# Patient Record
Sex: MALE | Race: ASIAN | NOT HISPANIC OR LATINO | Employment: PART TIME | URBAN - METROPOLITAN AREA
[De-identification: names, ages, dates, MRNs, and addresses within clinical notes are randomized per-mention and may not be internally consistent; named-entity substitution may affect disease eponyms.]

---

## 2022-04-14 ENCOUNTER — LAB (OUTPATIENT)
Dept: LAB | Facility: CLINIC | Age: 42
End: 2022-04-14
Payer: MEDICAID

## 2022-04-14 DIAGNOSIS — Z02.89 REFUGEE HEALTH EXAMINATION: Primary | ICD-10-CM

## 2022-04-14 LAB
ALBUMIN SERPL-MCNC: 3.8 G/DL (ref 3.5–5)
ALP SERPL-CCNC: 78 U/L (ref 45–120)
ALT SERPL W P-5'-P-CCNC: 12 U/L (ref 0–45)
ANION GAP SERPL CALCULATED.3IONS-SCNC: 12 MMOL/L (ref 5–18)
AST SERPL W P-5'-P-CCNC: 15 U/L (ref 0–40)
BASOPHILS # BLD AUTO: 0.1 10E3/UL (ref 0–0.2)
BASOPHILS NFR BLD AUTO: 1 %
BILIRUB SERPL-MCNC: 0.6 MG/DL (ref 0–1)
BUN SERPL-MCNC: 9 MG/DL (ref 8–22)
CALCIUM SERPL-MCNC: 9.4 MG/DL (ref 8.5–10.5)
CHLORIDE BLD-SCNC: 101 MMOL/L (ref 98–107)
CHOLEST SERPL-MCNC: 252 MG/DL
CO2 SERPL-SCNC: 23 MMOL/L (ref 22–31)
CREAT SERPL-MCNC: 1.16 MG/DL (ref 0.7–1.3)
EOSINOPHIL # BLD AUTO: 0.1 10E3/UL (ref 0–0.7)
EOSINOPHIL NFR BLD AUTO: 2 %
ERYTHROCYTE [DISTWIDTH] IN BLOOD BY AUTOMATED COUNT: 12.8 % (ref 10–15)
FASTING STATUS PATIENT QL REPORTED: YES
GFR SERPL CREATININE-BSD FRML MDRD: 80 ML/MIN/1.73M2
GLUCOSE BLD-MCNC: 373 MG/DL (ref 70–125)
HCT VFR BLD AUTO: 51.1 % (ref 40–53)
HDLC SERPL-MCNC: 31 MG/DL
HGB BLD-MCNC: 17.4 G/DL (ref 13.3–17.7)
HIV 1+2 AB+HIV1 P24 AG SERPL QL IA: NEGATIVE
IMM GRANULOCYTES # BLD: 0 10E3/UL
IMM GRANULOCYTES NFR BLD: 0 %
LDLC SERPL CALC-MCNC: ABNORMAL MG/DL
LYMPHOCYTES # BLD AUTO: 2 10E3/UL (ref 0.8–5.3)
LYMPHOCYTES NFR BLD AUTO: 25 %
MCH RBC QN AUTO: 28.8 PG (ref 26.5–33)
MCHC RBC AUTO-ENTMCNC: 34.1 G/DL (ref 31.5–36.5)
MCV RBC AUTO: 85 FL (ref 78–100)
MONOCYTES # BLD AUTO: 0.5 10E3/UL (ref 0–1.3)
MONOCYTES NFR BLD AUTO: 6 %
NEUTROPHILS # BLD AUTO: 5.3 10E3/UL (ref 1.6–8.3)
NEUTROPHILS NFR BLD AUTO: 66 %
NRBC # BLD AUTO: 0 10E3/UL
NRBC BLD AUTO-RTO: 0 /100
PLATELET # BLD AUTO: 255 10E3/UL (ref 150–450)
POTASSIUM BLD-SCNC: 3.9 MMOL/L (ref 3.5–5)
PROT SERPL-MCNC: 8.1 G/DL (ref 6–8)
RBC # BLD AUTO: 6.04 10E6/UL (ref 4.4–5.9)
SODIUM SERPL-SCNC: 136 MMOL/L (ref 136–145)
TRIGL SERPL-MCNC: 542 MG/DL
VZV IGG SER QL IA: 750.1 INDEX
VZV IGG SER QL IA: POSITIVE
WBC # BLD AUTO: 8 10E3/UL (ref 4–11)

## 2022-04-14 PROCEDURE — 99000 SPECIMEN HANDLING OFFICE-LAB: CPT | Performed by: FAMILY MEDICINE

## 2022-04-14 PROCEDURE — 83721 ASSAY OF BLOOD LIPOPROTEIN: CPT | Mod: 59 | Performed by: FAMILY MEDICINE

## 2022-04-14 PROCEDURE — 86787 VARICELLA-ZOSTER ANTIBODY: CPT | Performed by: FAMILY MEDICINE

## 2022-04-14 PROCEDURE — 36415 COLL VENOUS BLD VENIPUNCTURE: CPT | Performed by: FAMILY MEDICINE

## 2022-04-14 PROCEDURE — 86682 HELMINTH ANTIBODY: CPT | Mod: 90 | Performed by: FAMILY MEDICINE

## 2022-04-14 PROCEDURE — 86706 HEP B SURFACE ANTIBODY: CPT | Performed by: FAMILY MEDICINE

## 2022-04-14 PROCEDURE — 86481 TB AG RESPONSE T-CELL SUSP: CPT | Performed by: FAMILY MEDICINE

## 2022-04-14 PROCEDURE — 80061 LIPID PANEL: CPT | Performed by: FAMILY MEDICINE

## 2022-04-14 PROCEDURE — 86704 HEP B CORE ANTIBODY TOTAL: CPT | Performed by: FAMILY MEDICINE

## 2022-04-14 PROCEDURE — 87389 HIV-1 AG W/HIV-1&-2 AB AG IA: CPT | Performed by: FAMILY MEDICINE

## 2022-04-14 PROCEDURE — 80053 COMPREHEN METABOLIC PANEL: CPT | Performed by: FAMILY MEDICINE

## 2022-04-14 PROCEDURE — 86708 HEPATITIS A ANTIBODY: CPT | Performed by: FAMILY MEDICINE

## 2022-04-14 PROCEDURE — 85025 COMPLETE CBC W/AUTO DIFF WBC: CPT | Performed by: FAMILY MEDICINE

## 2022-04-14 PROCEDURE — 86803 HEPATITIS C AB TEST: CPT | Performed by: FAMILY MEDICINE

## 2022-04-14 PROCEDURE — 87340 HEPATITIS B SURFACE AG IA: CPT | Performed by: FAMILY MEDICINE

## 2022-04-15 LAB
GAMMA INTERFERON BACKGROUND BLD IA-ACNC: 0.43 IU/ML
HAV IGG SER QL IA: POSITIVE
HBV CORE AB SERPL QL IA: POSITIVE
HBV SURFACE AB SERPLBLD QL IA.RAPID: POSITIVE
HBV SURFACE AG SERPL QL IA: NONREACTIVE
HCV AB SERPL QL IA: NEGATIVE
LDLC SERPL CALC-MCNC: 122 MG/DL
M TB IFN-G BLD-IMP: NEGATIVE
M TB IFN-G CD4+ BCKGRND COR BLD-ACNC: 9.57 IU/ML
MITOGEN IGNF BCKGRD COR BLD-ACNC: -0.42 IU/ML
MITOGEN IGNF BCKGRD COR BLD-ACNC: 0.25 IU/ML
QUANTIFERON MITOGEN: 10 IU/ML
QUANTIFERON NIL TUBE: 0.43 IU/ML
QUANTIFERON TB1 TUBE: 0.68 IU/ML
QUANTIFERON TB2 TUBE: 0.01

## 2022-04-16 LAB
SCHISTOSOMA IGG SER IA-ACNC: 7 U
STRONGYLOIDES IGG SER IA-ACNC: 0.5 IV

## 2022-04-20 ENCOUNTER — OFFICE VISIT (OUTPATIENT)
Dept: FAMILY MEDICINE | Facility: CLINIC | Age: 42
End: 2022-04-20
Payer: MEDICAID

## 2022-04-20 VITALS
HEART RATE: 96 BPM | WEIGHT: 212.5 LBS | SYSTOLIC BLOOD PRESSURE: 118 MMHG | TEMPERATURE: 98.4 F | DIASTOLIC BLOOD PRESSURE: 68 MMHG | BODY MASS INDEX: 31.47 KG/M2 | HEIGHT: 69 IN | RESPIRATION RATE: 18 BRPM | OXYGEN SATURATION: 96 %

## 2022-04-20 DIAGNOSIS — F43.10 PTSD (POST-TRAUMATIC STRESS DISORDER): ICD-10-CM

## 2022-04-20 DIAGNOSIS — E11.69 TYPE 2 DIABETES MELLITUS WITH OTHER SPECIFIED COMPLICATION, WITHOUT LONG-TERM CURRENT USE OF INSULIN (H): ICD-10-CM

## 2022-04-20 DIAGNOSIS — G47.33 OSA (OBSTRUCTIVE SLEEP APNEA): ICD-10-CM

## 2022-04-20 DIAGNOSIS — Z00.00 PREVENTATIVE HEALTH CARE: Primary | ICD-10-CM

## 2022-04-20 DIAGNOSIS — I51.7 CARDIOMEGALY: ICD-10-CM

## 2022-04-20 DIAGNOSIS — E66.01 CLASS 2 SEVERE OBESITY DUE TO EXCESS CALORIES WITH SERIOUS COMORBIDITY AND BODY MASS INDEX (BMI) OF 35.0 TO 35.9 IN ADULT (H): ICD-10-CM

## 2022-04-20 DIAGNOSIS — L91.8 SKIN TAG: ICD-10-CM

## 2022-04-20 DIAGNOSIS — L91.0 KELOID SCAR: ICD-10-CM

## 2022-04-20 DIAGNOSIS — M16.12 PRIMARY OSTEOARTHRITIS OF LEFT HIP: ICD-10-CM

## 2022-04-20 DIAGNOSIS — R73.09 ELEVATED GLUCOSE: ICD-10-CM

## 2022-04-20 DIAGNOSIS — E66.812 CLASS 2 SEVERE OBESITY DUE TO EXCESS CALORIES WITH SERIOUS COMORBIDITY AND BODY MASS INDEX (BMI) OF 35.0 TO 35.9 IN ADULT (H): ICD-10-CM

## 2022-04-20 DIAGNOSIS — Z02.89 REFUGEE HEALTH EXAMINATION: ICD-10-CM

## 2022-04-20 LAB
ALBUMIN UR-MCNC: NEGATIVE MG/DL
APPEARANCE UR: CLEAR
BILIRUB UR QL STRIP: NEGATIVE
COLOR UR AUTO: YELLOW
CREAT UR-MCNC: 179 MG/DL
GLUCOSE UR STRIP-MCNC: 500 MG/DL
HBA1C MFR BLD: 9.8 % (ref 0–5.6)
HGB UR QL STRIP: NEGATIVE
KETONES UR STRIP-MCNC: NEGATIVE MG/DL
LEUKOCYTE ESTERASE UR QL STRIP: NEGATIVE
MICROALBUMIN UR-MCNC: 1.68 MG/DL (ref 0–1.99)
MICROALBUMIN/CREAT UR: 9.4 MG/G CR
NITRATE UR QL: NEGATIVE
PH UR STRIP: 5.5 [PH] (ref 5–7)
SP GR UR STRIP: 1.02 (ref 1–1.03)
UROBILINOGEN UR STRIP-ACNC: 0.2 E.U./DL

## 2022-04-20 PROCEDURE — 93010 ELECTROCARDIOGRAM REPORT: CPT | Performed by: INTERNAL MEDICINE

## 2022-04-20 PROCEDURE — 93005 ELECTROCARDIOGRAM TRACING: CPT | Performed by: FAMILY MEDICINE

## 2022-04-20 PROCEDURE — 81003 URINALYSIS AUTO W/O SCOPE: CPT | Performed by: FAMILY MEDICINE

## 2022-04-20 PROCEDURE — 82306 VITAMIN D 25 HYDROXY: CPT | Performed by: FAMILY MEDICINE

## 2022-04-20 PROCEDURE — 90713 POLIOVIRUS IPV SC/IM: CPT | Performed by: FAMILY MEDICINE

## 2022-04-20 PROCEDURE — 90715 TDAP VACCINE 7 YRS/> IM: CPT | Performed by: FAMILY MEDICINE

## 2022-04-20 PROCEDURE — 99396 PREV VISIT EST AGE 40-64: CPT | Mod: 25 | Performed by: FAMILY MEDICINE

## 2022-04-20 PROCEDURE — 99214 OFFICE O/P EST MOD 30 MIN: CPT | Mod: 25 | Performed by: FAMILY MEDICINE

## 2022-04-20 PROCEDURE — 90472 IMMUNIZATION ADMIN EACH ADD: CPT | Performed by: FAMILY MEDICINE

## 2022-04-20 PROCEDURE — 90746 HEPB VACCINE 3 DOSE ADULT IM: CPT | Performed by: FAMILY MEDICINE

## 2022-04-20 PROCEDURE — 82043 UR ALBUMIN QUANTITATIVE: CPT | Performed by: FAMILY MEDICINE

## 2022-04-20 PROCEDURE — 90471 IMMUNIZATION ADMIN: CPT | Performed by: FAMILY MEDICINE

## 2022-04-20 PROCEDURE — 36415 COLL VENOUS BLD VENIPUNCTURE: CPT | Performed by: FAMILY MEDICINE

## 2022-04-20 PROCEDURE — 83036 HEMOGLOBIN GLYCOSYLATED A1C: CPT | Performed by: FAMILY MEDICINE

## 2022-04-20 RX ORDER — LISINOPRIL 2.5 MG/1
2.5 TABLET ORAL DAILY
Qty: 30 TABLET | Refills: 11 | Status: SHIPPED | OUTPATIENT
Start: 2022-04-20 | End: 2022-04-21 | Stop reason: ALTCHOICE

## 2022-04-20 RX ORDER — ATORVASTATIN CALCIUM 20 MG/1
20 TABLET, FILM COATED ORAL DAILY
Qty: 30 TABLET | Refills: 11 | Status: SHIPPED | OUTPATIENT
Start: 2022-04-20 | End: 2022-04-21 | Stop reason: ALTCHOICE

## 2022-04-20 RX ORDER — IBUPROFEN 600 MG/1
600 TABLET, FILM COATED ORAL EVERY 6 HOURS PRN
Qty: 50 TABLET | Refills: 4 | Status: SHIPPED | OUTPATIENT
Start: 2022-04-20 | End: 2023-05-19

## 2022-04-20 RX ORDER — ACETAMINOPHEN 500 MG
500-1000 TABLET ORAL EVERY 8 HOURS PRN
Qty: 50 TABLET | Refills: 4 | Status: ON HOLD | OUTPATIENT
Start: 2022-04-20 | End: 2023-06-19

## 2022-04-20 RX ORDER — METFORMIN HCL 500 MG
2000 TABLET, EXTENDED RELEASE 24 HR ORAL
Qty: 120 TABLET | Refills: 11 | Status: SHIPPED | OUTPATIENT
Start: 2022-04-20 | End: 2022-04-21

## 2022-04-21 LAB
ATRIAL RATE - MUSE: 100 BPM
DEPRECATED CALCIDIOL+CALCIFEROL SERPL-MC: 28 UG/L (ref 20–75)
DIASTOLIC BLOOD PRESSURE - MUSE: NORMAL MMHG
INTERPRETATION ECG - MUSE: NORMAL
P AXIS - MUSE: 42 DEGREES
PR INTERVAL - MUSE: 156 MS
QRS DURATION - MUSE: 82 MS
QT - MUSE: 360 MS
QTC - MUSE: 464 MS
R AXIS - MUSE: 16 DEGREES
SYSTOLIC BLOOD PRESSURE - MUSE: NORMAL MMHG
T AXIS - MUSE: 36 DEGREES
VENTRICULAR RATE- MUSE: 100 BPM

## 2022-04-21 RX ORDER — ATORVASTATIN CALCIUM 10 MG/1
10 TABLET, FILM COATED ORAL DAILY
Qty: 30 TABLET | Refills: 11 | Status: SHIPPED | OUTPATIENT
Start: 2022-04-21 | End: 2022-12-12

## 2022-04-21 RX ORDER — LISINOPRIL 2.5 MG/1
2.5 TABLET ORAL DAILY
Qty: 30 TABLET | Refills: 11 | Status: SHIPPED | OUTPATIENT
Start: 2022-04-21 | End: 2024-03-05

## 2022-04-21 RX ORDER — METFORMIN HCL 500 MG
2000 TABLET, EXTENDED RELEASE 24 HR ORAL
Qty: 120 TABLET | Refills: 11 | Status: SHIPPED | OUTPATIENT
Start: 2022-04-21 | End: 2022-08-17
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
## 2022-04-29 ENCOUNTER — TRANSFERRED RECORDS (OUTPATIENT)
Dept: HEALTH INFORMATION MANAGEMENT | Facility: CLINIC | Age: 42
End: 2022-04-29
Payer: MEDICAID

## 2022-05-09 ENCOUNTER — TELEPHONE (OUTPATIENT)
Dept: FAMILY MEDICINE | Facility: CLINIC | Age: 42
End: 2022-05-09
Payer: MEDICAID

## 2022-05-09 NOTE — TELEPHONE ENCOUNTER
Reason for Call:  Other Transportation     Detailed comments: Patient's  called to request transportation for patient's upcoming appts. Please help assist in scheduling transportation for upcoming appts.     Phone Number Patient can be reached at: Home number on file [phone] (home)    Best Time: any    Can we leave a detailed message on this number? YES    Call taken on 5/9/2022 at 11:13 AM by Elfego Farfan

## 2022-05-20 ENCOUNTER — TRANSFERRED RECORDS (OUTPATIENT)
Dept: HEALTH INFORMATION MANAGEMENT | Facility: CLINIC | Age: 42
End: 2022-05-20

## 2022-06-07 ENCOUNTER — TRANSFERRED RECORDS (OUTPATIENT)
Dept: HEALTH INFORMATION MANAGEMENT | Facility: CLINIC | Age: 42
End: 2022-06-07

## 2022-06-10 ENCOUNTER — TELEPHONE (OUTPATIENT)
Dept: ORTHOPEDICS | Facility: CLINIC | Age: 42
End: 2022-06-10
Payer: MEDICAID

## 2022-06-10 NOTE — TELEPHONE ENCOUNTER
Writer talked with the call center. Patient was on phone trying to get scheduled with Dr. Salmon for Left hip pain. Writer is unable to see any images at this time. Pt states that MRI was done at [facility]. The imaging coordinator in clinic will work on requesting imaging to be pushed. Once imaging is arrived writer will have Dr. Salmon's team review and see if pt can be added to providers schedule. Pt is being referred from [facility].     Jory Gonzalez LPN

## 2022-06-10 NOTE — TELEPHONE ENCOUNTER
Health Call Center    Phone Message    May a detailed message be left on voicemail: yes     Reason for Call: Appointment Intake    Referring Provider Name: Dr. Salmon  Diagnosis and/or Symptoms:  Left hip pain.  Pt being referred from [facility] - ( Dr. Ric Carrizales) referring provider     Pt had 3 MRI's done. Last MRI done at [facility].  Pt also had MRI's done at [facility]    Action Taken: Message routed to:  [facility] Orthopedics - Dr. Salmon    Travel Screening: Not Applicable

## 2022-06-14 ENCOUNTER — TELEPHONE (OUTPATIENT)
Dept: FAMILY MEDICINE | Facility: CLINIC | Age: 42
End: 2022-06-14

## 2022-06-14 NOTE — TELEPHONE ENCOUNTER
Patient missed his appointment today.  Because he is a new refugee and he has diabetes, we know he really needs to be seen regularly and that he might have trouble remembering appointments.    Is he planning to follow up? At [facility] or another clinic? What time of day is best for appointments?    His wife, too, needs follow up.    Please help him to schedule (and his wife) if he agrees with anyone at any clinic he chooses.

## 2022-06-17 ENCOUNTER — TELEPHONE (OUTPATIENT)
Dept: FAMILY MEDICINE | Facility: CLINIC | Age: 42
End: 2022-06-17
Payer: COMMERCIAL

## 2022-06-17 NOTE — TELEPHONE ENCOUNTER
PCP received a medical opinion form which is said to be due 6/22/22. However, patient's transportation did not show up, so he missed his recent appointment. He is rescehduled for 6/29/22. PCP cannot complete the form until after that appointment.

## 2022-06-29 ENCOUNTER — MEDICAL CORRESPONDENCE (OUTPATIENT)
Dept: HEALTH INFORMATION MANAGEMENT | Facility: CLINIC | Age: 42
End: 2022-06-29

## 2022-06-29 ENCOUNTER — OFFICE VISIT (OUTPATIENT)
Dept: FAMILY MEDICINE | Facility: CLINIC | Age: 42
End: 2022-06-29
Payer: COMMERCIAL

## 2022-06-29 VITALS
SYSTOLIC BLOOD PRESSURE: 112 MMHG | TEMPERATURE: 98 F | OXYGEN SATURATION: 96 % | WEIGHT: 209.5 LBS | DIASTOLIC BLOOD PRESSURE: 70 MMHG | BODY MASS INDEX: 31.03 KG/M2 | HEART RATE: 105 BPM | RESPIRATION RATE: 16 BRPM

## 2022-06-29 DIAGNOSIS — Z00.00 PREVENTATIVE HEALTH CARE: ICD-10-CM

## 2022-06-29 DIAGNOSIS — E11.69 TYPE 2 DIABETES MELLITUS WITH OTHER SPECIFIED COMPLICATION, WITHOUT LONG-TERM CURRENT USE OF INSULIN (H): ICD-10-CM

## 2022-06-29 DIAGNOSIS — M16.12 PRIMARY OSTEOARTHRITIS OF LEFT HIP: Primary | ICD-10-CM

## 2022-06-29 PROCEDURE — 99215 OFFICE O/P EST HI 40 MIN: CPT | Performed by: FAMILY MEDICINE

## 2022-06-29 PROCEDURE — 99417 PROLNG OP E/M EACH 15 MIN: CPT | Performed by: FAMILY MEDICINE

## 2022-06-29 NOTE — PROGRESS NOTES
Assessment & Plan     Primary osteoarthritis of left hip  I am unsure the problem is osteoarthritis - that can be determined by the orthopod. He likely had a hip infection or some major illness that centered in the left hip when he was 7. Then he injured the hip again at age 14. He was very disappointed the doctor at [facility] would not help him. He is very willing to go to the [facility] to see what they say. He is motivated to get improvement in his left and he'd like to do the surgery soon, and then start work.  I will complete his workability form, too.    Preventative health care  reviewed  - REVIEW OF HEALTH MAINTENANCE PROTOCOL ORDERS    Type 2 diabetes mellitus with other specified complication, without long-term current use of insulin (H)  He has had trouble tolerating the metformin. I asked him to go slowly in order to try to help his body get used to the metformin. If he cannot get beyond 500mg, then I'll add glipizide for better control. This really needs to be better controlled before he can work. I also spent some time discussing lifestyle means to control his sugars - having a variety of foods at meals, exercise, etc.He and his wife asked questions and were very attentive.  - OPTOMETRY REFERRAL      Review of external notes as documented elsewhere in note  Ordering of each unique test  Prescription drug management  75 minutes spent on the date of the encounter doing chart review, history and exam, documentation and further activities per the note    Return in about 5 weeks (around 8/3/2022) for Follow up.    Hanny Vela MD  [facility] SHANIQUE Shepherd is a 42 year old accompanied by his spouse., presenting for the following health issues:  Diabetes      History of Present Illness       Diabetes:   He presents for follow up of diabetes.  He is not checking blood glucose. He has no concerns regarding his diabetes at this time.  He is not experiencing numbness  or burning in feet, excessive thirst, blurry vision, weight changes or redness, sores or blisters on feet. The patient has not had a diabetic eye exam in the last 12 months.         He eats 2-3 servings of fruits and vegetables daily.He consumes 0 sweetened beverage(s) daily.He exercises with enough effort to increase his heart rate 9 or less minutes per day.  He exercises with enough effort to increase his heart rate 3 or less days per week.   He is taking medications regularly.     Needs form completed, saying if he can work or not.  Frustration with phone communication and need for transportation. 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 He has passed the written exam.    Need for ortho appt. He has tried to call to get an appointment, but something is not working because he has not yet been able to setup an appointment. He'd like help to set it up. When he was at [facility] he was mad that the doctor there said he could not do anything to help. Thet has waited his whole life to be in a place where he could get his hip repaired. He is extremely motivated to do whatever is needed so his hip can improve. He dreams of being able to run again.    He has terrible diarrhea from the metformin. He took it as instructed, but he's had to limit how much he goes out of the house because of needing to use the toilet so often. He is eager to learn more about lifestyle measures to control sugars. He hopes to avoid insulin.    Review of Systems   Lots of frustrations with being in dependent positions.      Objective    /70   Pulse 105   Temp 98  F (36.7  C) (Temporal)   Resp 16   Wt 95 kg (209 lb 8 oz)   SpO2 96%   BMI 31.03 kg/m    Body mass index is 31.03 kg/m .  Physical Exam   GENERAL: healthy, alert and no distress  NECK: no adenopathy, no asymmetry, masses, or scars and thyroid normal to palpation  RESP: lungs clear to auscultation - no rales, rhonchi or wheezes  CV: regular rate and  rhythm, normal S1 S2, no S3 or S4, no murmur, click or rub, no peripheral edema and peripheral pulses strong  PSYCH: mentation appears normal, affect normal/bright  Gait: uneven but steady    Reviewed past labs- it is too early to recheck A1-C            .  ..

## 2022-06-30 NOTE — TELEPHONE ENCOUNTER
DIAGNOSIS: Primary osteoarthritis of left hip/ ELOISE VELA   APPOINTMENT DATE: 7.8.22   NOTES STATUS DETAILS   OFFICE NOTE from referring provider Internal 06/29/2022, 04/20/2022 -  Dr Eloise Vela, [facility]     OFFICE NOTE from other specialist [facility] 05/20/2022, 04/29/2022 - Colette Smith MD - [facility]    04/29/2022 - Ric Carrizales PA-C - [facility]   MEDICATION LIST Internal/Care Everywhere    LABS     CBC/DIFF Internal 04/14/2022   INJECTIONS PACS 05/20/2022   MRI PACS 06/07/2022, 05/03/2022 - LT Hip  05/20/2022 - LT Hip Arthrogram   XRAYS (IMAGES & REPORTS) PACS 04/29/2022 - Bilateral Hip     Action 6.30.22 4:40 PM FRANKY   Action Taken Faxed request to [facility] [phone]      Action July 6, 2022 4:38 PM MT   Action Taken [facility] sent a req for imgs from [facility] [phone] .     Action July 7, 2022 7:58 PM MT   Action Taken [facility] records recvd and sent to scan by another user.   [facility] recvd and resolved imgs to PACS.

## 2022-07-08 ENCOUNTER — OFFICE VISIT (OUTPATIENT)
Dept: ORTHOPEDICS | Facility: CLINIC | Age: 42
End: 2022-07-08
Payer: COMMERCIAL

## 2022-07-08 ENCOUNTER — PRE VISIT (OUTPATIENT)
Dept: ORTHOPEDICS | Facility: CLINIC | Age: 42
End: 2022-07-08

## 2022-07-08 VITALS — BODY MASS INDEX: 30.51 KG/M2 | HEIGHT: 69 IN | WEIGHT: 206 LBS

## 2022-07-08 DIAGNOSIS — Z98.890 S/P GIRDLESTONE PROCEDURE: Primary | ICD-10-CM

## 2022-07-08 PROCEDURE — 99203 OFFICE O/P NEW LOW 30 MIN: CPT | Mod: GC | Performed by: ORTHOPAEDIC SURGERY
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
## 2022-07-15 ENCOUNTER — VIRTUAL VISIT (OUTPATIENT)
Dept: SLEEP MEDICINE | Facility: CLINIC | Age: 42
End: 2022-07-15
Attending: FAMILY MEDICINE
Payer: COMMERCIAL

## 2022-07-15 VITALS — HEIGHT: 70 IN | BODY MASS INDEX: 30.06 KG/M2 | WEIGHT: 210 LBS

## 2022-07-15 DIAGNOSIS — G47.30 OBSERVED SLEEP APNEA: ICD-10-CM

## 2022-07-15 DIAGNOSIS — R06.83 SNORING: Primary | ICD-10-CM

## 2022-07-15 DIAGNOSIS — F51.04 CHRONIC INSOMNIA: ICD-10-CM

## 2022-07-15 PROCEDURE — 99205 OFFICE O/P NEW HI 60 MIN: CPT | Mod: 95 | Performed by: PHYSICIAN ASSISTANT

## 2022-07-15 ASSESSMENT — SLEEP AND FATIGUE QUESTIONNAIRES
HOW LIKELY ARE YOU TO NOD OFF OR FALL ASLEEP WHILE SITTING INACTIVE IN A PUBLIC PLACE: SLIGHT CHANCE OF DOZING
HOW LIKELY ARE YOU TO NOD OFF OR FALL ASLEEP WHILE SITTING AND TALKING TO SOMEONE: MODERATE CHANCE OF DOZING
HOW LIKELY ARE YOU TO NOD OFF OR FALL ASLEEP WHILE SITTING AND READING: MODERATE CHANCE OF DOZING
HOW LIKELY ARE YOU TO NOD OFF OR FALL ASLEEP WHEN YOU ARE A PASSENGER IN A CAR FOR AN HOUR WITHOUT A BREAK: MODERATE CHANCE OF DOZING
HOW LIKELY ARE YOU TO NOD OFF OR FALL ASLEEP WHILE LYING DOWN TO REST IN THE AFTERNOON WHEN CIRCUMSTANCES PERMIT: MODERATE CHANCE OF DOZING
HOW LIKELY ARE YOU TO NOD OFF OR FALL ASLEEP IN A CAR, WHILE STOPPED FOR A FEW MINUTES IN TRAFFIC: WOULD NEVER DOZE
HOW LIKELY ARE YOU TO NOD OFF OR FALL ASLEEP WHILE WATCHING TV: MODERATE CHANCE OF DOZING
HOW LIKELY ARE YOU TO NOD OFF OR FALL ASLEEP WHILE SITTING QUIETLY AFTER LUNCH WITHOUT ALCOHOL: MODERATE CHANCE OF DOZING

## 2022-07-15 ASSESSMENT — PAIN SCALES - GENERAL: PAINLEVEL: NO PAIN (0)

## 2022-07-15 NOTE — PATIENT INSTRUCTIONS
"      MY TREATMENT INFORMATION FOR SLEEP APNEA-  Cora Senior    DOCTOR : Bennett Goltz, PA-KARLENE    Am I having a sleep study at a sleep center?  --->Due to normal delays, you will be contacted within 2-4 weeks to schedule    Am I having a home sleep study?  --->Watch the video for the device you are using:    -/drop off device-   https://www.[employer].com/watch?v=yGGFBdELGhk    -Disposable device sent out require phone/computer application-   https://www.[employer].com/watch?v=BCce_vbiwxE      Frequently asked questions:  1. What is Obstructive Sleep Apnea (SULTANA)? SULTANA is the most common type of sleep apnea. Apnea means, \"without breath.\"  Apnea is most often caused by narrowing or collapse of the upper airway as muscles relax during sleep.   Almost everyone has occasional apneas. Most people with sleep apnea have had brief interruptions at night frequently for many years.  The severity of sleep apnea is related to how frequent and severe the events are.   2. What are the consequences of SULTANA? Symptoms include: feeling sleepy during the day, snoring loudly, gasping or stopping of breathing, trouble sleeping, and occasionally morning headaches or heartburn at night.  Sleepiness can be serious and even increase the risk of falling asleep while driving. Other health consequences may include development of high blood pressure and other cardiovascular disease in persons who are susceptible. Untreated SULTANA  can contribute to heart disease, stroke and diabetes.   3. What are the treatment options? In most situations, sleep apnea is a lifelong disease that must be managed with daily therapy. Medications are not effective for sleep apnea and surgery is generally not considered until other therapies have been tried. Your treatment is your choice . Continuous Positive Airway (CPAP) works right away and is the therapy that is effective in nearly everyone. An oral device to hold your jaw forward is usually the next most reliable " option. Other options include postioning devices (to keep you off your back), weight loss, and surgery including a tongue pacing device. There is more detail about some of these options below.  4. Are my sleep studies covered by insurance? Although we will request verification of coverage, we advise you also check in advance of the study to ensure there is coverage.    Important tips for those choosing CPAP and similar devices   Know your equipment:  CPAP is continuous positive airway pressure that prevents obstructive sleep apnea by keeping the throat from collapsing while you are sleeping. In most cases, the device is  smart  and can slowly self-adjusts if your throat collapses and keeps a record every day of how well you are treated-this information is available to you and your care team.  BPAP is bilevel positive airway pressure that keeps your throat open and also assists each breath with a pressure boost to maintain adequate breathing.  Special kinds of BPAP are used in patients who have inadequate breathing from lung or heart disease. In most cases, the device is  smart  and can slowly self-adjusts to assist breathing. Like CPAP, the device keeps a record of how well you are treated.  Your mask is your connection to the device. You get to choose what feels most comfortable and the staff will help to make sure if fits. Here: are some examples of the different masks that are available:       Key points to remember on your journey with sleep apnea:  Sleep study.  PAP devices often need to be adjusted during a sleep study to show that they are effective and adjusted right.  Good tips to remember: Try wearing just the mask during a quiet time during the day so your body adapts to wearing it. A humidifier is recommended for comfort in most cases to prevent drying of your nose and throat. Allergy medication from your provider may help you if you are having nasal congestion.  Getting settled-in. It takes more than  one night for most of us to get used to wearing a mask. Try wearing just the mask during a quiet time during the day so your body adapts to wearing it. A humidifier is recommended for comfort in most cases. Our team will work with you carefully on the first day and will be in contact within 4 days and again at 2 and 4 weeks for advice and remote device adjustments. Your therapy is evaluated by the device each day.   Use it every night. The more you are able to sleep naturally for 7-8 hours, the more likely you will have good sleep and to prevent health risks or symptoms from sleep apnea. Even if you use it 4 hours it helps. Occasionally all of us are unable to use a medical therapy, in sleep apnea, it is not dangerous to miss one night.   Communicate. Call our skilled team on the number provided on the first day if your visit for problems that make it difficult to wear the device. Over 2 out of 3 patients can learn to wear the device long-term with help from our team. Remember to call our team or your sleep providers if you are unable to wear the device as we may have other solutions for those who cannot adapt to mask CPAP therapy. It is recommended that you sleep your sleep provider within the first 3 months and yearly after that if you are not having problems.   Use it for your health. We encourage use of CPAP masks during daytime quiet periods to allow your face and brain to adapt to the sensation of CPAP so that it will be a more natural sensation to awaken to at night or during naps. This can be very useful during the first few weeks or months of adapting to CPAP though it does not help medically to wear CPAP during wakefulness and  should not be used as a strategy just to meet guidelines.  Take care of your equipment. Make sure you clean your mask and tubing using directions every day and that your filter and mask are replaced as recommended or if they are not working.     BESIDES CPAP, WHAT OTHER THERAPIES  ARE THERE?    Positioning Device  Positioning devices are generally used when sleep apnea is mild and only occurs on your back.This example shows a pillow that straps around the waist. It may be appropriate for those whose sleep study shows milder sleep apnea that occurs primarily when lying flat on one's back. Preliminary studies have shown benefit but effectiveness at home may need to be verified by a home sleep test. These devices are generally not covered by medical insurance.  Examples of devices that maintain sleeping on the back to prevent snoring and mild sleep apnea.    Belt type body positioner  http://[employer]/    Electronic reminder  http://nightshifttherapy.com/  http://www.[employer].au/      Oral Appliance  What is oral appliance therapy?  An oral appliance device fits on your teeth at night like a retainer used after having braces. The device is made by a specialized dentist and requires several visits over 1-2 months before a manufactured device is made to fit your teeth and is adjusted to prevent your sleep apnea. Once an oral device is working properly, snoring should be improved. A home sleep test may be recommended at that time if to determine whether the sleep apnea is adequately treated.       Some things to remember:  -Oral devices are often, but not always, covered by your medical insurance. Be sure to check with your insurance provider.   -If you are referred for oral therapy, you will be given a list of specialized dentists to consider or you may choose to visit the Web site of the American Academy of Dental Sleep Medicine  -Oral devices are less likely to work if you have severe sleep apnea or are extremely overweight.     More detailed information  An oral appliance is a small acrylic device that fits over the upper and lower teeth  (similar to a retainer or a mouth guard). This device slightly moves jaw forward, which moves the base of the tongue forward, opens the airway, improves  breathing for effective treat snoring and obstructive sleep apnea in perhaps 7 out of 10 people .  The best working devices are custom-made by a dental device  after a mold is made of the teeth 1, 2, 3.  When is an oral appliance indicated?  Oral appliance therapy is recommended as a first-line treatment for patients with primary snoring, mild sleep apnea, and for patients with moderate sleep apnea who prefer appliance therapy to use of CPAP4, 5. Severity of sleep apnea is determined by sleep testing and is based on the number of respiratory events per hour of sleep.   How successful is oral appliance therapy?  The success rate of oral appliance therapy in patients with mild sleep apnea is 75-80% while in patients with moderate sleep apnea it is 50-70%. The chance of success in patients with severe sleep apnea is 40-50%. The research also shows that oral appliances have a beneficial effect on the cardiovascular health of SULTANA patients at the same magnitude as CPAP therapy7.  Oral appliances should be a second-line treatment in cases of severe sleep apnea, but if not completely successful then a combination therapy utilizing CPAP plus oral appliance therapy may be effective. Oral appliances tend to be effective in a broad range of patients although studies show that the patients who have the highest success are females, younger patients, those with milder disease, and less severe obesity. 3, 6.   Finding a dentist that practices dental sleep medicine  Specific training is available through the American Academy of Dental Sleep Medicine for dentists interested in working in the field of sleep. To find a dentist who is educated in the field of sleep and the use of oral appliances, near you, visit the Web site of the American Academy of Dental Sleep Medicine.    References  1. Betsy et al. Objectively measured vs self-reported compliance during oral appliance therapy for sleep-disordered breathing. Chest  2013; 144(5): 7308-5625.  2. Shelli, et al. Objective measurement of compliance during oral appliance therapy for sleep-disordered breathing. Thorax 2013; 68(1): 91-96.  3. Katie et al. Mandibular advancement devices in 620 men and women with SULTANA and snoring: tolerability and predictors of treatment success. Chest 2004; 125: 4678-2333.  4. Maggie et al. Oral appliances for snoring and SULTANA: a review. Sleep 2006; 29: 244-262.  5. Juan Carlos et al. Oral appliance treatment for SULTANA: an update. J Clin Sleep Med 2014; 10(2): 215-227.  6. Jeramy et al. Predictors of OSAH treatment outcome. J Dent Res 2007; 86: 4057-4372.      Weight Loss:    Weight loss is a long-term strategy that may improve sleep apnea in some patients.    Weight management is a personal decision and the decision should be based on your interest and the potential benefits.  If you are interested in exploring weight loss strategies, the following discussion covers the impact on weight loss on sleep apnea and the approaches that may be successful.    Being overweight does not necessarily mean you will have health consequences.  Those who have BMI over 35 or over 27 with existing medical conditions carries greater risk.   Weight loss decreases severity of sleep apnea in most people with obesity. For those with mild obesity who have developed snoring with weight gain, even 15-30 pound weight loss can improve and occasionally eliminate sleep apnea.  Structured and life-long dietary and health habits are necessary to lose weight and keep healthier weight levels.     Though there may be significant health benefits from weight loss, long-term weight loss is very difficult to achieve- studies show success with dietary management in less than 10% of people. In addition, substantial weight loss may require years of dietary control and may be difficult if patients have severe obesity. In these cases, surgical management may be considered.  Finally,  older individuals who have tolerated obesity without health complications may be less likely to benefit from weight loss strategies.      BMI 30    Surgery:    Surgery for obstructive sleep apnea is considered generally only when other therapies fail to work. Surgery may be discussed with you if you are having a difficult time tolerating CPAP and or when there is an abnormal structure that requires surgical correction.  Nose and throat surgeries often enlarge the airway to prevent collapse.  Most of these surgeries create pain for 1-2 weeks and up to half of the most common surgeries are not effective throughout life.  You should carefully discuss the benefits and drawbacks to surgery with your sleep provider and surgeon to determine if it is the best solution for you.   More information  Surgery for SULTANA is directed at areas that are responsible for narrowing or complete obstruction of the airway during sleep.  There are a wide range of procedures available to enlarge and/or stabilize the airway to prevent blockage of breathing in the three major areas where it can occur: the palate, tongue, and nasal regions.  Successful surgical treatment depends on the accurate identification of the factors responsible for obstructive sleep apnea in each person.  A personalized approach is required because there is no single treatment that works well for everyone.  Because of anatomic variation, consultation with an examination by a sleep surgeon is a critical first step in determining what surgical options are best for each patient.  In some cases, examination during sedation may be recommended in order to guide the selection of procedures.  Patients will be counseled about risks and benefits as well as the typical recovery course after surgery. Surgery is typically not a cure for a person s SULTANA.  However, surgery will often significantly improve one s SULTANA severity (termed  success rate ).  Even in the absence of a cure, surgery  will decrease the cardiovascular risk associated with OSA7; improve overall quality of life8 (sleepiness, functionality, sleep quality, etc).      Palate Procedures:  Patients with SULTANA often have narrowing of their airway in the region of their tonsils and uvula.  The goals of palate procedures are to widen the airway in this region as well as to help the tissues resist collapse.  Modern palate procedure techniques focus on tissue conservation and soft tissue rearrangement, rather than tissue removal.  Often the uvula is preserved in this procedure. Residual sleep apnea is common in patient after pharyngoplasty with an average reduction in sleep apnea events of 33%2.      Tongue Procedures:  ExamWhile patients are awake, the muscles that surround the throat are active and keep this region open for breathing. These muscles relax during sleep, allowing the tongue and other structures to collapse and block breathing.  There are several different tongue procedures available.  Selection of a tongue base procedure depends on characteristics seen on physical exam.  Generally, procedures are aimed at removing bulky tissues in this area or preventing the back of the tongue from falling back during sleep.  Success rates for tongue surgery range from 50-62%3.    Hypoglossal Nerve Stimulation:  Hypoglossal nerve stimulation has recently received approval from the [place] Food and Drug Administration for the treatment of obstructive sleep apnea.  This is based on research showing that the system was safe and effective in treating sleep apnea6.  Results showed that the median AHI score decreased 68%, from 29.3 to 9.0. This therapy uses an implant system that senses breathing patterns and delivers mild stimulation to airway muscles, which keeps the airway open during sleep.  The system consists of three fully implanted components: a small generator (similar in size to a pacemaker), a breathing sensor, and a stimulation lead.   Using a small handheld remote, a patient turns the therapy on before bed and off upon awakening.    Candidates for this device must be greater than 22 years of age, have moderate to severe SULTANA (AHI between 20-65), BMI less than 32, have tried CPAP/oral appliance without success, and have appropriate upper airway anatomy (determined by a sleep endoscopy performed by Dr. Parsons).    Hypoglossal Nerve Stimulation Pathway:    The sleep surgeon s office will work with the patient through the insurance prior-authorization process (including communications and appeals).    Nasal Procedures:  Nasal obstruction can interfere with nasal breathing during the day and night.  Studies have shown that relief of nasal obstruction can improve the ability of some patients to tolerate positive airway pressure therapy for obstructive sleep apnea1.  Treatment options include medications such as nasal saline, topical corticosteroid and antihistamine sprays, and oral medications such as antihistamines or decongestants. Non-surgical treatments can include external nasal dilators for selected patients. If these are not successful by themselves, surgery can improve the nasal airway either alone or in combination with these other options.    Combination Procedures:  Combination of surgical procedures and other treatments may be recommended, particularly if patients have more than one area of narrowing or persistent positional disease.  The success rate of combination surgery ranges from 66-80%2,3.    References  Samir SOLER. The Role of the Nose in Snoring and Obstructive Sleep Apnoea: An Update.  Eur Arch Otorhinolaryngol. 2011; 268: 1365-73.   Ailin SM; Tamar JA; Jeff JR; Pallanch JF; Nydia MB; Gildardo SG; Ying GALVAN. Surgical modifications of the upper airway for obstructive sleep apnea in adults: a systematic review and meta-analysis. SLEEP 2010;33(10):1644-6843. Siomara ROBLES. Hypopharyngeal surgery in obstructive sleep apnea: an  evidence-based medicine review.  Arch Otolaryngol Head Neck Surg. 2006 Feb;132(2):206-13.  Donell YH1, Nikolas Y, Daren FRANKY. The efficacy of anatomically based multilevel surgery for obstructive sleep apnea. Otolaryngol Head Neck Surg. 2003 Oct;129(4):327-35.  Siomara ROBLES, Goldberg A. Hypopharyngeal Surgery in Obstructive Sleep Apnea: An Evidence-Based Medicine Review. Arch Otolaryngol Head Neck Surg. 2006 Feb;132(2):206-13.  Gray VORA et al. Upper-Airway Stimulation for Obstructive Sleep Apnea.  N Engl J Med. 2014 Jan 9;370(2):139-49.  Matthieu Y et al. Increased Incidence of Cardiovascular Disease in Middle-aged Men with Obstructive Sleep Apnea. Am J Respir Crit Care Med; 2002 166: 159-165  Aliceagunjan ANNA et al. Studying Life Effects and Effectiveness of Palatopharyngoplasty (SLEEP) study: Subjective Outcomes of Isolated Uvulopalatopharyngoplasty. Otolaryngol Head Neck Surg. 2011; 144: 623-631.      Remember to Drive Safe... Drive Alive     Sleep health profoundly affects your health, mood, and your safety.  Thirty three percent of the population (one in three of us) is not getting enough sleep and many have a sleep disorder. Not getting enough sleep or having an untreated / undertreated sleep condition may make us sleepy without even knowing it. In fact, our driving could be dramatically impaired due to our sleep health. As your provider, here are some things I would like you to know about driving:     Here are some warning signs for impairment and dangerous drowsy driving:              -Having been awake more than 16 hours               -Looking tired               -Eyelid drooping              -Head nodding (it could be too late at this point)              -Driving for more than 30 minutes     Some things you could do to make the driving safer if you are experiencing some drowsiness:              -Stop driving and rest              -Call for transportation              -Make sure your sleep disorder is adequately treated      Some things that have been shown NOT to work when experiencing drowsiness while driving:              -Turning on the radio              -Opening windows              -Eating any  distracting  /  entertaining  foods (e.g., sunflower seeds, candy, or any other)              -Talking on the phone      Your decision may not only impact your life, but also the life of others. Please, remember to drive safe for yourself and all of us.

## 2022-07-15 NOTE — PROGRESS NOTES
Thet is a 42 year old who is being evaluated via a billable video visit.      How would you like to obtain your AVS? Mail a copy  If the video visit is dropped, the invitation should be resent by: Send to e-mail at: No e-mail address on record  Will anyone else be joining your video visit? Reyna Mercedes    Video-Visit Details    Video Start Time: 1:12 PM    Type of service:  Video Visit    Video End Time:2:07 PM    Originating Location (pt. Location): Home    Distant Location (provider location):  [facility] SLEEP [facility]     Platform used for Video Visit: [employer]

## 2022-07-19 ENCOUNTER — TELEPHONE (OUTPATIENT)
Dept: ORTHOPEDICS | Facility: CLINIC | Age: 42
End: 2022-07-19

## 2022-07-19 NOTE — TELEPHONE ENCOUNTER
ATC called pt and scheduled pt with Dr. Fraire regarding his left hip. Confirmed location with pt. Also mailed apt reminder to pt.           -ELLEN Khalil- Orthopedics

## 2022-07-20 NOTE — TELEPHONE ENCOUNTER
DIAGNOSIS: left hip pain    APPOINTMENT DATE: 07/26/2022   NOTES STATUS DETAILS   OFFICE NOTE from referring provider Internal 07/08/2022 Dr Salmon [facility]    OFFICE NOTE from other specialist Received 06/29/2022, 04/20/2022 -  Dr Hanny Vela, [facility]  05/20/2022, 04/29/2022 - Colette Smith MD - [facility]   04/29/2022 - Ric Carrizales PA-C - [facility]   DISCHARGE SUMMARY from hospital N/A    DISCHARGE REPORT from the ER N/A    OPERATIVE REPORT N/A    MEDICATION LIST N/A    EMG (for Spine) N/A    IMPLANT RECORD/STICKER N/A    LABS     CBC/DIFF Internal 04/14/2022   CULTURES N/A    INJECTIONS DONE IN RADIOLOGY Internal 05/20/2022   MRI Received 06/07/2022, 05/03/2022 - LT Hip  05/20/2022 - LT Hip Arthrogram   CT SCAN N/A    XRAYS (IMAGES & REPORTS) Received 04/29/2022 - Bilateral Hip   TUMOR     PATHOLOGY  Slides & report N/A

## 2022-07-24 ENCOUNTER — HEALTH MAINTENANCE LETTER (OUTPATIENT)
Age: 42
End: 2022-07-24

## 2022-07-26 ENCOUNTER — PRE VISIT (OUTPATIENT)
Dept: ORTHOPEDICS | Facility: CLINIC | Age: 42
End: 2022-07-26

## 2022-07-26 ENCOUNTER — OFFICE VISIT (OUTPATIENT)
Dept: ORTHOPEDICS | Facility: CLINIC | Age: 42
End: 2022-07-26
Payer: COMMERCIAL

## 2022-07-26 VITALS — HEIGHT: 70 IN | BODY MASS INDEX: 29.49 KG/M2 | WEIGHT: 206 LBS

## 2022-07-26 DIAGNOSIS — Z98.890 S/P GIRDLESTONE PROCEDURE: Primary | ICD-10-CM

## 2022-07-26 PROCEDURE — 99202 OFFICE O/P NEW SF 15 MIN: CPT | Performed by: ORTHOPAEDIC SURGERY

## 2022-07-26 ASSESSMENT — PAIN SCALES - GENERAL: PAINLEVEL: WORST PAIN (10)

## 2022-07-26 NOTE — NURSING NOTE
"Cora SIMIsimi Parrish's chief complaint for this visit includes:  Chief Complaint   Patient presents with     Consult     Left hip was \"cut out\" - there is no joint     PCP: Hanny Vela    Referring Provider:  No referring provider defined for this encounter.    Ht 1.778 m (5' 10\")   Wt 93.4 kg (206 lb)   BMI 29.56 kg/m    Worst Pain (10)     Pain increases with: Walking 100-200 yards  Previous surgeries: 20 years ago- [place] - 4 different surgeries   1. Biopsy  2. Fill bone with cement   3. Total hip replacement  4. Take out total hip.   Imaging completed: MRI 6/7/22; Arthrogram 5/20/22 XR 4/29/22      Luz Carmona, ATC  "
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
## 2022-08-01 ENCOUNTER — TRANSFERRED RECORDS (OUTPATIENT)
Dept: MULTI SPECIALTY CLINIC | Facility: CLINIC | Age: 42
End: 2022-08-01

## 2022-08-01 LAB — RETINOPATHY: NORMAL

## 2022-08-08 ENCOUNTER — MYC MEDICAL ADVICE (OUTPATIENT)
Dept: FAMILY MEDICINE | Facility: CLINIC | Age: 42
End: 2022-08-08

## 2022-08-09 NOTE — TELEPHONE ENCOUNTER
Per  she did filled out the form but can not find it anywhere, form is not scan in the pt's chart. Called [facility] and requested another form per . Thanks.

## 2022-08-17 ENCOUNTER — MYC MEDICAL ADVICE (OUTPATIENT)
Dept: FAMILY MEDICINE | Facility: CLINIC | Age: 42
End: 2022-08-17

## 2022-08-17 ENCOUNTER — OFFICE VISIT (OUTPATIENT)
Dept: FAMILY MEDICINE | Facility: CLINIC | Age: 42
End: 2022-08-17
Payer: COMMERCIAL

## 2022-08-17 VITALS
OXYGEN SATURATION: 96 % | WEIGHT: 207 LBS | TEMPERATURE: 98 F | RESPIRATION RATE: 16 BRPM | HEART RATE: 105 BPM | HEIGHT: 70 IN | BODY MASS INDEX: 29.63 KG/M2 | DIASTOLIC BLOOD PRESSURE: 62 MMHG | SYSTOLIC BLOOD PRESSURE: 96 MMHG

## 2022-08-17 DIAGNOSIS — M16.12 PRIMARY OSTEOARTHRITIS OF LEFT HIP: ICD-10-CM

## 2022-08-17 DIAGNOSIS — Z23 NEED FOR PNEUMOCOCCAL VACCINATION: ICD-10-CM

## 2022-08-17 DIAGNOSIS — Z92.29 HAS RECEIVED THIRD DOSE OF HEPATITIS B VACCINE: ICD-10-CM

## 2022-08-17 DIAGNOSIS — M1A.0720 CHRONIC GOUT OF LEFT ANKLE, UNSPECIFIED CAUSE: ICD-10-CM

## 2022-08-17 DIAGNOSIS — M1A.9XX1 CHRONIC GOUT INVOLVING TOE WITH TOPHUS, UNSPECIFIED CAUSE, UNSPECIFIED LATERALITY: ICD-10-CM

## 2022-08-17 DIAGNOSIS — E11.69 TYPE 2 DIABETES MELLITUS WITH OTHER SPECIFIED COMPLICATION, WITHOUT LONG-TERM CURRENT USE OF INSULIN (H): Primary | ICD-10-CM

## 2022-08-17 DIAGNOSIS — E78.5 HYPERLIPIDEMIA LDL GOAL <100: ICD-10-CM

## 2022-08-17 LAB
CHOLEST SERPL-MCNC: 267 MG/DL
HBA1C MFR BLD: 6.9 % (ref 0–5.6)
HDLC SERPL-MCNC: 33 MG/DL
LDLC SERPL CALC-MCNC: ABNORMAL MG/DL
LDLC SERPL DIRECT ASSAY-MCNC: 143 MG/DL
NONHDLC SERPL-MCNC: 234 MG/DL
TRIGL SERPL-MCNC: 491 MG/DL
URATE SERPL-MCNC: 8.3 MG/DL (ref 3.4–7)

## 2022-08-17 PROCEDURE — 90677 PCV20 VACCINE IM: CPT | Performed by: FAMILY MEDICINE

## 2022-08-17 PROCEDURE — 83721 ASSAY OF BLOOD LIPOPROTEIN: CPT | Mod: 59 | Performed by: FAMILY MEDICINE

## 2022-08-17 PROCEDURE — 90471 IMMUNIZATION ADMIN: CPT | Performed by: FAMILY MEDICINE

## 2022-08-17 PROCEDURE — 83036 HEMOGLOBIN GLYCOSYLATED A1C: CPT | Performed by: FAMILY MEDICINE

## 2022-08-17 PROCEDURE — 90472 IMMUNIZATION ADMIN EACH ADD: CPT | Performed by: FAMILY MEDICINE

## 2022-08-17 PROCEDURE — 36415 COLL VENOUS BLD VENIPUNCTURE: CPT | Performed by: FAMILY MEDICINE

## 2022-08-17 PROCEDURE — 84550 ASSAY OF BLOOD/URIC ACID: CPT | Performed by: FAMILY MEDICINE

## 2022-08-17 PROCEDURE — 80061 LIPID PANEL: CPT | Performed by: FAMILY MEDICINE

## 2022-08-17 PROCEDURE — 90746 HEPB VACCINE 3 DOSE ADULT IM: CPT | Performed by: FAMILY MEDICINE

## 2022-08-17 PROCEDURE — 99215 OFFICE O/P EST HI 40 MIN: CPT | Performed by: FAMILY MEDICINE

## 2022-08-17 RX ORDER — ATORVASTATIN CALCIUM 10 MG/1
10 TABLET, FILM COATED ORAL DAILY
Qty: 90 TABLET | Refills: 4 | Status: SHIPPED | OUTPATIENT
Start: 2022-08-17 | End: 2022-12-12

## 2022-08-17 RX ORDER — INDOMETHACIN 50 MG/1
50 CAPSULE ORAL 2 TIMES DAILY WITH MEALS
Qty: 50 CAPSULE | Refills: 4 | Status: SHIPPED | OUTPATIENT
Start: 2022-08-17 | End: 2023-09-14

## 2022-08-17 RX ORDER — ATORVASTATIN CALCIUM 20 MG/1
20 TABLET, FILM COATED ORAL DAILY
Qty: 90 TABLET | Refills: 4 | Status: SHIPPED | OUTPATIENT
Start: 2022-08-17 | End: 2023-09-14

## 2022-08-17 RX ORDER — ALLOPURINOL 100 MG/1
100 TABLET ORAL DAILY
Qty: 90 TABLET | Refills: 4 | Status: SHIPPED | OUTPATIENT
Start: 2022-08-17 | End: 2023-03-03 | Stop reason: DRUGHIGH

## 2022-08-17 NOTE — PROGRESS NOTES
Assessment & Plan     Type 2 diabetes mellitus with other specified complication, without long-term current use of insulin (H)  Referred for eye exam; did food exam, continue lisinopril and statin  - Hemoglobin A1c  - Adult Eye  Referral  - Hemoglobin A1c  - Lipid panel reflex to direct LDL Non-fasting  - Lipid panel reflex to direct LDL Non-fasting  - LDL cholesterol direct    Has received third dose of hepatitis B vaccine  given  - HEPATITIS B VACCINE, ADULT, IM    Chronic gout involving toe with tophus, unspecified cause, unspecified laterality  Has gout, manage with allopurinol with prn indomethacin (and no ibuprofen with that) plus acetaminophen prn  - Uric acid  - Uric acid  - indomethacin (INDOCIN) 50 MG capsule  Dispense: 50 capsule; Refill: 4    Need for pneumococcal vaccination  given  - Pneumococcal 20 Valent Conjugate (Prevnar 20)    Hyperlipidemia LDL goal <100  Needs atorvastatin - have to check to be sure he is taking it. Will adjust dose prn  - Lipid panel reflex to direct LDL Non-fasting  - Lipid panel reflex to direct LDL Non-fasting  - LDL cholesterol direct    Chronic gout of left ankle, unspecified cause  Confirmed high uric acid today so will start allopurinol  - allopurinol (ZYLOPRIM) 100 MG tablet  Dispense: 90 tablet; Refill: 4      Review of external notes as documented elsewhere in note  Ordering of each unique test  Prescription drug management  60 minutes spent on the date of the encounter doing chart review, history and exam, documentation and further activities per the note    Return in about 3 months (around 11/17/2022) for Follow up.    Hanny Vela MD  [facility] SHANIQUE Shepherd is a 42 year old accompanied by his son, presenting for the following health issues:  Diabetes      History of Present Illness       Diabetes:   He presents for follow up of diabetes.  He is not checking blood glucose. He is concerned about other.  He is not  "experiencing numbness or burning in feet, excessive thirst, blurry vision, weight changes or redness, sores or blisters on feet. The patient has had a diabetic eye exam in the last 12 months. Eye exam performed on 8/1/2022. Location of last eye exam done at a high school.          Cora Senior is here about his diabetes, his work-ability form and his hip surgery, plus gout.    Thet had been taking the metformin, but stopped it a couple weeks ago.    He has left ankle pain from what he thinks is gout. The ankle pain is somewhat better, but now the pain on the side of his foot it worse - likely from the same problem. He takes ibuprofen for this but is barely getting by. He wants additional or different medication to help him more. 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 He has taken allopurinol in the past with good benefit.    He saw the hip doctor and hopes his quitting smoking will help him to get surgery sooner. Cora Senior quit smoking 3 weeks ago. He admits the first couple days were difficult, but now he is used to it and no longer plans to smoke.    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    He needs the medical opinion form completed. He will try to get a copy to us, again.    Review of Systems         Objective    BP 96/62   Pulse 105   Temp 98  F (36.7  C) (Oral)   Resp 16   Ht 1.778 m (5' 10\")   Wt 93.9 kg (207 lb)   SpO2 96%   BMI 29.70 kg/m    Body mass index is 29.7 kg/m .  Physical Exam   GENERAL: alert, no distress and over weight  NECK: no adenopathy, no asymmetry, masses, or scars and thyroid normal to palpation  MS: left ankle is slightly red and slightly swollen, extending down on the lateral side at the base of the 5th toe, no edema  PSYCH: mentation appears normal, affect " normal/bright  Diabetic foot exam: normal DP and PT pulses, no trophic changes or ulcerative lesions and normal sensory exam    Results for orders placed or performed in visit on 08/17/22 (from the past 24 hour(s))   Hemoglobin A1c   Result Value Ref Range    Hemoglobin A1C 6.9 (H) 0.0 - 5.6 %   Uric acid   Result Value Ref Range    Uric Acid 8.3 (H) 3.4 - 7.0 mg/dL   Lipid panel reflex to direct LDL Non-fasting   Result Value Ref Range    Cholesterol 267 (H) <200 mg/dL    Triglycerides 491 (H) <150 mg/dL    Direct Measure HDL 33 (L) >=40 mg/dL    LDL Cholesterol Calculated      Non HDL Cholesterol 234 (H) <130 mg/dL    Narrative    Cholesterol  Desirable:  <200 mg/dL    Triglycerides  Normal:  Less than 150 mg/dL  Borderline High:  150-199 mg/dL  High:  200-499 mg/dL  Very High:  Greater than or equal to 500 mg/dL    Direct Measure HDL  Female:  Greater than or equal to 50 mg/dL   Male:  Greater than or equal to 40 mg/dL    LDL Cholesterol  Desirable:  <100mg/dL  Above Desirable:  100-129 mg/dL   Borderline High:  130-159 mg/dL   High:  160-189 mg/dL   Very High:  >= 190 mg/dL    Non HDL Cholesterol  Desirable:  130 mg/dL  Above Desirable:  130-159 mg/dL  Borderline High:  160-189 mg/dL  High:  190-219 mg/dL  Very High:  Greater than or equal to 220 mg/dL   LDL cholesterol direct   Result Value Ref Range    LDL Cholesterol Direct 143 (H) <100 mg/dL               .  ..

## 2022-08-18 ENCOUNTER — TELEPHONE (OUTPATIENT)
Dept: FAMILY MEDICINE | Facility: CLINIC | Age: 42
End: 2022-08-18

## 2022-08-18 NOTE — TELEPHONE ENCOUNTER
Patient Returning Call    Reason for call:  Asking if Dr. Vela has ordered meds for gout    Information relayed to patient:  Yes, Dr. Vela ordered allopurinol, atorvastatin, and indomethacin and sent to [facility] in [place].  Pt understands Thanks    Patient has additional questions:  No    Call taken on 8/18/22 at 420 pm by Margie Plata CMA TC

## 2022-08-19 ENCOUNTER — TELEPHONE (OUTPATIENT)
Dept: FAMILY MEDICINE | Facility: CLINIC | Age: 42
End: 2022-08-19

## 2022-08-19 NOTE — TELEPHONE ENCOUNTER
Author updated that the form has been misplaced/lost.     New form prepared for MD. Copy of prepared form retrained in RN office.     New form placed in provider's blue folder today.

## 2022-08-19 NOTE — TELEPHONE ENCOUNTER
Called patient form was mailed and brought in twice but clinic misplaced. Reprinted form from patient sent earlier via [facility].    JAYMIE Raines, RN  [facility]

## 2022-08-19 NOTE — TELEPHONE ENCOUNTER
This patient has been waiting for me to complete a medical opinion form. Please call [facility]. I want to get this form and complete it today 8/19

## 2022-08-23 ENCOUNTER — TELEPHONE (OUTPATIENT)
Dept: FAMILY MEDICINE | Facility: CLINIC | Age: 42
End: 2022-08-23
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
## 2022-08-23 NOTE — TELEPHONE ENCOUNTER
Please call Cora Senior and let him know his medical opinion paper was turned in. Ask if he wants his own copy of this?    Also, please help him to schedule an appointment with me in December.    thanks

## 2022-08-29 ENCOUNTER — TELEPHONE (OUTPATIENT)
Dept: FAMILY MEDICINE | Facility: CLINIC | Age: 42
End: 2022-08-29

## 2022-08-29 NOTE — TELEPHONE ENCOUNTER
Please call Thet Parrish. Let him know Dr. Vela heard back from the [facility] surgeon. He says it will take some time to be certain That Parrish is not infected by something that could infect a new hip. They understand we want things to move along, but it could be several months or longer before they would offer a surgery of replacing his hip. Whenever the [facility] calls for an appointment, he should always try to be seen as soon as possible.

## 2022-09-30 ENCOUNTER — IMMUNIZATION (OUTPATIENT)
Dept: FAMILY MEDICINE | Facility: CLINIC | Age: 42
End: 2022-09-30
Payer: COMMERCIAL

## 2022-09-30 PROCEDURE — 0134A COVID-19,PF,MODERNA BIVALENT: CPT

## 2022-09-30 PROCEDURE — 91313 COVID-19,PF,MODERNA BIVALENT: CPT

## 2022-10-03 ENCOUNTER — HEALTH MAINTENANCE LETTER (OUTPATIENT)
Age: 42
End: 2022-10-03

## 2022-10-19 ENCOUNTER — THERAPY VISIT (OUTPATIENT)
Dept: SLEEP MEDICINE | Facility: CLINIC | Age: 42
End: 2022-10-19
Attending: PHYSICIAN ASSISTANT
Payer: COMMERCIAL

## 2022-10-19 DIAGNOSIS — G47.30 OBSERVED SLEEP APNEA: ICD-10-CM

## 2022-10-19 DIAGNOSIS — G47.33 OSA (OBSTRUCTIVE SLEEP APNEA): ICD-10-CM

## 2022-10-19 DIAGNOSIS — R06.83 SNORING: ICD-10-CM

## 2022-10-19 PROCEDURE — 95810 POLYSOM 6/> YRS 4/> PARAM: CPT | Performed by: INTERNAL MEDICINE

## 2022-10-20 NOTE — PATIENT INSTRUCTIONS
[facility]    1. Your sleep study will be reviewed by a sleep physician within the next few days.     2. Please follow up in the sleep clinic as scheduled, or, make an appointment with your sleep provider to be seen within two weeks to discuss the results of the sleep study.    3. If you have any questions or problems with your treatment plan, please contact your sleep clinic provider at [phone] to further manage your condition.    4. Please review your attached medication list, and, at your follow-up appointment advise your sleep clinic provider about any changes.    5. Go to [email or web address] for more information about your sleep problems.    Robert Pugh CNA, RPSGT  October 20, 2022

## 2022-10-20 NOTE — PROGRESS NOTES
Diagnostic PSG completed per provider order.  Patient met criteria for PAP therapy.    Robert Pugh  Polysomngraphy [facility]

## 2022-10-21 LAB — SLPCOMP: NORMAL

## 2022-10-21 NOTE — PROCEDURES
" SLEEP STUDY INTERPRETATION  DIAGNOSTIC POLYSOMNOGRAPHY REPORT      Patient: RADHA HAQ  YOB: 1980  Study Date: 10/19/2022  MRN: [id number]  Referring Provider: Self  Ordering Provider: Goltz, Bennett Ezra, PA-C    Indications for Polysomnography: The patient is a 42 year old Male who is 5' 10\" and weighs 210.0 lbs. His BMI is 30.4, [place] sleepiness scale 13/24 and neck circumference is - cm. A diagnostic polysomnogram was performed to evaluate for sleep apnea.    Polysomnogram Data: A full night polysomnogram recorded the standard physiologic parameters including EEG, EOG, EMG, ECG, nasal and oral airflow. Respiratory parameters of chest and abdominal movements were recorded with respiratory inductance plethysmography. Oxygen saturation was recorded by pulse oximetry. Hypopnea scoring rule used: 1B 4%.    Sleep Architecture: Fragmented sleep.   The total recording time of the polysomnogram was 422.5 minutes. The total sleep time was 350.5 minutes. Sleep latency was increased at 32.5 minutes without the use of a sleep aid. REM latency was 135.5 minutes. Arousal index was increased at 60.8 arousals per hour. Sleep efficiency was decreased at 83.0%. Wake after sleep onset was 39.5 minutes. The patient spent 13.7% of total sleep time in Stage N1, 45.1% in Stage N2, 27.1% in Stage N3, and 14.1% in REM. Time in REM supine was 49.5 minutes.    Respiration: Severe obstructive sleep apnea and associated oxygen desaturations.     Events ? The polysomnogram revealed a presence of 307 obstructive, 2 central, and 2 mixed apneas resulting in an apnea index of 53.2 events per hour. There were 88 obstructive hypopneas and - central hypopneas resulting in an obstructive hypopnea index of 15.1 and central hypopnea index of - events per hour. The combined apnea/hypopnea index was 68.3 events per hour (central apnea/hypopnea index was 0.3 events per hour). The REM AHI was 87.3 events per hour. The supine AHI was 96.7 " events per hour. The RERA index was 1.0 events per hour.  The RDI was 69.3 events per hour.    Snoring - was reported as loud.    Respiratory rate and pattern - was notable for normal respiratory rate and pattern.    Sustained Sleep Associated Hypoventilation - Transcutaneous carbon dioxide monitoring was not used.    Sleep Associated Hypoxemia - (Greater than 5 minutes O2 sat at or below 88%) was present. Baseline oxygen saturation was 88.8%. Lowest oxygen saturation was 66.0%. Time spent less than or equal to 88% was 126.3 minutes. Time spent less than or equal to 89% was 137.5 minutes.    Movement Activity: Negative for movement abnormalities.     Periodic Limb Activity - There were - PLMs during the entire study. The PLM index was - movements per hour. The PLM Arousal Index was - per hour.    REM EMG Activity - Excessive transient/sustained muscle activity was not present.    Nocturnal Behavior - Abnormal sleep related behaviors were not noted during/arising out of NREM / REM sleep.     Bruxism - None apparent.    Cardiac Summary: Sinus rhythm.   The average pulse rate was 86.5 bpm. The minimum pulse rate was 56.0 bpm while the maximum pulse rate was 115.0 bpm.  Arrhythmias were not noted.    Assessment:     Severe obstructive sleep apnea with associated sleep fragmentation, oxygen desaturations and sleep associated hypoxemia. Respiratory events were exacerbated in supine sleep. .     Recommendations:    CPAP therapy is recommended for severe obstructive sleep apnea. Although PAP titration PSG will be ideal, treatment could be empirically initiated with Auto?titrating PAP therapy with a range of 5 to 15 cmH2O. Recommend clinical follow up with sleep management team.    Weight management.    Diagnostic Codes:   Obstructive Sleep Apnea G47.33  Sleep Hypoxemia G47.36   Repetitive Intrusions Into Sleep F51.8    10/19/2022 [facility] Sleep Study (210.0 lbs) - AHI 68.3, RDI 69.3, Supine AHI 96.7, REM AHI  87.3, Low O2 66.0%, Time Spent ?88% 126.3 minutes / Time Spent ?89% 137.5 minutes.      _____________________________________   Electronically Signed By: Amador purdy MD 10/21/2022

## 2022-11-01 NOTE — PROGRESS NOTES
Thet is a 42 year old who is being evaluated via a billable video visit.      How would you like to obtain your AVS? MyChart  If the video visit is dropped, the invitation should be resent by: Send to e-mail at: [email or web address]  Will anyone else be joining your video visit? No        Video-Visit Details    Video Start Time: 4:03 PM    Type of service:  Video Visit    Video End Time:4:37 PM    Originating Location (pt. Location): Home        Distant Location (provider location):  On-site    Platform used for Video Visit: Go World!    Sleep Follow-Up Visit:    Date on this visit: 11/2/2022    Cora Senior comes in today for follow-up of his sleep study done on 10/19/22. He was initially seen for loud snoring and observed pauses in breathing.  The visit is conducted with a Vin[employer] , #[id number].    PMHx: Cardiomegaly (per refugee health papers), DM 2, PTSD.     PSG Results:  Weight: 210 pounds  Sleep Architecture: Fragmented sleep.   The total recording time of the polysomnogram was 422.5 minutes. The total sleep time was 350.5 minutes. Sleep latency was increased at 32.5 minutes without the use of a sleep aid. REM latency was 135.5 minutes. Arousal index was increased at 60.8 arousals per hour. Sleep efficiency was decreased at 83.0%. Wake after sleep onset was 39.5 minutes. The patient spent 13.7% of total sleep time in Stage N1, 45.1% in Stage N2, 27.1% in Stage N3, and 14.1% in REM. Time in REM supine was 49.5 minutes.     Respiration: Severe obstructive sleep apnea and associated oxygen desaturations.     Events ? The polysomnogram revealed a presence of 307 obstructive, 2 central, and 2 mixed apneas resulting in an apnea index of 53.2 events per hour. There were 88 obstructive hypopneas and - central hypopneas resulting in an obstructive hypopnea index of 15.1 and central hypopnea index of - events per hour. The combined apnea/hypopnea index was 68.3 events per hour (central apnea/hypopnea index was 0.3  events per hour). The REM AHI was 87.3 events per hour. The supine AHI was 96.7 events per hour. The RERA index was 1.0 events per hour.  The RDI was 69.3 events per hour.    Snoring - was reported as loud.    Respiratory rate and pattern - was notable for normal respiratory rate and pattern.    Sustained Sleep Associated Hypoventilation - Transcutaneous carbon dioxide monitoring was not used.    Sleep Associated Hypoxemia - (Greater than 5 minutes O2 sat at or below 88%) was present. Baseline oxygen saturation was 88.8%. Lowest oxygen saturation was 66.0%. Time spent less than or equal to 88% was 126.3 minutes. Time spent less than or equal to 89% was 137.5 minutes.     Movement Activity: Negative for movement abnormalities.     Periodic Limb Activity - There were - PLMs during the entire study. The PLM index was - movements per hour. The PLM Arousal Index was - per hour.    REM EMG Activity - Excessive transient/sustained muscle activity was not present.    Nocturnal Behavior - Abnormal sleep related behaviors were not noted during/arising out of NREM / REM sleep.     Bruxism - None apparent.     Cardiac Summary: Sinus rhythm.   The average pulse rate was 86.5 bpm. The minimum pulse rate was 56.0 bpm while the maximum pulse rate was 115.0 bpm.  Arrhythmias were not noted.    Past medical/surgical history, family history, social history, medications and allergies were reviewed.      Problem List:  Patient Active Problem List    Diagnosis Date Noted     Diabetes mellitus, type 2 (H) 04/20/2022     Priority: Medium     Primary osteoarthritis of left hip 04/20/2022     Priority: Medium     Cardiomegaly 04/20/2022     Priority: Medium     Keloid scar 04/20/2022     Priority: Medium     SULTANA (obstructive sleep apnea) 04/20/2022     Priority: Medium     10/19/2022 [facility] Sleep Study (210.0 lbs) - AHI 68.3, RDI 69.3, Supine AHI 96.7, REM AHI 87.3, Low O2 66.0%, Time Spent ?88% 126.3 minutes / Time Spent ?89%  137.5 minutes.       Class 2 severe obesity due to excess calories with serious comorbidity and body mass index (BMI) of 35.0 to 35.9 in adult (H) 04/20/2022     Priority: Medium        Impression/Plan:    (G47.33) SULTANA (obstructive sleep apnea)  (primary encounter diagnosis)  Comment: severe SULTANA, AHI 68/hr. Supine AHI was 96/hr, non-supine 16/hr, but no REM was observed laterally. He has hypoxemia secondary to the apnea, his O2 baseline was maintained above 90%.   Plan: Comprehensive DME         I am prescribing auto CPAP 5-15 cm, heated humidifier and compliance meter. The patient was informed of the mask exchange policy and the compliance goals. They were also informed that they would be followed by the sleep therapy management team during the first month of CPAP use.      He will follow up with me in about 2 month(s).     36 minutes were spent on the date of the encounter doing chart review, history and exam, documentation and further activities as noted above.     Bennett Goltz, PA-C    CC: Hanny Vela MD

## 2022-11-02 ENCOUNTER — VIRTUAL VISIT (OUTPATIENT)
Dept: SLEEP MEDICINE | Facility: CLINIC | Age: 42
End: 2022-11-02
Payer: COMMERCIAL

## 2022-11-02 DIAGNOSIS — G47.33 OSA (OBSTRUCTIVE SLEEP APNEA): Primary | ICD-10-CM

## 2022-11-02 PROCEDURE — 99214 OFFICE O/P EST MOD 30 MIN: CPT | Mod: 95 | Performed by: PHYSICIAN ASSISTANT

## 2022-11-02 ASSESSMENT — SLEEP AND FATIGUE QUESTIONNAIRES
HOW LIKELY ARE YOU TO NOD OFF OR FALL ASLEEP WHEN YOU ARE A PASSENGER IN A CAR FOR AN HOUR WITHOUT A BREAK: SLIGHT CHANCE OF DOZING
HOW LIKELY ARE YOU TO NOD OFF OR FALL ASLEEP WHILE SITTING AND TALKING TO SOMEONE: WOULD NEVER DOZE
HOW LIKELY ARE YOU TO NOD OFF OR FALL ASLEEP IN A CAR, WHILE STOPPED FOR A FEW MINUTES IN TRAFFIC: WOULD NEVER DOZE
HOW LIKELY ARE YOU TO NOD OFF OR FALL ASLEEP WHILE LYING DOWN TO REST IN THE AFTERNOON WHEN CIRCUMSTANCES PERMIT: SLIGHT CHANCE OF DOZING
HOW LIKELY ARE YOU TO NOD OFF OR FALL ASLEEP WHILE SITTING INACTIVE IN A PUBLIC PLACE: MODERATE CHANCE OF DOZING
HOW LIKELY ARE YOU TO NOD OFF OR FALL ASLEEP WHILE WATCHING TV: MODERATE CHANCE OF DOZING
HOW LIKELY ARE YOU TO NOD OFF OR FALL ASLEEP WHILE SITTING AND READING: MODERATE CHANCE OF DOZING
HOW LIKELY ARE YOU TO NOD OFF OR FALL ASLEEP WHILE SITTING QUIETLY AFTER LUNCH WITHOUT ALCOHOL: SLIGHT CHANCE OF DOZING

## 2022-11-02 NOTE — PATIENT INSTRUCTIONS
You will be provided with an auto-titrating CPAP with a pressure range of 5-15 cm with heated humidity to limit nasal congestion. Adjust the heat level on humidifier to find a setting that prevents dry nose but does not cause condensation in the hose or mask. Use distilled water in the humidifier.  The CPAP has a ramp function that starts the pressure lower than your prescribed pressure and gradually increases it over a number of minutes.  This may make it easier to fall asleep.    Try to use the CPAP every-night, all night (minimum of 4 hours). Many insurances require that we prove you are using the CPAP at least 4 hours on at least 70% of nights over a 30 day period. We have 90 days to meet those criteria.            Discussed weight management and the impact of weight gain on sleep apnea.  Let me know if you snore or feel the pressure is too high.    You can get new supplies (mask, hose and filter) for your CPAP every 3-6 months, covered by insurance. You do not need to get supplies that often, but they are available if you would like them.  You may exchange the mask once within the first month if you feel the initial mask does not fit well.  Contact your medical equipment provider for equipment issues.  Please let me know if you have any return of snoring, daytime sleepiness or poor sleep quality. We will want to make sure your CPAP is adequately treating your apnea.  There is a website called CPAP.com that has accessories that may make CPAP use easier. Please visit it at your convenience.  Our phone number is [phone].    Follow-up 2 month.  Bring your CPAP machine with you to the follow up appointment.

## 2022-11-11 ENCOUNTER — TELEPHONE (OUTPATIENT)
Dept: SLEEP MEDICINE | Facility: CLINIC | Age: 42
End: 2022-11-11

## 2022-11-16 ENCOUNTER — DOCUMENTATION ONLY (OUTPATIENT)
Dept: SLEEP MEDICINE | Facility: CLINIC | Age: 42
End: 2022-11-16
Payer: COMMERCIAL

## 2022-11-16 DIAGNOSIS — G47.33 OBSTRUCTIVE SLEEP APNEA (ADULT) (PEDIATRIC): Primary | ICD-10-CM

## 2022-11-16 NOTE — PROGRESS NOTES
Patient was offered choice of vendor and chose [facility].  Patient Cora Senior was set up at [place]  on November 16, 2022. Patient received a Resmed [id number] Pressures were set at 5-15 cm H2O.   Patient s ramp is 5 cm H2O for Off and FLEX/EPR is EPR, 2.  Patient received a Resmed Mask name: Airfit F20  Full Face mask size Medium, heated tubing and heated humidifier.  Patient does need to meet compliance. Patient has a follow up on TBD with Dr. Naveed Connor Her

## 2022-11-21 ENCOUNTER — DOCUMENTATION ONLY (OUTPATIENT)
Dept: SLEEP MEDICINE | Facility: CLINIC | Age: 42
End: 2022-11-21
Payer: COMMERCIAL

## 2022-11-21 NOTE — PROGRESS NOTES
3 day Sleep therapy management telephone visit    Diagnostic AHI: 68.3  PSG    Confirmed with patient at time of call- N/A Patient is still interested in STM service       Data only recheck        Objective data     Order Settings for PAP  CPAP min 5    CPAP max 15             Device settings from machine CPAP min 5.0     CPAP max 15.0           EPR Setting TWO    RESMED soft response  OFF     Assessment: Nightly usage over four hours      Action plan: Patient to have 14 day STM visit. Patient has a follow up visit scheduled:   no    Replacement device: No  STM ordered by provider: Yes     Total time spent on accessing and  interpreting remote patient PAP therapy data  10 minutes    Total time spent counseling, coaching  and reviewing PAP therapy data with patient  0 minutes    [id number] no

## 2022-11-25 ENCOUNTER — DOCUMENTATION ONLY (OUTPATIENT)
Dept: HOME HEALTH SERVICES | Facility: CLINIC | Age: 42
End: 2022-11-25

## 2022-11-25 DIAGNOSIS — G47.33 OSA (OBSTRUCTIVE SLEEP APNEA): Primary | ICD-10-CM

## 2022-11-25 NOTE — PROGRESS NOTES
Cpap mask fitting - 30 day mask exchange  Date: 11/25/2022  Location of mask fitting: [facility]  Reason for mask fitting: Patient has the AIRFIT F20 - he states it is leaking into his eyes and he's having to adjust it a lot at night. He wants to try a pillows style mask so it is less on his face.  Discussed/viewed the following masks:    1. Airfit p10 - pt went home with this mask today.  2. Mckeon fx   3. Dreamwear nasal mask    Mask and size selected: Airfit P10 Fitpack  Mask clarification needed: yes - pended in epic 11/25/2022

## 2022-12-01 ENCOUNTER — DOCUMENTATION ONLY (OUTPATIENT)
Dept: SLEEP MEDICINE | Facility: CLINIC | Age: 42
End: 2022-12-01
Payer: COMMERCIAL

## 2022-12-01 NOTE — PROGRESS NOTES
14  DAY STM VISIT    Diagnostic AHI: 68.3  PSG    Data only recheck     Assessment: Pt meeting objective benchmarks.       Action plan: pt to have 30 day STM visit.      Device type: Auto-CPAP    PAP settings: CPAP min 5.0 cm  H20       CPAP max 15.0 cm  H20      95th% pressure 10.5 cm  H20        RESMED EPR level Setting: TWO    RESMED Soft response setting:  OFF    Mask type:  Nasal Pillows    Objective measures: 14 day rolling measures      Compliance  78 %      Leak  31.02  lpm  last  upload      AHI 3.5   last  upload      Average number of minutes 274      Objective measure goal  Compliance   Goal >70%  Leak   Goal < 24 lpm  AHI  Goal < 5  Usage  Goal >240        Total time spent on accessing and interpreting remote patient PAP therapy data  10 minutes    Total time spent counseling, coaching  and reviewing PAP therapy data with patient  0 minutes    [id number]  [id number]  no (3 day STM)

## 2022-12-12 ENCOUNTER — OFFICE VISIT (OUTPATIENT)
Dept: FAMILY MEDICINE | Facility: CLINIC | Age: 42
End: 2022-12-12
Payer: COMMERCIAL

## 2022-12-12 ENCOUNTER — PATIENT OUTREACH (OUTPATIENT)
Dept: CARE COORDINATION | Facility: CLINIC | Age: 42
End: 2022-12-12

## 2022-12-12 VITALS
HEIGHT: 70 IN | SYSTOLIC BLOOD PRESSURE: 102 MMHG | RESPIRATION RATE: 16 BRPM | DIASTOLIC BLOOD PRESSURE: 60 MMHG | TEMPERATURE: 98 F | BODY MASS INDEX: 30.82 KG/M2 | OXYGEN SATURATION: 98 % | WEIGHT: 215.25 LBS | HEART RATE: 96 BPM

## 2022-12-12 DIAGNOSIS — Z78.9 NEED FOR FOLLOW-UP BY SOCIAL WORKER: ICD-10-CM

## 2022-12-12 DIAGNOSIS — M1A.9XX1 CHRONIC GOUT INVOLVING TOE WITH TOPHUS, UNSPECIFIED CAUSE, UNSPECIFIED LATERALITY: ICD-10-CM

## 2022-12-12 DIAGNOSIS — E78.5 HYPERLIPIDEMIA LDL GOAL <100: ICD-10-CM

## 2022-12-12 DIAGNOSIS — M16.12 PRIMARY OSTEOARTHRITIS OF LEFT HIP: ICD-10-CM

## 2022-12-12 DIAGNOSIS — E11.9 TYPE 2 DIABETES MELLITUS WITHOUT COMPLICATION, WITHOUT LONG-TERM CURRENT USE OF INSULIN (H): Primary | ICD-10-CM

## 2022-12-12 LAB
ALBUMIN SERPL BCG-MCNC: 4.3 G/DL (ref 3.5–5.2)
ALP SERPL-CCNC: 68 U/L (ref 40–129)
ALT SERPL W P-5'-P-CCNC: 8 U/L (ref 10–50)
ANION GAP SERPL CALCULATED.3IONS-SCNC: 12 MMOL/L (ref 7–15)
AST SERPL W P-5'-P-CCNC: 18 U/L (ref 10–50)
BILIRUB SERPL-MCNC: 0.3 MG/DL
BUN SERPL-MCNC: 12.2 MG/DL (ref 6–20)
CALCIUM SERPL-MCNC: 9.6 MG/DL (ref 8.6–10)
CHLORIDE SERPL-SCNC: 102 MMOL/L (ref 98–107)
CHOLEST SERPL-MCNC: 270 MG/DL
CREAT SERPL-MCNC: 1.1 MG/DL (ref 0.67–1.17)
DEPRECATED HCO3 PLAS-SCNC: 25 MMOL/L (ref 22–29)
ERYTHROCYTE [SEDIMENTATION RATE] IN BLOOD BY WESTERGREN METHOD: 14 MM/HR (ref 0–15)
GFR SERPL CREATININE-BSD FRML MDRD: 85 ML/MIN/1.73M2
GLUCOSE SERPL-MCNC: 140 MG/DL (ref 70–99)
HBA1C MFR BLD: 7.6 % (ref 0–5.6)
HDLC SERPL-MCNC: 32 MG/DL
LDLC SERPL CALC-MCNC: ABNORMAL MG/DL
LDLC SERPL DIRECT ASSAY-MCNC: 135 MG/DL
NONHDLC SERPL-MCNC: 238 MG/DL
POTASSIUM SERPL-SCNC: 4.1 MMOL/L (ref 3.4–5.3)
PROT SERPL-MCNC: 7.7 G/DL (ref 6.4–8.3)
SODIUM SERPL-SCNC: 139 MMOL/L (ref 136–145)
TRIGL SERPL-MCNC: 509 MG/DL

## 2022-12-12 PROCEDURE — 83721 ASSAY OF BLOOD LIPOPROTEIN: CPT | Mod: 59 | Performed by: FAMILY MEDICINE

## 2022-12-12 PROCEDURE — 83036 HEMOGLOBIN GLYCOSYLATED A1C: CPT | Performed by: FAMILY MEDICINE

## 2022-12-12 PROCEDURE — 90471 IMMUNIZATION ADMIN: CPT | Performed by: FAMILY MEDICINE

## 2022-12-12 PROCEDURE — 85652 RBC SED RATE AUTOMATED: CPT | Performed by: FAMILY MEDICINE

## 2022-12-12 PROCEDURE — 99214 OFFICE O/P EST MOD 30 MIN: CPT | Mod: 25 | Performed by: FAMILY MEDICINE

## 2022-12-12 PROCEDURE — 80053 COMPREHEN METABOLIC PANEL: CPT | Performed by: FAMILY MEDICINE

## 2022-12-12 PROCEDURE — 80061 LIPID PANEL: CPT | Performed by: FAMILY MEDICINE

## 2022-12-12 PROCEDURE — 90686 IIV4 VACC NO PRSV 0.5 ML IM: CPT | Performed by: FAMILY MEDICINE

## 2022-12-12 PROCEDURE — 36415 COLL VENOUS BLD VENIPUNCTURE: CPT | Performed by: FAMILY MEDICINE

## 2022-12-12 RX ORDER — METFORMIN HCL 500 MG
2000 TABLET, EXTENDED RELEASE 24 HR ORAL
Qty: 90 TABLET | Refills: 3 | Status: SHIPPED | OUTPATIENT
Start: 2022-12-12 | End: 2022-12-12

## 2022-12-12 RX ORDER — METFORMIN HCL 500 MG
2000 TABLET, EXTENDED RELEASE 24 HR ORAL
Qty: 120 TABLET | Refills: 11 | Status: SHIPPED | OUTPATIENT
Start: 2022-12-12 | End: 2024-01-02

## 2022-12-12 NOTE — PROGRESS NOTES
"  Assessment & Plan     Hyperlipidemia LDL goal <100  Reports compliance with atorvastatin, dose was increased from 10 mg to 20 mg daily in August.  Recheck labs today, nonfasting  - Lipid panel reflex to direct LDL Non-fasting  - Comprehensive metabolic panel (BMP + Alb, Alk Phos, ALT, AST, Total. Bili, TP)    Type 2 diabetes mellitus without complication, without long-term current use of insulin (H)  A1c to be collected with labs today.  Refill of metformin given.  - HEMOGLOBIN A1C  - Lipid panel reflex to direct LDL Non-fasting  - metFORMIN (GLUCOPHAGE XR) 500 MG 24 hr tablet  Dispense: 120 tablet; Refill: 11t    Need for follow-up by   Patient had some difficulty recently with paperwork and documentation to qualify for medical assistance and possibly other benefits.  Referral to  to help him with his questions.  - Primary Care - Care Coordination Referral    Chronic left hip pain  Patient states his PCP was working on a plan for him, when he could get a left hip replacement.  Previous notes and phone notes reviewed.  I will ask his PCP if she has additional information or recommendations.               BMI:   Estimated body mass index is 30.89 kg/m  as calculated from the following:    Height as of this encounter: 1.778 m (5' 10\").    Weight as of this encounter: 97.6 kg (215 lb 4 oz).           No follow-ups on file.    Sierra Fabian MD  [facility]    Julita   Thet is a 42 year old, presenting for the following health issues:  Follow Up  Declines     Was scheduled w PAP but appt was cancelled, and did not want to wait until her first available in Jan, since insurance uncertain    Dm2 - A1c was high 7 mos ago, but controlled 3 mos ago, he cut out a lot of sugars and carbs from diet. Does not check sugars at home. Takes metformin 2000mg/day (for some reason off his med list).     Wonders what he should do about chronic R hip issue - PCP spoke w " "specialist lidia RODRIGUEZ of [place], patient wondering if he can have IVONNE    Not sure what will happen after Jan 1 with his benefits and health insurance. States he had a lot of paperwork to fill out and was denied continuation of benefits? He is not certain what he needs to do     History of Present Illness       Diabetes:   He presents for follow up of diabetes.  He is not checking blood glucose. He has no concerns regarding his diabetes at this time.  He is not experiencing numbness or burning in feet, excessive thirst, blurry vision, weight changes or redness, sores or blisters on feet.         Reason for visit:  To ask detail plan of hip surgery    He eats 2-3 servings of fruits and vegetables daily.He consumes 0 sweetened beverage(s) daily.He exercises with enough effort to increase his heart rate 10 to 19 minutes per day.  He exercises with enough effort to increase his heart rate 4 days per week.   He is taking medications regularly.             Review of Systems         Objective    /60   Pulse 96   Temp 98  F (36.7  C) (Oral)   Resp 16   Ht 1.778 m (5' 10\")   Wt 97.6 kg (215 lb 4 oz)   SpO2 98%   BMI 30.89 kg/m    Body mass index is 30.89 kg/m .  Physical Exam   GENERAL: healthy, alert and no distress  EYES: Eyes grossly normal to inspection, PERRL and conjunctivae and sclerae normal  NECK: no adenopathy, no asymmetry, masses, or scars and thyroid normal to palpation  RESP: lungs clear to auscultation - no rales, rhonchi or wheezes  CV: regular rate and rhythm, normal S1 S2, no S3 or S4, no murmur, click or rub, no peripheral edema  MS: no gross musculoskeletal defects noted, no edema                    "

## 2022-12-12 NOTE — PROGRESS NOTES
Clinic Care Coordination Contact  Community Health Worker Initial Outreach    CHW Initial Information Gathering:  Referral Source: PCP  Preferred Hospital: [facility]  [phone]  Preferred Urgent Care: [facility] [place], [phone]  Current living arrangement:: I live in a private home with family  Type of residence:: Apartment  Community Resources: None  Supplies Currently Used at Home: None  Equipment Currently Used at Home: none  Informal Support system:: Family  No PCP office visit in Past Year: No  Transportation means:: Medical transport, Friend  CHW Additional Questions  If ED/Hospital discharge, follow-up appointment scheduled as recommended?: N/A  Medication changes made following ED/Hospital discharge?: N/A  MyChart active?: No  Patient agreeable to assistance with activating MyChart?: No    Patient accepts CC: Yes. Patient scheduled for assessment with CCC  on 12/14/2022 at 9:00 AM. Patient noted desire to discuss [facility] benefits.     Financial Resource Worker Screening    [facility] Benefits  Is patient requesting help applying for [facility] benefits?: Yes  Have you recently applied for any [facility] benefits?: Yes  What was applied for? : SNAP, CASH, Energy Assistance, Other  Application date:: November 2022  How many people in your household?: 3  Do you buy/eat food together?: Yes  What is the monthly gross income for the household (wages, social security, workers comp, and pension)? : 2000    Insurance:  Was [place] verified for active insurance?: No  Is this an insurance renewal?: No  Is this a new insurance application request?: No    Care Coordination team will tell patient:   Thank you for answering all the questions, based on screening questions, our Financial Resource Worker will reach out to you with additional questions and next steps.

## 2022-12-14 ENCOUNTER — PATIENT OUTREACH (OUTPATIENT)
Dept: NURSING | Facility: CLINIC | Age: 42
End: 2022-12-14
Payer: COMMERCIAL

## 2022-12-14 SDOH — HEALTH STABILITY: PHYSICAL HEALTH: ON AVERAGE, HOW MANY MINUTES DO YOU ENGAGE IN EXERCISE AT THIS LEVEL?: 30 MIN

## 2022-12-14 SDOH — ECONOMIC STABILITY: FOOD INSECURITY: WITHIN THE PAST 12 MONTHS, THE FOOD YOU BOUGHT JUST DIDN'T LAST AND YOU DIDN'T HAVE MONEY TO GET MORE.: NEVER TRUE

## 2022-12-14 SDOH — ECONOMIC STABILITY: TRANSPORTATION INSECURITY
IN THE PAST 12 MONTHS, HAS LACK OF TRANSPORTATION KEPT YOU FROM MEETINGS, WORK, OR FROM GETTING THINGS NEEDED FOR DAILY LIVING?: NO

## 2022-12-14 SDOH — ECONOMIC STABILITY: TRANSPORTATION INSECURITY
IN THE PAST 12 MONTHS, HAS THE LACK OF TRANSPORTATION KEPT YOU FROM MEDICAL APPOINTMENTS OR FROM GETTING MEDICATIONS?: NO

## 2022-12-14 SDOH — ECONOMIC STABILITY: INCOME INSECURITY: IN THE LAST 12 MONTHS, WAS THERE A TIME WHEN YOU WERE NOT ABLE TO PAY THE MORTGAGE OR RENT ON TIME?: NO

## 2022-12-14 SDOH — HEALTH STABILITY: PHYSICAL HEALTH: ON AVERAGE, HOW MANY DAYS PER WEEK DO YOU ENGAGE IN MODERATE TO STRENUOUS EXERCISE (LIKE A BRISK WALK)?: 3 DAYS

## 2022-12-14 SDOH — ECONOMIC STABILITY: FOOD INSECURITY: WITHIN THE PAST 12 MONTHS, YOU WORRIED THAT YOUR FOOD WOULD RUN OUT BEFORE YOU GOT MONEY TO BUY MORE.: NEVER TRUE

## 2022-12-14 ASSESSMENT — SOCIAL DETERMINANTS OF HEALTH (SDOH)
WITHIN THE LAST YEAR, HAVE YOU BEEN HUMILIATED OR EMOTIONALLY ABUSED IN OTHER WAYS BY YOUR PARTNER OR EX-PARTNER?: NO
HOW HARD IS IT FOR YOU TO PAY FOR THE VERY BASICS LIKE FOOD, HOUSING, MEDICAL CARE, AND HEATING?: NOT VERY HARD
WITHIN THE LAST YEAR, HAVE YOU BEEN KICKED, HIT, SLAPPED, OR OTHERWISE PHYSICALLY HURT BY YOUR PARTNER OR EX-PARTNER?: NO
WITHIN THE LAST YEAR, HAVE YOU BEEN AFRAID OF YOUR PARTNER OR EX-PARTNER?: NO
WITHIN THE LAST YEAR, HAVE TO BEEN RAPED OR FORCED TO HAVE ANY KIND OF SEXUAL ACTIVITY BY YOUR PARTNER OR EX-PARTNER?: NO

## 2022-12-14 ASSESSMENT — LIFESTYLE VARIABLES
SKIP TO QUESTIONS 9-10: 1
HOW OFTEN DO YOU HAVE A DRINK CONTAINING ALCOHOL: NEVER
HOW OFTEN DO YOU HAVE SIX OR MORE DRINKS ON ONE OCCASION: NEVER
HOW MANY STANDARD DRINKS CONTAINING ALCOHOL DO YOU HAVE ON A TYPICAL DAY: PATIENT DOES NOT DRINK
AUDIT-C TOTAL SCORE: 0

## 2022-12-14 NOTE — COMMUNITY RESOURCES LIST (PATIENT PREFERRED LANGUAGE)
12/14/2022   [facility] - Outpatient Clinics  N/A  ????????????????? ????????? ????????????????????????????????????????????????????????????????? ??????????? ????????????????????????????????? ????????? ????????????????????????? ??????????  Phone: [phone]   Email: N/A   Address: [facility] [place] [address]   Hours: N/A        ??????????????       ??? ?? ?????????????????  1  [facility] - ?????????? ?????????????????????? ????????: 0.82 ?????      ?????/???   [address] [place] [address]  ????????: ???????????  ????: ?????????? - ????????? 08:00 - 17:00  ???????: ??????????????????????, ?????   Phone: [phone] Email: [email or web address]     2  [facility] ????? [facility] - ?????????? ?????????????????????? ????????: 1.55 ?????      ?????/???   [address] [place] [address]  ????????: ???, ?????, ???????????  ????: ?????????? - ????????? 09:00 - 18:00  ???????: ??????????????????????, ?????   Phone: [phone] Email: [email or web address] Website: [email or web address]     ?????????????? ?????????????????  3  [employer] - ????????????????? ??????? ????????: 2.79 ?????      ????????????, ?????/???   [address] [place] [address]  ????????: ???????????  ????: ?????????? - ??????????? 12:00 - 17:00  ???????: ?????   Phone: [phone] Email: [email or web address] Website: [email or web address]     4  [facility] ?????????????? ????????: 3.3 ?????      ????????????, ?????/???   [address] [facility] [address] [place] [address]  ????????: ???????????  ????: ?????????? - ????????? 08:00 - 17:00  ???????: ?????   Phone: [phone] Email: [email or web address] Website: [email or web address]          ??????????????????????? ????????????       ???????????????????????   [address] [place] [address]    311  ??????????????????   [phone]  ???????????????????? Lifeline   (913) 212-4764 (TALK)  ?????????????????? Hotline   (146) 795-3875 (4-A-Child)  ??????????????????????????? hotline   (343) 769-8851 (HOPE)  ????????????????? Safeline   (635) 304-2839 (RUNAWAY)  [employer] Talkline   ([phone]) [phone]  ????????????????????????????????   [phone] (HELP)

## 2022-12-14 NOTE — LETTER
[facility]  Patient Centered Plan of Care  About Me:        Patient Name:  Cora Senior    YOB: 1980  Age:         42 year old   Leena MRN:    [id number] Telephone Information:  Home Phone [phone]   Mobile [phone]       Address:  [address] [place] [address] [facility] [address]  [place] [address] Email address:  [email or web address]      Emergency Contact(s)    Name Relationship Lgl Grd Work Phone Home Phone Mobile Phone   ASHLEIGH AJ Spouse    [phone]           Primary language:  Indonesian     needed? Yes   [employer]:  [phone] op. 1  Other communication barriers:No data recorded  Preferred Method of Communication:     Current living arrangement: I live in a private home with family    Mobility Status/ Medical Equipment: No data recorded      Health Maintenance  Health Maintenance Reviewed: No data recorded    My Access Plan  Medical Emergency 911   Primary Clinic Line [facility] [phone]   24 Hour Appointment Line [phone] or  [phone]) (toll-free)   24 Hour Nurse Line [phone] (toll-free)   Preferred Urgent Care [facility] [phone]     [facility]  [phone]     Preferred Pharmacy No Pharmacies Listed   Behavioral Health Crisis Line The National Suicide Prevention Lifeline at 1-534.299.5751 or Text/Call 198             My Care Team Members  Patient Care Team       Relationship Specialty Notifications Start End    Hanny Vela MD PCP - General Family Medicine  4/20/22     Phone: [phone] Fax: [phone]         [address] [place] [address]    Hanny Vela MD Assigned PCP   4/1/22     Phone: [phone] Fax: [phone]         [address] [facility] [address] [place] [address]    Zulay MILAN    4/7/22     Phone: [phone]         Goltz, Bennett Ezra, PA-C Assigned Neuroscience Provider    7/23/22     Phone: [phone] Fax: [phone] [address] [facility] [address]    Joe Fraire MD Assigned Musculoskeletal Provider   7/30/22     Phone: [phone] Fax: [phone] [address] [facility] [address]    Giovani Green Community Health Worker Primary Care - CC Admissions 12/12/22     Phone: [phone] Fax: [phone]         [address] [place] [address] [address] [facility] [address]    Velia Santana LGSW Lead Care Coordinator  Admissions 12/12/22     Blanca Jensen MA Financial Resource Worker   12/13/22             My Care Plans  Self Management and Treatment Plan  Care Plan  Care Plan: Social Security Card     Problem: Social Security Number issues     Priority: Medium Onset Date: 12/14/2022    Note:     Barriers: accessability  Strengths:motivated to resolve social security number issues  Patient expressed understanding of goal: yes  Action steps to achieve this goal:  1. I will go to the social security office   2. I will follow up as needed with social security  3. I will update CCSW as to my progress                Care Plan: Financial Wellbeing     Problem: Patient expresses financial resource strain     Goal: Create an action plan to increase financial stability     Start Date: 12/14/2022 Expected End Date: 12/14/2023    This Visit's Progress: 20%    Priority: High    Note:     Barriers:understanding complex systems  Strengths: motivated to seek assistance  Patient expressed understanding of goal: yes  Action steps to achieve this goal:  1. I will answer the phone when FRW calls  2. I will explain my situation to FRW  3. I will follow up with CCSW with any further questions or needs.                          Action Plans on File:                       Advance Care Plans/Directives Type:   No data recorded    My Medical and Care Information  Problem List   Patient Active Problem List   Diagnosis     Diabetes mellitus, type 2 (H)     Primary osteoarthritis of  left hip     Cardiomegaly     Keloid scar     SULTANA (obstructive sleep apnea)     Class 2 severe obesity due to excess calories with serious comorbidity and body mass index (BMI) of 35.0 to 35.9 in adult (H)      Current Medications and Allergies:  See printed Medication Report.    Care Coordination Start Date: 12/12/2022   Frequency of Care Coordination: No data recorded   Form Last Updated: 12/14/2022

## 2022-12-14 NOTE — LETTER
[facility] CARE COORDINATION  [facility]   December 14, 2022    Cora Senior  [address]   [place] [address]      Dear Cora,        I am a clinic care coordinator who works with Hanny Vela MD with the [facility]. I wanted to thank you for spending the time to talk with me.  Below is a description of clinic care coordination and how I can further assist you.       The clinic care coordination team is made up of a registered nurse, , financial resource worker and community health worker who understand the health care system. The goal of clinic care coordination is to help you manage your health and improve access to the health care system. Our team works alongside your provider to assist you in determining your health and social needs. We can help you obtain health care and community resources, providing you with necessary information and education. We can work with you through any barriers and develop a care plan that helps coordinate and strengthen the communication between you and your care team.    Please feel free to contact me with any questions or concerns regarding care coordination and what we can offer.      We are focused on providing you with the highest-quality healthcare experience possible.    Sincerely,     Velia Santana, ESTEPHAN[place], [facility]  Social Work Care Coordinator

## 2022-12-14 NOTE — COMMUNITY RESOURCES LIST (ENGLISH)
12/14/2022   [facility] - Outpatient Clinics  N/A  For questions about this resource list or additional care needs, please contact your primary care clinic or care manager.  Phone: [phone]   Email: N/A   Address: [address] [place] [place] [address]   Hours: N/A        Mental Health       Individual counseling  1  Barbara KEISHA Seth Distance: 0.82 miles      Phone/Virtual   [address] [place] [address]  Language: English  Hours: Mon - Fri 8:00 AM - 5:00 PM  Fees: Insurance, Self Pay   Phone: (243) [phone] Email: [email or web address]     2  [facility] Distance: 1.55 miles      Phone/Virtual   [address] [place] [address]  Language: English, Hmong, Yemeni  Hours: Mon - Fri 9:00 AM - 6:00 PM  Fees: Insurance, Self Pay   Phone: (614) 689-9862 Email: info@[facility] HospitalBrightstar. Website: https://www.[facility] ServicesSiteskin Web Solution/[place] [employer] SSD/[facility]/     Mental health support group  3  Emotions Anonymous International - Emotions Anonymous Distance: 2.79 miles      In-Person, Phone/Virtual   [address] [place] [address]  Language: English  Hours: Mon - Thu 12:00 PM - 5:00 PM  Fees: Free   Phone: (936) 166-7087 Email: info@emotionsAriel Way.org Website: http://RewardMyWay.org/     4  RONALDO Crisis Resources Distance: 3.3 miles      In-Person, Phone/Virtual   [address] [facility] [address] [place] [address]  Language: English  Hours: Mon - Fri 8:00 AM - 5:00 PM  Fees: Free   Phone: (585) 162-5564 Email: liberty@[facility].org Website: http://www.namihelps.org/support/crisis-resources          Important Numbers & Websites       Emergency Services   911  [facility] Services   311  Poison Control   (445) 238-8808  Suicide Prevention Lifeline   (494) 919-6748 (TALK)  Child Abuse Hotline   (238) 390-3504 (4-A-Child)  Sexual Assault Hotline   (426) 381-4052 (HOPE)  National Runaway Safeline   (718) 897-6963  (RUNAWAY)  All-Options Talkline   (890) 705-8279  Substance Abuse Referral   (119) 288-2886 (HELP)

## 2022-12-14 NOTE — COMMUNITY RESOURCES LIST (ENGLISH)
12/14/2022   [facility] - Outpatient Clinics  N/A  For questions about this resource list or additional care needs, please contact your primary care clinic or care manager.  Phone: [phone]   Email: N/A   Address: [address] [place] [address]   Hours: N/A        Mental Health       Individual counseling  1  Barbara KEISHA Seth Distance: 0.82 miles      Phone/Virtual   [address] [place] [address]  Language: English  Hours: Mon - Fri 8:00 AM - 5:00 PM  Fees: Insurance, Self Pay   Phone: (921) 691-4141 Email: [email or web address] Media Group     2  [facility] Distance: 1.55 miles      Phone/Virtual   [address] [place] [address]  Language: English, Hmong, Czech  Hours: Mon - Fri 9:00 AM - 6:00 PM  Fees: Insurance, Self Pay   Phone: (203) 366-2291 Email: info@[facility] HospitalWaveCheck. Website: https://www.[facility] HospitalGlio/[facility]/     Mental health support group  3  Emotions Anonymous International - Emotions Anonymous Distance: 2.79 miles      In-Person, Phone/Virtual   [address] [place] [address]  Language: English  Hours: Mon - Thu 12:00 PM - 5:00 PM  Fees: Free   Phone: (677) 445-1182 Email: info@emotionsEconic Technologies.org Website: http://Refined Investment Technologies.org/     4  RONALDO Crisis Resources Distance: 3.3 miles      In-Person, Phone/Virtual   [address] [facility] [address] [place] [address]  Language: English  Hours: Mon - Fri 8:00 AM - 5:00 PM  Fees: Free   Phone: (801) 899-6386 Email: liberty@[facility].org Website: http://www.namihelps.org/support/crisis-resources          Important Numbers & Websites       Emergency Services   911  [facility] Services   311  Poison Control   (189) 458-3500  Suicide Prevention Lifeline   (603) 696-6905 (TALK)  Child Abuse Hotline   (608) 517-3750 (4-A-Child)  Sexual Assault Hotline   (151) 562-1150 (HOPE)  National Runaway Safeline   (634) 328-1718  (RUNAWAY)  All-Options Talkline   (874) 858-7202  Substance Abuse Referral   (112) 846-1849 (HELP)

## 2022-12-14 NOTE — COMMUNITY RESOURCES LIST (PATIENT PREFERRED LANGUAGE)
12/14/2022   [facility] - Outpatient Clinics  N/A  ????????????????? ????????? ????????????????????????????????????????????????????????????????? ??????????? ????????????????????????????????? ????????? ????????????????????????? ??????????  Phone: [phone]   Email: N/A   Address: [facility] [place] [address]   Hours: N/A        ??????????????       ??? ?? ?????????????????  1  KEISHA Rose - ?????????? ?????????????????????? ????????: 0.82 ?????      ?????/???   [address] [place] [address]  ????????: ???????????  ????: ?????????? - ????????? 08:00 - 17:00  ???????: ??????????????????????, ?????   Phone: [phone] Email: [email or web address].Atox Bio     2  [facility] ????? [facility] St - ?????????? ?????????????????????? ????????: 1.55 ?????      ?????/???   [address] [facility] [place] [address]  ????????: ???, ?????, ???????????  ????: ?????????? - ????????? 09:00 - 18:00  ???????: ??????????????????????, ?????   Phone: [phone] Email: deidraNavigenics Website: [email or web address]://www.Zencoder./locations/life-medical-[place]/     ?????????????? ?????????????????  3  Emotions Mibuzz.tv International - ????????????????? ??????? ????????: 2.79 ?????      ????????????, ?????/???   [address] [place] [address]  ????????: ???????????  ????: ?????????? - ??????????? 12:00 - 17:00  ???????: ?????   Phone: [phone] [phone] Email: info@myLINGO.org Website: http://myLINGO.org/     4  RONALDO Crisis ?????????????? ????????: 3.3 ?????      ????????????, ?????/???   [address] [facility] [address] [place] [address]  ????????: ???????????  ????: ?????????? - ????????? 08:00 - 17:00  ???????: ?????   Phone: [phone] [phone] Email: liberty@[facility] Website: http://www.[facility].org/support/crisis-resources          ??????????????????????? ????????????       ???????????????????????   [address] [place] [address]    311  ??????????????????   [phone]  ???????????????????? Lifeline   (882) 716-8820 (TALK)  ?????????????????? Hotline   (364) 205-9210 (4-A-Child)  ??????????????????????????? hotline   (309) 132-1245 (HOPE)  ????????????????? Safeline   (155) 861-5535 (RUNAWAY)  All-Options Talkline   (572) 617-5376  ????????????????????????????????   (169) 863-1423 (HELP)

## 2022-12-14 NOTE — PROGRESS NOTES
Clinic Care Coordination Contact    Clinic Care Coordination Contact  OUTREACH    Referral Information:            Chief Complaint   Patient presents with     Clinic Care Coordination - Initial        Universal Utilization: appropriate     Utilization    Hospital Admissions  0             ED Visits  0             No Show Count (past year)  3                Current as of: 12/13/2022  7:10 PM              Clinical Concerns:  Current Medical Concerns:    Patient Active Problem List   Diagnosis     Diabetes mellitus, type 2 (H)     Primary osteoarthritis of left hip     Cardiomegaly     Keloid scar     SULTANA (obstructive sleep apnea)     Class 2 severe obesity due to excess calories with serious comorbidity and body mass index (BMI) of 35.0 to 35.9 in adult (H)         Current Behavioral Concerns: none    Education Provided to patient: CCC education, resources and support      Health Maintenance Reviewed:        Medication Management:  Medication review status: Medications reviewed and no changes reported per patient.             Functional Status: independent       Living Situation: with spouse and 6 yr old child, in apartment       Lifestyle & Psychosocial Needs:    Social Determinants of Health     Tobacco Use: Medium Risk     Smoking Tobacco Use: Former     Smokeless Tobacco Use: Never     Passive Exposure: Never   Alcohol Use: Not At Risk     Frequency of Alcohol Consumption: Never     Average Number of Drinks: Patient does not drink     Frequency of Binge Drinking: Never   Financial Resource Strain: Low Risk      Difficulty of Paying Living Expenses: Not very hard   Food Insecurity: No Food Insecurity     Worried About Running Out of Food in the Last Year: Never true     Ran Out of Food in the Last Year: Never true   Transportation Needs: No Transportation Needs     Lack of Transportation (Medical): No     Lack of Transportation (Non-Medical): No   Physical Activity: Insufficiently Active     Days of Exercise per  Week: 3 days     Minutes of Exercise per Session: 30 min   Stress: No Stress Concern Present     Feeling of Stress : Only a little   Social Connections: Not on file   Intimate Partner Violence: Not At Risk     Fear of Current or Ex-Partner: No     Emotionally Abused: No     Physically Abused: No     Sexually Abused: No   Depression: Not at risk     PHQ-2 Score: 0   Housing Stability: Low Risk      Unable to Pay for Housing in the Last Year: No     Number of Places Lived in the Last Year: 2     Unstable Housing in the Last Year: No          Resources and Interventions:  Current Resources: [facility]           Care Plan:  Care Plan: Social Security Card     Problem: Social Security Number issues     Priority: Medium Onset Date: 12/14/2022    Note:     Barriers: accessability  Strengths:motivated to resolve social security number issues  Patient expressed understanding of goal: yes  Action steps to achieve this goal:  1. I will go to the social security office   2. I will follow up as needed with social security  3. I will update CCSW as to my progress                Care Plan: Financial Wellbeing     Problem: Patient expresses financial resource strain     Goal: Create an action plan to increase financial stability     Start Date: 12/14/2022 Expected End Date: 12/14/2023    This Visit's Progress: 20%    Priority: High    Note:     Barriers:understanding complex systems  Strengths: motivated to seek assistance  Patient expressed understanding of goal: yes  Action steps to achieve this goal:  1. I will answer the phone when FRW calls  2. I will explain my situation to FRW  3. I will follow up with CCSW with any further questions or needs.                         Patient/Caregiver understanding: yes           Plan:CCSW contacted pt (Speaks English) pt declined . Pt stated his food support has gone form $600 to $100. Stated he has provided all requested verifications to the [facility]. Pt has attempted to  "reach someone at the county and has been unsuccessful. CCSW completed assessment, and pt stated he has no other needs. His spouse is working, and they are able to make ends meet. Pt stated he gets stressed out at times, he declined any mental health support.    Pt stated he is having problems with his social security number. Stated when he was trying to work, the employer would run his social security number to verify identity and to stated \"it's like I don't exist! There is nothing on me!\" CCSW advised pt to physically go to social security office and see what the problem is. Pt was agreeable.       ESTEPHANIA Medina, [facility]  Social Work Care Coordinator    "

## 2022-12-14 NOTE — COMMUNITY RESOURCES LIST (ENGLISH)
12/14/2022   [facility] - Outpatient Clinics  N/A  For questions about this resource list or additional care needs, please contact your primary care clinic or care manager.  Phone: [phone]   Email: N/A   Address: [address] [place] [address]   Hours: N/A        Mental Health       Individual counseling  1  Barbara KEISHA Seth Distance: 0.82 miles      Phone/Virtual   [address] [place] [address]  Language: English  Hours: Mon - Fri 8:00 AM - 5:00 PM  Fees: Insurance, Self Pay   Phone: (195) 873-2242 Email: [email or web address]     2  [facility] Distance: 1.55 miles      Phone/Virtual   [address] [place] [address]  Language: English, Hmong, Sudanese  Hours: Mon - Fri 9:00 AM - 6:00 PM  Fees: Insurance, Self Pay   Phone: (561) 650-8828 Email: info@[facility] HospitalArray Storm. Website: https://www.Regions HospitalCardiorobotics/[facility]/     Mental health support group  3  Emotions Anonymous International - Emotions Anonymous Distance: 2.79 miles      In-Person, Phone/Virtual   [address] [place] [address]  Language: English  Hours: Mon - Thu 12:00 PM - 5:00 PM  Fees: Free   Phone: (704) 893-2141 Email: info@emotionsNext Step Living.org Website: http://Pathagility.org/     4  RONALDO Crisis Resources Distance: 3.3 miles      In-Person, Phone/Virtual   [address] [facility] [address] [place] [address]  Language: English  Hours: Mon - Fri 8:00 AM - 5:00 PM  Fees: Free   Phone: (421) 335-6549 Email: liberty@[facility].org Website: http://www.namihelps.org/support/crisis-resources          Important Numbers & Websites       Emergency Services   911  [facility] Services   311  Poison Control   (534) 184-3380  Suicide Prevention Lifeline   (944) 788-8375 (TALK)  Child Abuse Hotline   (365) 219-9090 (4-A-Child)  Sexual Assault Hotline   (214) 988-4006 (HOPE)  National Runaway Safeline   (946) 493-9644  (RUNAWAY)  All-Options Talkline   (749) 297-2258  Substance Abuse Referral   (939) 528-1253 (HELP)

## 2022-12-14 NOTE — COMMUNITY RESOURCES LIST (ENGLISH)
12/14/2022   [facility] - Outpatient Clinics  N/A  For questions about this resource list or additional care needs, please contact your primary care clinic or care manager.  Phone: [phone]   Email: N/A   Address: [address] [place] [address]   Hours: N/A        Mental Health       Individual counseling  1  Barbara KEISHA Seth Distance: 0.82 miles      Phone/Virtual   [address] [place] [address]  Language: English  Hours: Mon - Fri 8:00 AM - 5:00 PM  Fees: Insurance, Self Pay   Phone: (705) 105-2430 Email: [email or web address] [facility] [place]     2  [facility] Distance: 1.55 miles      Phone/Virtual   [address] [place] [address]  Language: English, Hmong, Kyrgyz  Hours: Mon - Fri 9:00 AM - 6:00 PM  Fees: Insurance, Self Pay   Phone: (723) 246-8475 Email: info@[facility] CenterAllylix. Website: https://www.[facility] HospitalRally Fit/[facility]/     Mental health support group  3  Emotions Anonymous International - Emotions Anonymous Distance: 2.79 miles      In-Person, Phone/Virtual   [address] [place] [address]  Language: English  Hours: Mon - Thu 12:00 PM - 5:00 PM  Fees: Free   Phone: (130) 327-6086 Email: info@emotionsDolphin.org Website: http://AtheroNova.org/     4  RONALDO Crisis Resources Distance: 3.3 miles      In-Person, Phone/Virtual   [address] [facility] [address] [place] [address]  Language: English  Hours: Mon - Fri 8:00 AM - 5:00 PM  Fees: Free   Phone: (364) 884-4493 Email: liberty@[facility].org Website: http://www.namihelps.org/support/crisis-resources          Important Numbers & Websites       Emergency Services   911  [facility] Services   311  Poison Control   (950) 760-1951  Suicide Prevention Lifeline   (125) 738-8240 (TALK)  Child Abuse Hotline   (870) 891-5333 (4-A-Child)  Sexual Assault Hotline   (899) 547-7136 (HOPE)  National Runaway Safeline   (509) 481-6081  (RUNAWAY)  All-Options Talkline   (350) 666-6782  Substance Abuse Referral   (306) 233-3669 (HELP)

## 2022-12-14 NOTE — COMMUNITY RESOURCES LIST (PATIENT PREFERRED LANGUAGE)
12/14/2022   [facility] - Outpatient Clinics  N/A  ????????????????? ????????? ????????????????????????????????????????????????????????????????? ??????????? ????????????????????????????????? ????????? ????????????????????????? ??????????  Phone: [phone]   Email: N/A   Address: [address] [facility] [place] [address]   Hours: N/A        ??????????????       ??? ?? ?????????????????  1  KEISHA Rose - ?????????? ?????????????????????? ????????: 0.82 ?????      ?????/???   [address] [place] [address]  ????????: ???????????  ????: ?????????? - ????????? 08:00 - 17:00  ???????: ??????????????????????, ?????   Phone: [phone] Email: [email or web address]     2  [facility] ????? [facility] - St - ?????????? ?????????????????????? ????????: 1.55 ?????      ?????/???   [address] [facility] [place] [address]  ????????: ???, ?????, ???????????  ????: ?????????? - ????????? 09:00 - 18:00  ???????: ??????????????????????, ?????   Phone: [phone]) [phone] Email: deidraVolt Website: https://www.Nor1./locations/life-medical-[place]/     ?????????????? ?????????????????  3  Emotions Deck App [employer] International - ????????????????? ??????? ????????: 2.79 ?????      ????????????, ?????/???   [address] [place] [address]  ????????: ???????????  ????: ?????????? - ??????????? 12:00 - 17:00  ???????: ?????   Phone: (190) [phone] Email: info@Needle HR.org Website: http://Needle HR.org/     4  RONALDO Crisis ?????????????? ????????: 3.3 ?????      ????????????, ?????/???   [address] [facility] [address] [place] [address]  ????????: ???????????  ????: ?????????? - ????????? 08:00 - 17:00  ???????: ?????   Phone: ([phone] [phone] Email: liberty@[facility] Website: http://www.[facility].org/support/crisis-resources          ??????????????????????? ????????????       ???????????????????????   [address] [place] [address]    311  ??????????????????   [phone]  ???????????????????? Lifeline   (298) 193-4210 (TALK)  ?????????????????? Hotline   (166) 749-7519 (4-A-Child)  ??????????????????????????? hotline   (735) 382-4160 (HOPE)  ????????????????? Safeline   (589) 496-7559 (RUNAWAY)  All-Options Talkline   (161) 371-3198  ????????????????????????????????   ([phone] [phone] (HELP)

## 2022-12-15 ENCOUNTER — PATIENT OUTREACH (OUTPATIENT)
Dept: CARE COORDINATION | Facility: CLINIC | Age: 42
End: 2022-12-15

## 2022-12-15 ENCOUNTER — TELEPHONE (OUTPATIENT)
Dept: FAMILY MEDICINE | Facility: CLINIC | Age: 42
End: 2022-12-15

## 2022-12-15 NOTE — TELEPHONE ENCOUNTER
An email received from patient requesting provider to authorize a medical clearance for patient to participate in the  Training Course.  Form printed and in Dr. Vela's Blue Folder partially completed. Needs provider signature.    KENTON RainesN, RN  [facility]

## 2022-12-15 NOTE — PROGRESS NOTES
Clinic Care Coordination Contact  Program:  County:  [facility] Case #:  [facility] Worker:   Shanekaure #:   Subscriber #:   Renewal:  Date Applied:     FRW Outreach:   12/15/2022  FRW called patient and left a vm with call back information. FRW will make outreach next week    Health Insurance:      Referral/Screening:  W Screening    Row Name 12/12/22 1451       [facility] Benefits   Is patient requesting help applying for [facility] benefits? Yes       Have you recently applied for any [facility] benefits? Yes       What was applied for?  SNAP;CASH;Energy Assistance;Other       Application date: November 2022       How many people in your household? 3       Do you buy/eat food together? Yes       What is the monthly gross income for the household (wages, social security, workers comp, and pension)?  2000       Insurance:   [place]? No       Is this an insurance renewal? No       Is this a new insurance application request? No       OTHER   Is this a denise care application? No

## 2022-12-16 NOTE — TELEPHONE ENCOUNTER
Dr. Vela,     Pt called back just now and needs the medical clearance for the  program ready by early next week so they can add him to the program.      See note from Jairo below.  Thanks.     EMILIO Hanson TC  12/16/22  At 11 am

## 2022-12-20 ENCOUNTER — TELEPHONE (OUTPATIENT)
Dept: FAMILY MEDICINE | Facility: CLINIC | Age: 42
End: 2022-12-20

## 2022-12-20 DIAGNOSIS — M16.12 PRIMARY OSTEOARTHRITIS OF LEFT HIP: Primary | ICD-10-CM

## 2022-12-20 NOTE — TELEPHONE ENCOUNTER
Please call [facility] ortho to see what happened with scheduling an appt (referral placed previously by Dr. Vela but never scheduled). I placed a new referral as well if needed.

## 2022-12-21 NOTE — TELEPHONE ENCOUNTER
Called pt to assist in scheduling ortho appt but could only leave VM.  Called the ortho scheduling and pt never called for an appt.  1st attempt to contact pt. thanks

## 2022-12-22 NOTE — TELEPHONE ENCOUNTER
DIAGNOSIS: chronic left hip pain, previous surgery, interested in IVONNE  Please use phone or ipad to reach  [phone] option 0   APPOINTMENT DATE: 12/23/2022   NOTES STATUS DETAILS   OFFICE NOTE from referring provider Internal 12/12/2022 - Sierra Fabian MD - [facility]     OFFICE NOTE from other specialist Internal  Media Tab 07/26/2022 - Joe Fraire MD - [facility]    07/08/2022 - Noe Salmon MD - [facility]    06/29/2022, 04/20/2022 -  Dr Hanny Vela, [facility]    05/20/2022, 04/29/2022 - Colette Smith MD - [place]    04/29/2022 - Ric Carrizales PA-C - [facility] [facility]   MEDICATION LIST Internal/Care Everywhere    LABS     CBC/DIFF Internal 04/14/2022   INJECTIONS PACS 05/20/2022   MRI PACS 06/07/2022, 05/03/2022 - LT Hip  05/20/2022 - LT Hip Arthrogram   XRAYS (IMAGES & REPORTS) PACS 04/29/2022 - Bilateral Hip

## 2022-12-22 NOTE — TELEPHONE ENCOUNTER
Pt called back and is scheduled with [facility] ortho Dr. Dann Orozco tomorrow at 140 pm at [address] [facility].  Thanks.

## 2022-12-23 ENCOUNTER — TELEPHONE (OUTPATIENT)
Dept: ORTHOPEDICS | Facility: CLINIC | Age: 42
End: 2022-12-23

## 2022-12-23 ENCOUNTER — LAB (OUTPATIENT)
Dept: LAB | Facility: CLINIC | Age: 42
End: 2022-12-23
Payer: COMMERCIAL

## 2022-12-23 ENCOUNTER — OFFICE VISIT (OUTPATIENT)
Dept: ORTHOPEDICS | Facility: CLINIC | Age: 42
End: 2022-12-23
Payer: COMMERCIAL

## 2022-12-23 ENCOUNTER — PRE VISIT (OUTPATIENT)
Dept: ORTHOPEDICS | Facility: CLINIC | Age: 42
End: 2022-12-23

## 2022-12-23 VITALS — HEIGHT: 70 IN | BODY MASS INDEX: 30.97 KG/M2 | WEIGHT: 216.3 LBS

## 2022-12-23 DIAGNOSIS — M16.12 PRIMARY OSTEOARTHRITIS OF LEFT HIP: ICD-10-CM

## 2022-12-23 DIAGNOSIS — M16.12 PRIMARY OSTEOARTHRITIS OF LEFT HIP: Primary | ICD-10-CM

## 2022-12-23 DIAGNOSIS — E55.9 HYPOVITAMINOSIS D: ICD-10-CM

## 2022-12-23 LAB
CRP SERPL-MCNC: 9.08 MG/L
D DIMER PPP FEU-MCNC: 0.28 UG/ML FEU (ref 0–0.5)
ERYTHROCYTE [SEDIMENTATION RATE] IN BLOOD BY WESTERGREN METHOD: 18 MM/HR (ref 0–15)
URATE SERPL-MCNC: 8.1 MG/DL (ref 3.4–7)

## 2022-12-23 PROCEDURE — 82306 VITAMIN D 25 HYDROXY: CPT | Performed by: ORTHOPAEDIC SURGERY

## 2022-12-23 PROCEDURE — 85379 FIBRIN DEGRADATION QUANT: CPT | Performed by: ORTHOPAEDIC SURGERY

## 2022-12-23 PROCEDURE — 86038 ANTINUCLEAR ANTIBODIES: CPT | Performed by: ORTHOPAEDIC SURGERY

## 2022-12-23 PROCEDURE — 86200 CCP ANTIBODY: CPT | Performed by: ORTHOPAEDIC SURGERY

## 2022-12-23 PROCEDURE — 86140 C-REACTIVE PROTEIN: CPT | Performed by: PATHOLOGY

## 2022-12-23 PROCEDURE — 36415 COLL VENOUS BLD VENIPUNCTURE: CPT | Performed by: PATHOLOGY

## 2022-12-23 PROCEDURE — 86431 RHEUMATOID FACTOR QUANT: CPT | Performed by: ORTHOPAEDIC SURGERY

## 2022-12-23 PROCEDURE — 85652 RBC SED RATE AUTOMATED: CPT | Performed by: PATHOLOGY

## 2022-12-23 PROCEDURE — 99213 OFFICE O/P EST LOW 20 MIN: CPT | Mod: GC | Performed by: ORTHOPAEDIC SURGERY

## 2022-12-23 PROCEDURE — 86225 DNA ANTIBODY NATIVE: CPT | Performed by: ORTHOPAEDIC SURGERY

## 2022-12-23 PROCEDURE — 84550 ASSAY OF BLOOD/URIC ACID: CPT | Performed by: ORTHOPAEDIC SURGERY
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
## 2022-12-23 NOTE — TELEPHONE ENCOUNTER
ATC called patient to discuss moving appointment to earlier date, closer to labs and CT scan that was ordered and scheduled.  Patient was amenable to this plan and said they would accept the earlier appointment time/date.  ATC made the change.    Dann Mittal, ATC

## 2022-12-23 NOTE — NURSING NOTE
"Reason For Visit:   Chief Complaint   Patient presents with     Consult     Left hip pain       Ht 1.77 m (5' 9.69\")   Wt 98.1 kg (216 lb 4.8 oz)   BMI 31.32 kg/m      Pain Assessment  Patient Currently in Pain: No (Left hip pain with walking; no pain while sitting)    MIREYA Crawford    "

## 2022-12-24 LAB — DEPRECATED CALCIDIOL+CALCIFEROL SERPL-MC: 19 UG/L (ref 20–75)

## 2022-12-26 LAB
ANA PAT SER IF-IMP: ABNORMAL
ANA SER QL IF: ABNORMAL
ANA TITR SER IF: ABNORMAL {TITER}
CCP AB SER IA-ACNC: 1.4 U/ML
DSDNA AB SER-ACNC: 1 IU/ML
RHEUMATOID FACT SER NEPH-ACNC: 7 IU/ML

## 2022-12-27 ENCOUNTER — ANCILLARY PROCEDURE (OUTPATIENT)
Dept: CT IMAGING | Facility: CLINIC | Age: 42
End: 2022-12-27
Attending: ORTHOPAEDIC SURGERY
Payer: COMMERCIAL

## 2022-12-27 DIAGNOSIS — M16.12 PRIMARY OSTEOARTHRITIS OF LEFT HIP: ICD-10-CM

## 2022-12-27 PROCEDURE — 73700 CT LOWER EXTREMITY W/O DYE: CPT | Mod: LT | Performed by: RADIOLOGY

## 2022-12-29 ENCOUNTER — PATIENT OUTREACH (OUTPATIENT)
Dept: CARE COORDINATION | Facility: CLINIC | Age: 42
End: 2022-12-29

## 2022-12-29 NOTE — PROGRESS NOTES
Clinic Care Coordination Contact  Program:  County:  [facility] Case #:  [facility] Worker:   Mnsure #:   Subscriber #:   Renewal:  Date Applied:     FRW Outreach:   12/29/2022 FRW called patient and left a final vm with call back information as attempt x2 with no answer or return phone calls. FRW closed the FRW program and remove patient from panel. Patient can be referred back to FRW if needed.        12/15/2022  FRW called patient and left a vm with call back information. FRW will make outreach next week    Health Insurance:      Referral/Screening:  [facility] Screening    Row Name 12/12/22 1457       [facility] Benefits   Is patient requesting help applying for [facility] benefits? Yes       Have you recently applied for any [facility] benefits? Yes       What was applied for?  SNAP;CASH;Energy Assistance;Other       Application date: November 2022       How many people in your household? 3       Do you buy/eat food together? Yes       What is the monthly gross income for the household (wages, social security, workers comp, and pension)?  2000       Insurance:   Was [place]? No       Is this an insurance renewal? No       Is this a new insurance application request? No       OTHER   Is this a denise care application? No

## 2023-01-06 ENCOUNTER — LAB (OUTPATIENT)
Dept: LAB | Facility: CLINIC | Age: 43
End: 2023-01-06
Payer: COMMERCIAL

## 2023-01-06 ENCOUNTER — MYC MEDICAL ADVICE (OUTPATIENT)
Dept: ORTHOPEDICS | Facility: CLINIC | Age: 43
End: 2023-01-06

## 2023-01-06 ENCOUNTER — OFFICE VISIT (OUTPATIENT)
Dept: ORTHOPEDICS | Facility: CLINIC | Age: 43
End: 2023-01-06
Payer: COMMERCIAL

## 2023-01-06 DIAGNOSIS — E55.9 HYPOVITAMINOSIS D: Primary | ICD-10-CM

## 2023-01-06 DIAGNOSIS — M16.12 PRIMARY OSTEOARTHRITIS OF LEFT HIP: ICD-10-CM

## 2023-01-06 DIAGNOSIS — E55.9 HYPOVITAMINOSIS D: ICD-10-CM

## 2023-01-06 LAB — URATE SERPL-MCNC: 7 MG/DL (ref 3.4–7)

## 2023-01-06 PROCEDURE — 36415 COLL VENOUS BLD VENIPUNCTURE: CPT | Performed by: PATHOLOGY

## 2023-01-06 PROCEDURE — 84550 ASSAY OF BLOOD/URIC ACID: CPT | Performed by: ORTHOPAEDIC SURGERY

## 2023-01-06 PROCEDURE — 99213 OFFICE O/P EST LOW 20 MIN: CPT | Performed by: ORTHOPAEDIC SURGERY

## 2023-01-06 RX ORDER — ALLOPURINOL 300 MG/1
300 TABLET ORAL DAILY
Qty: 90 TABLET | Refills: 3 | Status: SHIPPED | OUTPATIENT
Start: 2023-01-06 | End: 2024-03-05
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
## 2023-01-06 NOTE — NURSING NOTE
Reason For Visit:   Chief Complaint   Patient presents with     RECHECK     CT/lab results        There were no vitals taken for this visit.    Pain Assessment  Patient Currently in Pain: Yes  Patient's Stated Pain Goal: 5 (pain ranges)    Monie Loomis

## 2023-01-09 ENCOUNTER — TELEPHONE (OUTPATIENT)
Dept: ORTHOPEDICS | Facility: CLINIC | Age: 43
End: 2023-01-09

## 2023-01-10 ENCOUNTER — TELEPHONE (OUTPATIENT)
Dept: ORTHOPEDICS | Facility: CLINIC | Age: 43
End: 2023-01-10

## 2023-01-10 NOTE — TELEPHONE ENCOUNTER
----- Message from Noe Salmon MD sent at 1/9/2023  5:40 PM CST -----  Regarding: RE: Who should do this patients Biopsy?  Can discuss in a virtual visit. It is a left femur biopsy.   ----- Message -----  From: Sherrell Osorio RN  Sent: 1/9/2023  11:57 AM CST  To: Noe Salmon MD, #  Subject: FW: Who should do this patients Biopsy?          Hello.  When would you like to see this patient to discuss biopsy?  Or just schedule the biopsy?  Please advise.      April   ----- Message -----  From: Monie Loomis  Sent: 1/6/2023   9:59 AM CST  To: Sherrell Osorio RN, Noe Salmon MD  Subject: Who should do this patients Biopsy?              Hi,     I work with Dr. Orozco and he thinks that this patient needs a biopsy of his left proximal humerus either for infection or tumor, he was wondering if he should do the biopsy or Dr. Salmon should do the biopsy? This patient has seen Dr. Salmon previously.     Please let us know     Thanks     Candice

## 2023-01-10 NOTE — TELEPHONE ENCOUNTER
Attempted to contact patient via interpretive services.  Left message for patient to return my call.  Patient needs an appointment with Dr. Salmon to discuss biopsy.

## 2023-01-11 NOTE — TELEPHONE ENCOUNTER
Contacted patient via interpretive services.  Advised patient that Dr. Orozco wanted him to follow up with Dr. Salmon for his left leg.  Appointment was made for patient to see him next Wednesday, 1/18/23 at 845 am.  Patient also spoke English so the appointment was not scheduled via the .  Patient had no other questions.

## 2023-01-13 ENCOUNTER — DOCUMENTATION ONLY (OUTPATIENT)
Dept: ORTHOPEDICS | Facility: CLINIC | Age: 43
End: 2023-01-13

## 2023-01-13 NOTE — PROGRESS NOTES
Received Completed forms Yes   Faxed Forms Faxed To: [facility]  Fax Number: [phone]   Sent to [facility] (Date) 1/13/23

## 2023-01-16 ENCOUNTER — PATIENT OUTREACH (OUTPATIENT)
Dept: CARE COORDINATION | Facility: CLINIC | Age: 43
End: 2023-01-16

## 2023-01-16 NOTE — PROGRESS NOTES
Clinic Care Coordination Contact    Community Health Worker Follow Up    Care Gaps:   Health Maintenance Due   Topic Date Due     YEARLY PREVENTIVE VISIT  Never done     ADVANCE CARE PLANNING  Never done     PHQ-2 (once per calendar year)  01/01/2023     Scheduled on 3/03/2023 for preventive care      Care Plan:   Care Plan: Social Security Card     Problem: Social Security Number issues     Priority: Medium Onset Date: 12/14/2022    Note:     Barriers: accessability  Strengths:motivated to resolve social security number issues  Patient expressed understanding of goal: yes  Action steps to achieve this goal:  1. I will go to the social security office   2. I will follow up as needed with social security  3. I will update CCSW as to my progress                Care Plan: Financial Wellbeing     Problem: Patient expresses financial resource strain     Goal: Create an action plan to increase financial stability     Start Date: 12/14/2022 Expected End Date: 12/14/2023    This Visit's Progress: 30% Recent Progress: 20%    Priority: High    Note:     Barriers:understanding complex systems  Strengths: motivated to seek assistance  Patient expressed understanding of goal: yes    Action steps to achieve this goal:  1. I will answer the phone when FRW calls return call at timely manner.   2. I will explain my situation to FRW  3. I will follow up with CCSW with any further questions or needs.     Goal update: 1/16/2023                    Intervention and Education during outreach:   -Patient called back after CHW left a voice message.   -Patient stated he still needs helps to apply for [facility] benefits for SNAP, cash/rent and .   -Patient stated he's experiencing hardship and difficulties regarding living situations and phone communications.   -Patient stated that [facility] has closed his case and had to restart and reapply from the start again.  -CHW conducted another FRW screening and referred to FRW to  assist with [facility] assistance and benefits.   -Per patient agreed, CHW assisted with preventive care visit with PCP in March 2023.   -Patient was informed to call sooner with questions or concerns.     CHW Next Outreach: In one month.     Financial Resource Worker Screening    [facility] Benefits  Is patient requesting help applying for [facility] benefits?: Yes  Have you recently applied for any [facility] benefits?: Yes  What was applied for? : SNAP  Application date:: December 2022.  How many people in your household?: 3  Do you buy/eat food together?: Yes  What is the monthly gross income for the household (wages, social security, workers comp, and pension)? : 1800    Ins[place]:  [place] No  Is this an insurance renewal?: No  Is this a new insurance application request?: No    Any other information for the FRW?: Please call spouse's contact at [phone] after 2 PM if unable to reach patient.    Care Coordination team will tell patient:   Thank you for answering all the questions, based on screening questions, our Financial Resource Worker will reach out to you with additional questions and next steps.

## 2023-01-16 NOTE — PROGRESS NOTES
Clinic Care Coordination Contact  [facility]/Voicemail    Clinical Data: Care Coordinator Outreach  Outreach attempted x 1. CHW attempted to call and left message on patient's voicemail both in Chilean and English with call back information and requested return call.    Plan: Care Coordinator will try to reach patient again in two weeks.    Giovani Green - Community Health Worker (CHW)  [facility]  [phone]

## 2023-01-17 ENCOUNTER — HOSPITAL ENCOUNTER (OUTPATIENT)
Dept: CT IMAGING | Facility: CLINIC | Age: 43
Discharge: HOME | End: 2023-01-17
Attending: ORTHOPAEDIC SURGERY | Admitting: ORTHOPAEDIC SURGERY
Payer: COMMERCIAL

## 2023-01-17 PROCEDURE — 73700 CT LOWER EXTREMITY W/O DYE: CPT | Mod: 26 | Performed by: RADIOLOGY

## 2023-01-17 PROCEDURE — 73700 CT LOWER EXTREMITY W/O DYE: CPT | Mod: LT

## 2023-01-18 ENCOUNTER — OFFICE VISIT (OUTPATIENT)
Dept: ORTHOPEDICS | Facility: CLINIC | Age: 43
End: 2023-01-18
Payer: COMMERCIAL

## 2023-01-18 DIAGNOSIS — M89.9 BONE LESION: Primary | ICD-10-CM

## 2023-01-18 PROCEDURE — 99213 OFFICE O/P EST LOW 20 MIN: CPT | Performed by: ORTHOPAEDIC SURGERY
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
## 2023-01-18 NOTE — NURSING NOTE
Reason For Visit:   Chief Complaint   Patient presents with     RECHECK     Followup left hip // discuss treatment plan        There were no vitals taken for this visit.    Pain Assessment  Patient Currently in Pain: Yes  0-10 Pain Scale: 3 (can be painful with movements and walking)      Remi Watson ATC

## 2023-01-19 ENCOUNTER — PATIENT OUTREACH (OUTPATIENT)
Dept: CARE COORDINATION | Facility: CLINIC | Age: 43
End: 2023-01-19
Payer: COMMERCIAL

## 2023-01-19 NOTE — PROGRESS NOTES
Clinic Care Coordination Contact  Program: [facility]  County: [facility] Case #:  [facility] Worker:   Willard #:   Subscriber #:   Renewal:  Date Applied:     ANDRE Outreach:   1/19/2023 FRW called and the patient stated that if we call before 2pm we will need an  but after 2 no  needed. Patient stated that he applied in the middle of December for SNAP and FRW explained to him that [facility] is behind and we need to wait two more weeks to follow up on the application.     Health Insurance:      Referral/Screening:  FRW Screening    Row Name 01/16/23 1105       [facility] Benefits   Is patient requesting help applying for [facility] benefits? Yes       Have you recently applied for any [facility] benefits? Yes       What was applied for?  SNAP       Application date: December 2022.       How many people in your household? 3       Do you buy/eat food together? Yes       What is the monthly gross income for the household (wages, social security, workers comp, and pension)?  1800       Ins[place]:   [place]? No       Is this an insurance renewal? No       Is this a new insurance application request? No       OTHER   Is this a denise care application? No       Any other information for the FRW? Please call spouse's contact at [phone] after 2 PM if unable to reach patient.

## 2023-01-25 ENCOUNTER — PATIENT OUTREACH (OUTPATIENT)
Dept: CARE COORDINATION | Facility: CLINIC | Age: 43
End: 2023-01-25
Payer: COMMERCIAL

## 2023-01-25 NOTE — PROGRESS NOTES
Clinic Care Coordination Contact  Care Coordination Clinician Chart Review    Situation: Patient chart reviewed by Care Coordinator.       Background: Care Coordination Program started: 12/12/2022. Initial assessment completed and patient-centered care plan(s) were developed with participation from patient. Lead CC handed patient off to CHW for continued outreaches.       Assessment: Per chart review, patient outreach completed by CC CHW on 1/16/2023.  Patient is actively working to accomplish goal(s). Patient's goal(s) appropriate and relevant at this time. Patient is not due for updated Plan of Care.  Assessments will be completed annually or as needed/with change of patient status.      Care Plan: Social Security Card     Problem: Social Security Number issues     Priority: Medium Onset Date: 12/14/2022    Note:     Barriers: accessability  Strengths:motivated to resolve social security number issues  Patient expressed understanding of goal: yes  Action steps to achieve this goal:  1. I will go to the social security office   2. I will follow up as needed with social security  3. I will update CCSW as to my progress                Care Plan: Financial Wellbeing     Problem: Patient expresses financial resource strain     Goal: Create an action plan to increase financial stability     Start Date: 12/14/2022 Expected End Date: 12/14/2023    This Visit's Progress: 30% Recent Progress: 20%    Priority: High    Note:     Barriers:understanding complex systems  Strengths: motivated to seek assistance  Patient expressed understanding of goal: yes    Action steps to achieve this goal:  1. I will answer the phone when FRW calls return call at timely manner.   2. I will explain my situation to FRW  3. I will follow up with CCSW with any further questions or needs.     Goal update: 1/16/2023                           Plan/Recommendations: The patient will continue working with Care Coordination to achieve goal(s) as above.  CHW will continue outreaches at minimum every 30 days and will involve Lead CC as needed or if patient is ready to move to Maintenance. Lead CC will continue to monitor CHW outreaches and patient's progress to goal(s) every 6 weeks.     Plan of Care updated and sent to patient: CLARI Sheehan  Primary Care Clinic- Social Work Care Coordinator  [facility] [place]  Ph: [phone]  1/25/2023 2:23 PM

## 2023-02-10 ENCOUNTER — OFFICE VISIT (OUTPATIENT)
Dept: ORTHOPEDICS | Facility: CLINIC | Age: 43
End: 2023-02-10
Payer: COMMERCIAL

## 2023-02-10 DIAGNOSIS — M16.12 PRIMARY OSTEOARTHRITIS OF LEFT HIP: Primary | ICD-10-CM

## 2023-02-10 PROCEDURE — 99214 OFFICE O/P EST MOD 30 MIN: CPT | Performed by: ORTHOPAEDIC SURGERY
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
## 2023-02-10 NOTE — PROGRESS NOTES
Reason For Visit:   Chief Complaint   Patient presents with     RECHECK     Patient returns to follow-up on left hip.  CT on 1/17/23.  Seen by Dr. Salmon on 1/18/23.         There were no vitals taken for this visit.    Pain Assessment  Patient Currently in Pain: Yes  0-10 Pain Scale: 7  Primary Pain Location: Hip (Left hip)    Dann Mittal, ATC
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
## 2023-02-10 NOTE — PROGRESS NOTES
Teaching Flowsheet   Relevant Diagnosis: L hip osteoarthritis  Teaching Topic: Perioperative teaching     RN Note: Pt is calm and cooperative, asking questions appropriately. Declined use of  for perioperative teaching. Pt was instructed on surgical packet requirements including H&P, NPO, and pre-surgical scrub. Pt verbalized understanding. Pt will schedule H&P c PCP . AAOS, total joint booklet, surgery packet and scrub given to pt. Patient instructed to sign up for joint replacement class.    Person(s) involved in teaching:   Patient     Motivation Level:  Asks Questions: Yes  Eager to Learn: Yes  Cooperative: Yes  Receptive (willing/able to accept information): Yes  Any cultural factors/Mandaeism beliefs that may influence understanding or compliance? No     Patient demonstrates understanding of the following:  Reason for the appointment, diagnosis and treatment plan: Yes  Knowledge of proper use of medications and conditions for which they are ordered (with special attention to potential side effects or drug interactions): Yes  Which situations necessitate calling provider and whom to contact: Yes    Proper use and care of (medical equip, care aids, etc.): Yes  Nutritional needs and diet plan: Yes  Pain management techniques: Yes  Wound Care: Yes  How and/when to access community resources: Yes    Tara Holter, RNCC

## 2023-02-13 RX ORDER — CEFAZOLIN SODIUM 2 G/50ML
2 SOLUTION INTRAVENOUS
Status: CANCELLED | OUTPATIENT
Start: 2023-02-13

## 2023-02-13 RX ORDER — CEFAZOLIN SODIUM 2 G/50ML
2 SOLUTION INTRAVENOUS SEE ADMIN INSTRUCTIONS
Status: CANCELLED | OUTPATIENT
Start: 2023-02-13

## 2023-02-13 RX ORDER — TRANEXAMIC ACID 650 MG/1
1950 TABLET ORAL ONCE
Status: CANCELLED | OUTPATIENT
Start: 2023-02-13 | End: 2023-02-13

## 2023-02-16 ENCOUNTER — TELEPHONE (OUTPATIENT)
Dept: ORTHOPEDICS | Facility: CLINIC | Age: 43
End: 2023-02-16
Payer: COMMERCIAL

## 2023-02-16 NOTE — TELEPHONE ENCOUNTER
Attempted to reach patient to schedule surgery for left IVONNE.      Left message for patient to call the surgery scheduling line at [phone].     Sharla Mercedes, Surgery Scheduler

## 2023-02-22 ENCOUNTER — PATIENT OUTREACH (OUTPATIENT)
Dept: CARE COORDINATION | Facility: CLINIC | Age: 43
End: 2023-02-22
Payer: COMMERCIAL

## 2023-02-22 NOTE — PROGRESS NOTES
Clinic Care Coordination Contact    Community Health Worker Follow Up    Care Gaps:   Health Maintenance Due   Topic Date Due     YEARLY PREVENTIVE VISIT  Never done     ADVANCE CARE PLANNING  Never done     Scheduled on 3/03/2023 for preventive care visit with PCP.       Care Plan:   Care Plan: Social Security Card     Problem: Social Security Number issues     Priority: Medium Onset Date: 12/14/2022    Note:     Barriers: accessability  Strengths:motivated to resolve social security number issues  Patient expressed understanding of goal: yes  Action steps to achieve this goal:  1. I will go to the social security office   2. I will follow up as needed with social security  3. I will update CCSW as to my progress                Care Plan: Financial Wellbeing     Problem: Patient expresses financial resource strain     Goal: Create an action plan to increase financial stability     Start Date: 12/14/2022 Expected End Date: 12/14/2023    This Visit's Progress: 40% Recent Progress: 30%    Priority: High    Note:     Barriers:understanding complex systems  Strengths: motivated to seek assistance  Patient expressed understanding of goal: yes    Action steps to achieve this goal:  1. I will answer the phone when [facility] calls for interview on 3/07/2023 between 7:30 AM to 12:30 PM.    2. I will explain my situation to FRW and answer any questions.   3. I will update CCC team at outreach.     Goal update: 2/22/2023                    Intervention and Education during outreach:  -CHW reminded patient of upcoming yearly preventive care visit with PCP.  -Patient has all medications and able to get refills when needed.  -Patient remembers all upcoming appointments and does not need ride.   -Patient received letter from [facility] that he'll receive a call for interview on 3/07/2023 between 7:30 AM to 12:30 PM.  -Patient was giver food shelves resources if needed.   -No additional coordination assistance needed at this  time.   -Patient was informed to call sooner with questions or concerns.     CHW Next Outreach: In one month.

## 2023-03-01 NOTE — TELEPHONE ENCOUNTER
Per jasmyne, patient has dental cleaning on 3/15/23.    Attempted to reach patient to discuss surgery date options.  Left message with wife.       Sharla Mercedes, Surgery Scheduler

## 2023-03-03 ENCOUNTER — TELEPHONE (OUTPATIENT)
Dept: FAMILY MEDICINE | Facility: CLINIC | Age: 43
End: 2023-03-03

## 2023-03-03 ENCOUNTER — OFFICE VISIT (OUTPATIENT)
Dept: FAMILY MEDICINE | Facility: CLINIC | Age: 43
End: 2023-03-03
Payer: COMMERCIAL

## 2023-03-03 VITALS
WEIGHT: 213.75 LBS | HEART RATE: 93 BPM | BODY MASS INDEX: 30.6 KG/M2 | DIASTOLIC BLOOD PRESSURE: 70 MMHG | RESPIRATION RATE: 16 BRPM | TEMPERATURE: 97.8 F | SYSTOLIC BLOOD PRESSURE: 106 MMHG | OXYGEN SATURATION: 96 % | HEIGHT: 70 IN

## 2023-03-03 DIAGNOSIS — I51.7 CARDIOMEGALY: ICD-10-CM

## 2023-03-03 DIAGNOSIS — G47.33 OSA (OBSTRUCTIVE SLEEP APNEA): ICD-10-CM

## 2023-03-03 DIAGNOSIS — N18.30 TYPE 2 DIABETES MELLITUS WITH STAGE 3 CHRONIC KIDNEY DISEASE, WITHOUT LONG-TERM CURRENT USE OF INSULIN, UNSPECIFIED WHETHER STAGE 3A OR 3B CKD (H): ICD-10-CM

## 2023-03-03 DIAGNOSIS — E11.22 TYPE 2 DIABETES MELLITUS WITH STAGE 3 CHRONIC KIDNEY DISEASE, WITHOUT LONG-TERM CURRENT USE OF INSULIN, UNSPECIFIED WHETHER STAGE 3A OR 3B CKD (H): ICD-10-CM

## 2023-03-03 DIAGNOSIS — E11.9 TYPE 2 DIABETES MELLITUS WITHOUT COMPLICATION, WITHOUT LONG-TERM CURRENT USE OF INSULIN (H): ICD-10-CM

## 2023-03-03 DIAGNOSIS — M1A.09X0 IDIOPATHIC CHRONIC GOUT OF MULTIPLE SITES WITHOUT TOPHUS: ICD-10-CM

## 2023-03-03 DIAGNOSIS — Z00.00 PREVENTATIVE HEALTH CARE: Primary | ICD-10-CM

## 2023-03-03 DIAGNOSIS — R94.31 ABNORMAL ELECTROCARDIOGRAM: ICD-10-CM

## 2023-03-03 DIAGNOSIS — M16.12 PRIMARY OSTEOARTHRITIS OF LEFT HIP: ICD-10-CM

## 2023-03-03 DIAGNOSIS — I44.4 LAFB (LEFT ANTERIOR FASCICULAR BLOCK): ICD-10-CM

## 2023-03-03 PROBLEM — E66.812 CLASS 2 SEVERE OBESITY DUE TO EXCESS CALORIES WITH SERIOUS COMORBIDITY AND BODY MASS INDEX (BMI) OF 35.0 TO 35.9 IN ADULT (H): Status: RESOLVED | Noted: 2022-04-20 | Resolved: 2023-03-03

## 2023-03-03 PROBLEM — E66.01 CLASS 2 SEVERE OBESITY DUE TO EXCESS CALORIES WITH SERIOUS COMORBIDITY AND BODY MASS INDEX (BMI) OF 35.0 TO 35.9 IN ADULT (H): Status: RESOLVED | Noted: 2022-04-20 | Resolved: 2023-03-03

## 2023-03-03 PROCEDURE — 93010 ELECTROCARDIOGRAM REPORT: CPT | Performed by: GENERAL ACUTE CARE HOSPITAL

## 2023-03-03 PROCEDURE — 93005 ELECTROCARDIOGRAM TRACING: CPT | Performed by: FAMILY MEDICINE

## 2023-03-03 PROCEDURE — 99214 OFFICE O/P EST MOD 30 MIN: CPT | Mod: 25 | Performed by: FAMILY MEDICINE

## 2023-03-03 PROCEDURE — 99396 PREV VISIT EST AGE 40-64: CPT | Mod: 25 | Performed by: FAMILY MEDICINE

## 2023-03-03 ASSESSMENT — ENCOUNTER SYMPTOMS
NAUSEA: 0
DIARRHEA: 0
HEARTBURN: 0
DIZZINESS: 0
CHILLS: 0
FEVER: 0
SORE THROAT: 1
HEADACHES: 0
EYE PAIN: 0
PALPITATIONS: 0
WEAKNESS: 0
ABDOMINAL PAIN: 0
SHORTNESS OF BREATH: 0
ARTHRALGIAS: 1
HEMATOCHEZIA: 0
COUGH: 1
HEMATURIA: 0
CONSTIPATION: 0
DYSURIA: 0
MYALGIAS: 0
PARESTHESIAS: 0
NERVOUS/ANXIOUS: 0
JOINT SWELLING: 0
FREQUENCY: 0

## 2023-03-03 ASSESSMENT — PATIENT HEALTH QUESTIONNAIRE - PHQ9
SUM OF ALL RESPONSES TO PHQ QUESTIONS 1-9: 12
SUM OF ALL RESPONSES TO PHQ QUESTIONS 1-9: 12
10. IF YOU CHECKED OFF ANY PROBLEMS, HOW DIFFICULT HAVE THESE PROBLEMS MADE IT FOR YOU TO DO YOUR WORK, TAKE CARE OF THINGS AT HOME, OR GET ALONG WITH OTHER PEOPLE: SOMEWHAT DIFFICULT
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
## 2023-03-04 NOTE — TELEPHONE ENCOUNTER
"Please call Thet Vinod Senior.  Let him know his EKG test showed some new things on his heart. Nothing is bad - it is mainly about the rhythm (nerve impulse through the heart to beat correctly), however because he has upcoming surgery, it is necessary to evaluate his heart more. Dr. Vela wants him to do a \"stress echo\" where he does some exercise and then they do an ultrasound of his heart to see how it pumps. This will also be helpful when he recovers from the hip surgery and is more active.    He will get a phone call to set this test up.  "

## 2023-03-06 ENCOUNTER — TELEPHONE (OUTPATIENT)
Dept: FAMILY MEDICINE | Facility: CLINIC | Age: 43
End: 2023-03-06
Payer: COMMERCIAL

## 2023-03-06 NOTE — TELEPHONE ENCOUNTER
Called pt in an attempt to really Dr. Vela's message. Left VM to call back.    - if pt calls back, please relay Dr. Vela's message. Thanks.    Deng Arce Cem Say, BSN RN  [facility]

## 2023-03-06 NOTE — TELEPHONE ENCOUNTER
----- Message from Hanny Vela MD sent at 3/3/2023  9:33 PM CST -----  Regarding: help to schedule stress echo  Hi Margie -  Please call this patient to help him set up his stress echo. It needs to be done before his hip replacement surgery which is anticipated to be at the end of April or in May.  Thanks  Mandie

## 2023-03-06 NOTE — TELEPHONE ENCOUNTER
TC called pt but only left VM to schedule stress test.  First attempt.      Call taken on 3/6/2023 at 12:37 PM by EMILIO Corona

## 2023-03-07 NOTE — TELEPHONE ENCOUNTER
Called pt and imaging to schedule echocardiogram stress test, however, they were unable to answer so I left a detailed message to call pt back and or TC.  Thanks

## 2023-03-07 NOTE — TELEPHONE ENCOUNTER
Patient Returning Call    Reason for call:  Missed message from Dr. Vela/Margie.     Information relayed to patient:  Notified that staff as above has been trying to reach. Offered to take message so Margie or MD can call back.     Patient has additional questions:  No    What are your questions/concerns:  None. Patient available all day today. Please call back at [phone]. Thanks!    Could we send this information to you in [facility] or would you prefer to receive a phone call?:   Patient would prefer a phone call   Okay to leave a detailed message?: No at Other phone number:  [phone]

## 2023-03-07 NOTE — TELEPHONE ENCOUNTER
TC called pt a second time and left VM to call clinic to schedule stress test before pt surgery in late April or May.  Thanks

## 2023-03-08 ENCOUNTER — PATIENT OUTREACH (OUTPATIENT)
Dept: CARE COORDINATION | Facility: CLINIC | Age: 43
End: 2023-03-08
Payer: COMMERCIAL

## 2023-03-08 NOTE — LETTER
[facility] CARE COORDINATION  [address] [place] [address]    March 8, 2023        Cora Senior  [address]  [place] [address]          Dear Cora,     Attached is an updated Patient Centered Plan of Care for your continued enrollment in Care Coordination. Please let us know if you have additional questions, concerns, or goals that we can assist with.    Sincerely,    CLARI Barksdale  Primary Care Clinic- Social Work Care Coordinator  [facility] [place]  Ph: [phone]          [facility]  Patient Centered Plan of Care  About Me:        Patient Name:  Cora Senior    YOB: 1980  Age:         42 year old   [place] MRN:    [id number] Telephone Information:  Home Phone [phone]   Mobile [phone]       Address:  [address]  [place] [address] Email address:  [email or web address]      Emergency Contact(s)    Name Relationship Lgl Grd Work Phone Home Phone Mobile Phone   1. RAZIA SMITH Spouse   [phone]           Primary language:  English     needed? No   [place] [employer]:  [phone] op. 1  Other communication barriers:No data recorded  Preferred Method of Communication:     Current living arrangement: I live in a private home with family    Mobility Status/ Medical Equipment: No data recorded      Health Maintenance  Health Maintenance Reviewed: Due/Overdue   Health Maintenance Due   Topic Date Due     A1C  03/12/2023           My Access Plan  Medical Emergency 911   Primary Clinic Line [facility] [phone]   24 Hour Appointment Line [phone] or  [phone]) (toll-free)   24 Hour Nurse Line [phone] (toll-free)   Preferred Urgent Care [facility] [phone]     Preferred Hospital [facility]  [phone]     Preferred Pharmacy [id number] - [place], [place] - [address]     Behavioral Health Crisis Line The National Suicide Prevention Lifeline at 1-988.131.8517 or Text/Call 988             My Care Team Members  Patient Care Team       Relationship Specialty Notifications Start End    Hanny Vela MD PCP - General Family Medicine  4/20/22     Phone: [phone] Fax: [phone]         [address] [place] [address]    Hanny Vela MD Assigned PCP   4/1/22     Phone: [phone] Fax: [phone]         [address] [place] [address]    Zulay MILAN    4/7/22     Phone: [phone]         Goltz, Bennett Ezra, PA-C Assigned Neuroscience Provider   7/23/22     Phone: [phone] Fax: [phone] [address] [facility] [address] [address]    Giovani Green Community Health Worker Primary Care - CC Admissions 12/12/22     Phone: [phone] Fax: [phone]         [address] [place] [address] [address] [facility] [address]    Robert Cosme BSW Lead Care Coordinator  Admissions 1/2/23     Blanca Jensen MA Financial Resource Worker   1/16/23     Phone: [phone]         Dann Orozco MD Assigned Musculoskeletal Provider   2/11/23     Phone: [phone] Fax: [phone]         [address] [place] [address] [address] [facility] [address]            My Care Plans  Self Management and Treatment Plan  Care Plan  Care Plan: Social Security Card     Problem: Social Security Number issues     Priority: Medium Onset Date: 12/14/2022    Note:     Barriers: accessability  Strengths:motivated to resolve social security number issues  Patient expressed understanding of goal: yes  Action steps to achieve this goal:  1. I will go to the social security office   2. I will follow up as needed with social security  3. I will update CCSW as to my progress                Care Plan: Financial Wellbeing     Problem: Patient expresses financial resource strain     Goal: Create an action plan to increase  financial stability     Start Date: 12/14/2022 Expected End Date: 12/14/2023    This Visit's Progress: 40% Recent Progress: 30%    Priority: High    Note:     Barriers:understanding complex systems  Strengths: motivated to seek assistance  Patient expressed understanding of goal: yes    Action steps to achieve this goal:  1. I will answer the phone when [facility] calls for interview on 3/07/2023 between 7:30 AM to 12:30 PM.    2. I will explain my situation to W and answer any questions.   3. I will update CCC team at outreach.     Goal update: 2/22/2023                         Action Plans on File:                       Advance Care Plans/Directives Type:   No data recorded    My Medical and Care Information  Problem List   Patient Active Problem List   Diagnosis     Type 2 diabetes mellitus, without long-term current use of insulin (H)     Primary osteoarthritis of left hip     Cardiomegaly     Keloid scar     SULTANA (obstructive sleep apnea)     Idiopathic chronic gout of multiple sites without tophus      Current Medications and Allergies:  See printed Medication Report.    Care Coordination Start Date: 12/12/2022   Frequency of Care Coordination: monthly     Form Last Updated: 03/08/2023

## 2023-03-08 NOTE — PROGRESS NOTES
Clinic Care Coordination Contact  Care Coordination Clinician Chart Review    Situation: Patient chart reviewed by Care Coordinator.       Background: Care Coordination Program started: 12/12/2022. Initial assessment completed and patient-centered care plan(s) were developed with participation from patient. Lead CC handed patient off to CHW for continued outreaches.       Assessment: Per chart review, patient outreach completed by CC CHW on 2/22/2023.  Patient is actively working to accomplish goal(s). Patient's goal(s) appropriate and relevant at this time. Patient is due for updated Plan of Care.  Assessments will be completed annually or as needed/with change of patient status.      Care Plan: Social Security Card     Problem: Social Security Number issues     Priority: Medium Onset Date: 12/14/2022    Note:     Barriers: accessability  Strengths:motivated to resolve social security number issues  Patient expressed understanding of goal: yes  Action steps to achieve this goal:  1. I will go to the social security office   2. I will follow up as needed with social security  3. I will update CCSW as to my progress                Care Plan: Financial Wellbeing     Problem: Patient expresses financial resource strain     Goal: Create an action plan to increase financial stability     Start Date: 12/14/2022 Expected End Date: 12/14/2023    This Visit's Progress: 40% Recent Progress: 30%    Priority: High    Note:     Barriers:understanding complex systems  Strengths: motivated to seek assistance  Patient expressed understanding of goal: yes    Action steps to achieve this goal:  1. I will answer the phone when [facility] calls for interview on 3/07/2023 between 7:30 AM to 12:30 PM.    2. I will explain my situation to FRW and answer any questions.   3. I will update CCC team at outreach.     Goal update: 2/22/2023                           Plan/Recommendations: The patient will continue working with Care  Coordination to achieve goal(s) as above. CHW will continue outreaches at minimum every 30 days and will involve Lead CC as needed or if patient is ready to move to Maintenance. Lead CC will continue to monitor CHW outreaches and patient's progress to goal(s) every 6 weeks.     Plan of Care updated and sent to patient: Yes, via CLARI Pelaez  Primary Care Clinic- Social Work Care Coordinator  [facility] [place]  Ph: [phone]  3/8/2023 3:51 PM

## 2023-03-09 ENCOUNTER — PATIENT OUTREACH (OUTPATIENT)
Dept: CARE COORDINATION | Facility: CLINIC | Age: 43
End: 2023-03-09

## 2023-03-09 ENCOUNTER — ANCILLARY PROCEDURE (OUTPATIENT)
Dept: BONE DENSITY | Facility: CLINIC | Age: 43
End: 2023-03-09
Attending: ORTHOPAEDIC SURGERY
Payer: COMMERCIAL

## 2023-03-09 DIAGNOSIS — M16.12 PRIMARY OSTEOARTHRITIS OF LEFT HIP: ICD-10-CM

## 2023-03-09 LAB
ATRIAL RATE - MUSE: 83 BPM
DIASTOLIC BLOOD PRESSURE - MUSE: 70 MMHG
INTERPRETATION ECG - MUSE: NORMAL
P AXIS - MUSE: NORMAL DEGREES
PR INTERVAL - MUSE: NORMAL MS
QRS DURATION - MUSE: 86 MS
QT - MUSE: 392 MS
QTC - MUSE: 460 MS
R AXIS - MUSE: 151 DEGREES
SYSTOLIC BLOOD PRESSURE - MUSE: 106 MMHG
T AXIS - MUSE: 163 DEGREES
VENTRICULAR RATE- MUSE: 83 BPM

## 2023-03-09 PROCEDURE — 77080 DXA BONE DENSITY AXIAL: CPT | Performed by: INTERNAL MEDICINE

## 2023-03-09 NOTE — PROGRESS NOTES
Clinic Care Coordination Contact  Program: [facility]: [facility] Case #:  [facility] Worker:   [place]ard #:   Subscriber #:   Renewal:  Date Applied:     FRW Outreach:   3.9.2023  FRW called patient and left a vm with call back information. FRW will make outreach next week per CHW note Patient received letter from [facility] that his  interview was on  3/07/2023 between 7:30 AM to 12:30 PM.  1/19/2023 FRW called and the patient stated that if we call before 2pm we will need an  but after 2 no  needed. Patient stated that he applied in the middle of December for SNAP and FRW explained to him that [facility] is behind and we need to wait two more weeks to follow up on the application.     Health Insurance:      Referral/Screening:  ANDRE Screening    Row Name 01/16/23 1105       County Benefits   Is patient requesting help applying for [facility] benefits? Yes       Have you recently applied for any [facility] benefits? Yes       What was applied for?  SNAP       Application date: December 2022.       How many people in your household? 3       Do you buy/eat food together? Yes       What is the monthly gross income for the household (wages, social security, workers comp, and pension)?  1800       Insurance:   [place]? No       Is this an insurance renewal? No       Is this a new insurance application request? No       OTHER   Is this a denise care application? No       Any other information for the FRW? Please call spouse's contact at [phone] after 2 PM if unable to reach patient.

## 2023-03-20 ENCOUNTER — HOSPITAL ENCOUNTER (OUTPATIENT)
Dept: CARDIOLOGY | Facility: HOSPITAL | Age: 43
Discharge: HOME | End: 2023-03-20
Attending: FAMILY MEDICINE | Admitting: FAMILY MEDICINE
Payer: COMMERCIAL

## 2023-03-20 DIAGNOSIS — E11.22 TYPE 2 DIABETES MELLITUS WITH STAGE 3 CHRONIC KIDNEY DISEASE, WITHOUT LONG-TERM CURRENT USE OF INSULIN, UNSPECIFIED WHETHER STAGE 3A OR 3B CKD (H): ICD-10-CM

## 2023-03-20 DIAGNOSIS — R94.31 ABNORMAL ELECTROCARDIOGRAM: ICD-10-CM

## 2023-03-20 DIAGNOSIS — N18.30 TYPE 2 DIABETES MELLITUS WITH STAGE 3 CHRONIC KIDNEY DISEASE, WITHOUT LONG-TERM CURRENT USE OF INSULIN, UNSPECIFIED WHETHER STAGE 3A OR 3B CKD (H): ICD-10-CM

## 2023-03-20 DIAGNOSIS — I44.4 LAFB (LEFT ANTERIOR FASCICULAR BLOCK): ICD-10-CM

## 2023-03-20 DIAGNOSIS — M16.12 PRIMARY OSTEOARTHRITIS OF LEFT HIP: ICD-10-CM

## 2023-03-20 PROCEDURE — 93018 CV STRESS TEST I&R ONLY: CPT | Performed by: INTERNAL MEDICINE

## 2023-03-20 PROCEDURE — 93016 CV STRESS TEST SUPVJ ONLY: CPT | Performed by: INTERNAL MEDICINE

## 2023-03-20 PROCEDURE — 93350 STRESS TTE ONLY: CPT | Mod: 26 | Performed by: INTERNAL MEDICINE

## 2023-03-20 PROCEDURE — 93321 DOPPLER ECHO F-UP/LMTD STD: CPT | Mod: 26 | Performed by: INTERNAL MEDICINE

## 2023-03-20 PROCEDURE — 93017 CV STRESS TEST TRACING ONLY: CPT

## 2023-03-20 PROCEDURE — 93325 DOPPLER ECHO COLOR FLOW MAPG: CPT | Mod: 26 | Performed by: INTERNAL MEDICINE

## 2023-03-21 ENCOUNTER — TELEPHONE (OUTPATIENT)
Dept: FAMILY MEDICINE | Facility: CLINIC | Age: 43
End: 2023-03-21
Payer: COMMERCIAL

## 2023-03-21 NOTE — TELEPHONE ENCOUNTER
Called pt and relayed lab results. Pt verbalized understanding.    Deng Arce Cem Say, BSN RN  [facility]      ----- Message from Hanny Vela MD sent at 3/20/2023  8:36 PM CDT -----  Team - please call patient and let him know his stress echo results are terrific. He has a good, strong heart that beats well/regularly. This means he is fine to go ahead with his hip surgery (and that his high blood sugars did not damage his heart much).

## 2023-03-23 ENCOUNTER — PATIENT OUTREACH (OUTPATIENT)
Dept: CARE COORDINATION | Facility: CLINIC | Age: 43
End: 2023-03-23
Payer: COMMERCIAL

## 2023-03-23 NOTE — PROGRESS NOTES
Clinic Care Coordination Contact  Program: [facility]: [facility] Case #:  [facility] Worker:   Willard #:   Subscriber #:   Renewal:  Date Applied:     FRW Outreach:   3/23/23 FRW called patient and left a final vm with call back information as attempt x2 with no answer or return phone calls. FRW closed the FRW program and remove patient from panel. Patient can be referred back to FRW if needed.      3.9.2023  FRW called patient and left a vm with call back information. FRW will make outreach next week per CHW note Patient received letter from [facility] that his  interview was on  3/07/2023 between 7:30 AM to 12:30 PM.  1/19/2023 FRW called and the patient stated that if we call before 2pm we will need an  but after 2 no  needed. Patient stated that he applied in the middle of December for SNAP and FRW explained to him that [facility] is behind and we need to wait two more weeks to follow up on the application.     Health Insurance:      Referral/Screening:  FRW Screening    Row Name 01/16/23 1105       [place] Benefits   Is patient requesting help applying for [employer] benefits? Yes       Have you recently applied for any [employer] benefits? Yes       What was applied for?  SNAP       Application date: December 2022.       How many people in your household? 3       Do you buy/eat food together? Yes       What is the monthly gross income for the household (wages, social security, workers comp, and pension)?  1800       Insurance:   [place]? No       Is this an insurance renewal? No       Is this a new insurance application request? No       OTHER   Is this a denise care application? No       Any other information for the FRW? Please call spouse's contact at [phone] after 2 PM if unable to reach patient.

## 2023-03-30 ENCOUNTER — PATIENT OUTREACH (OUTPATIENT)
Dept: CARE COORDINATION | Facility: CLINIC | Age: 43
End: 2023-03-30
Payer: COMMERCIAL

## 2023-03-30 NOTE — PROGRESS NOTES
"Clinic Care Coordination Contact  Patient has completed all goals with Clinic Care Coordination.  Please review the chart and confirm if maintenance  is approved.    -[place] and [place] [facility] [place]460 per [place].   -Patient stated that his spouse is working full time and no immediate financial need at this time.  -Patient will confirm with [facility] which is Refuge[employer]ett[employer] [facility] to find out if he and family will qualify to apply for Green Card for permanent residency. If confirmed, patient will make appointments at the clinic for family to get Green Card physical exam for \"Do Not Open\" envelope and apply for Green Card.   -Per patient he does not need additional coordination assistance and no resource needed at this time.  -Patient was informed to call with questions or concerns.   -Chart review routed to CCC SW to further review and place on maintenance if appropriate.     "

## 2023-04-18 ENCOUNTER — PATIENT OUTREACH (OUTPATIENT)
Dept: CARE COORDINATION | Facility: CLINIC | Age: 43
End: 2023-04-18
Payer: COMMERCIAL

## 2023-04-18 NOTE — PROGRESS NOTES
Clinic Care Coordination Contact    W notified [facility] that patient has completed all goals with Clinic Care Coordination.  [facility] reviewed the chart and confirms that maintenance  is approved.    Robert Cosme, ESTEPHANIA, [facility]  Primary Care Clinic- Social Work Care Coordinator  [facility], [place], [place], [facility]  Assisting with Alcides   Ph: [phone]  4/18/2023 1:29 PM

## 2023-04-21 ENCOUNTER — TELEPHONE (OUTPATIENT)
Dept: ORTHOPEDICS | Facility: CLINIC | Age: 43
End: 2023-04-21

## 2023-04-21 DIAGNOSIS — Z01.818 PREOP GENERAL PHYSICAL EXAM: Primary | ICD-10-CM

## 2023-04-21 DIAGNOSIS — Z47.89 ORTHOPEDIC AFTERCARE: Primary | ICD-10-CM

## 2023-04-21 PROCEDURE — 99207 EKG 12-LEAD, TRACING ONLY: CPT | Performed by: FAMILY MEDICINE

## 2023-04-21 NOTE — TELEPHONE ENCOUNTER
Patient is scheduled for surgery with Dr. Orozco    Spoke with: Patient    Date of Surgery: 06/13/23    Location:  OR    Informed patient they will need an adult  yes    H&P: Scheduled with: PCP    Additional imaging/appointments: pop made    Surgery packet: mailed     Additional comments: N/A

## 2023-05-03 ENCOUNTER — OFFICE VISIT (OUTPATIENT)
Dept: FAMILY MEDICINE | Facility: CLINIC | Age: 43
End: 2023-05-03
Payer: COMMERCIAL

## 2023-05-03 VITALS
WEIGHT: 212 LBS | SYSTOLIC BLOOD PRESSURE: 109 MMHG | HEIGHT: 69 IN | DIASTOLIC BLOOD PRESSURE: 76 MMHG | TEMPERATURE: 98.3 F | BODY MASS INDEX: 31.4 KG/M2 | RESPIRATION RATE: 15 BRPM | HEART RATE: 112 BPM | OXYGEN SATURATION: 98 %

## 2023-05-03 DIAGNOSIS — E11.9 TYPE 2 DIABETES MELLITUS WITHOUT COMPLICATION, WITHOUT LONG-TERM CURRENT USE OF INSULIN (H): Primary | ICD-10-CM

## 2023-05-03 DIAGNOSIS — Z28.39 IMMUNIZATION DEFICIENCY: ICD-10-CM

## 2023-05-03 PROCEDURE — 90471 IMMUNIZATION ADMIN: CPT | Performed by: FAMILY MEDICINE

## 2023-05-03 PROCEDURE — 90715 TDAP VACCINE 7 YRS/> IM: CPT | Performed by: FAMILY MEDICINE

## 2023-05-03 PROCEDURE — 99213 OFFICE O/P EST LOW 20 MIN: CPT | Mod: 25 | Performed by: FAMILY MEDICINE
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
## 2023-05-18 NOTE — PROVIDER NOTIFICATION
05/18/23 1059   Discharge Planning   Patient/Family Anticipates Transition to home with family   Concerns to be Addressed all concerns addressed in this encounter   Living Arrangements   Number of Stairs, Within Home, Primary greater than 10 stairs   Stair Railings, Within Home, Primary railing on right side (ascending)   Once home, are you able to live on one level? Yes   Bathroom Shower/Tub Tub/Shower unit   Equipment Currently Used at Home none   Support System   Support Systems Spouse/Significant Other   Do you have someone available to stay with you one or two nights once you are home? Yes   Blood   Known Bleeding Disorder or Coagulopathy No   Education   Patient attended total joint pre-op class/received pre-op teaching  online

## 2023-05-19 ENCOUNTER — OFFICE VISIT (OUTPATIENT)
Dept: FAMILY MEDICINE | Facility: CLINIC | Age: 43
End: 2023-05-19
Payer: COMMERCIAL

## 2023-05-19 VITALS
TEMPERATURE: 97.7 F | BODY MASS INDEX: 31.4 KG/M2 | OXYGEN SATURATION: 99 % | WEIGHT: 212 LBS | HEART RATE: 116 BPM | HEIGHT: 69 IN | DIASTOLIC BLOOD PRESSURE: 83 MMHG | RESPIRATION RATE: 16 BRPM | SYSTOLIC BLOOD PRESSURE: 120 MMHG

## 2023-05-19 DIAGNOSIS — M16.12 PRIMARY OSTEOARTHRITIS OF LEFT HIP: ICD-10-CM

## 2023-05-19 DIAGNOSIS — M1A.09X0 IDIOPATHIC CHRONIC GOUT OF MULTIPLE SITES WITHOUT TOPHUS: ICD-10-CM

## 2023-05-19 DIAGNOSIS — I51.7 CARDIOMEGALY: ICD-10-CM

## 2023-05-19 DIAGNOSIS — E11.22 TYPE 2 DIABETES MELLITUS WITH CHRONIC KIDNEY DISEASE, WITHOUT LONG-TERM CURRENT USE OF INSULIN, UNSPECIFIED CKD STAGE (H): ICD-10-CM

## 2023-05-19 DIAGNOSIS — G47.33 OSA (OBSTRUCTIVE SLEEP APNEA): ICD-10-CM

## 2023-05-19 DIAGNOSIS — Z01.818 PREOP GENERAL PHYSICAL EXAM: Primary | ICD-10-CM

## 2023-05-19 LAB
ALBUMIN SERPL BCG-MCNC: 4.4 G/DL (ref 3.5–5.2)
ALBUMIN UR-MCNC: NEGATIVE MG/DL
ALP SERPL-CCNC: 113 U/L (ref 40–129)
ALT SERPL W P-5'-P-CCNC: 39 U/L (ref 10–50)
ANION GAP SERPL CALCULATED.3IONS-SCNC: 14 MMOL/L (ref 7–15)
APPEARANCE UR: CLEAR
AST SERPL W P-5'-P-CCNC: 44 U/L (ref 10–50)
BACTERIA #/AREA URNS HPF: ABNORMAL /HPF
BASOPHILS # BLD AUTO: 0 10E3/UL (ref 0–0.2)
BASOPHILS NFR BLD AUTO: 0 %
BILIRUB SERPL-MCNC: 0.4 MG/DL
BILIRUB UR QL STRIP: NEGATIVE
BUN SERPL-MCNC: 13.9 MG/DL (ref 6–20)
CALCIUM SERPL-MCNC: 9.4 MG/DL (ref 8.6–10)
CHLORIDE SERPL-SCNC: 98 MMOL/L (ref 98–107)
COLOR UR AUTO: YELLOW
CREAT SERPL-MCNC: 0.85 MG/DL (ref 0.67–1.17)
CREAT UR-MCNC: 100 MG/DL
DEPRECATED HCO3 PLAS-SCNC: 23 MMOL/L (ref 22–29)
EOSINOPHIL # BLD AUTO: 0.1 10E3/UL (ref 0–0.7)
EOSINOPHIL NFR BLD AUTO: 2 %
ERYTHROCYTE [DISTWIDTH] IN BLOOD BY AUTOMATED COUNT: 12.7 % (ref 10–15)
GFR SERPL CREATININE-BSD FRML MDRD: >90 ML/MIN/1.73M2
GLUCOSE SERPL-MCNC: 329 MG/DL (ref 70–99)
GLUCOSE UR STRIP-MCNC: 500 MG/DL
HBA1C MFR BLD: 7.2 % (ref 0–5.6)
HCT VFR BLD AUTO: 48.2 % (ref 40–53)
HGB BLD-MCNC: 15.9 G/DL (ref 13.3–17.7)
HGB UR QL STRIP: ABNORMAL
IMM GRANULOCYTES # BLD: 0 10E3/UL
IMM GRANULOCYTES NFR BLD: 0 %
KETONES UR STRIP-MCNC: NEGATIVE MG/DL
LEUKOCYTE ESTERASE UR QL STRIP: NEGATIVE
LYMPHOCYTES # BLD AUTO: 2.1 10E3/UL (ref 0.8–5.3)
LYMPHOCYTES NFR BLD AUTO: 23 %
MCH RBC QN AUTO: 28.1 PG (ref 26.5–33)
MCHC RBC AUTO-ENTMCNC: 33 G/DL (ref 31.5–36.5)
MCV RBC AUTO: 85 FL (ref 78–100)
MICROALBUMIN UR-MCNC: 23.5 MG/L
MICROALBUMIN/CREAT UR: 23.5 MG/G CR (ref 0–17)
MONOCYTES # BLD AUTO: 0.7 10E3/UL (ref 0–1.3)
MONOCYTES NFR BLD AUTO: 7 %
MUCOUS THREADS #/AREA URNS LPF: PRESENT /LPF
NEUTROPHILS # BLD AUTO: 6.3 10E3/UL (ref 1.6–8.3)
NEUTROPHILS NFR BLD AUTO: 68 %
NITRATE UR QL: NEGATIVE
PH UR STRIP: 5.5 [PH] (ref 5–7)
PLATELET # BLD AUTO: 290 10E3/UL (ref 150–450)
POTASSIUM SERPL-SCNC: 4 MMOL/L (ref 3.4–5.3)
PROT SERPL-MCNC: 8.2 G/DL (ref 6.4–8.3)
RBC # BLD AUTO: 5.66 10E6/UL (ref 4.4–5.9)
RBC #/AREA URNS AUTO: ABNORMAL /HPF
SODIUM SERPL-SCNC: 135 MMOL/L (ref 136–145)
SP GR UR STRIP: 1.01 (ref 1–1.03)
SQUAMOUS #/AREA URNS AUTO: ABNORMAL /LPF
URATE SERPL-MCNC: 7.1 MG/DL (ref 3.4–7)
UROBILINOGEN UR STRIP-ACNC: 0.2 E.U./DL
WBC # BLD AUTO: 9.2 10E3/UL (ref 4–11)
WBC #/AREA URNS AUTO: ABNORMAL /HPF

## 2023-05-19 PROCEDURE — 82306 VITAMIN D 25 HYDROXY: CPT | Performed by: FAMILY MEDICINE

## 2023-05-19 PROCEDURE — 93005 ELECTROCARDIOGRAM TRACING: CPT | Performed by: FAMILY MEDICINE

## 2023-05-19 PROCEDURE — 81001 URINALYSIS AUTO W/SCOPE: CPT | Performed by: FAMILY MEDICINE

## 2023-05-19 PROCEDURE — 80053 COMPREHEN METABOLIC PANEL: CPT | Performed by: FAMILY MEDICINE

## 2023-05-19 PROCEDURE — 93010 ELECTROCARDIOGRAM REPORT: CPT | Performed by: INTERNAL MEDICINE

## 2023-05-19 PROCEDURE — 82043 UR ALBUMIN QUANTITATIVE: CPT | Performed by: FAMILY MEDICINE

## 2023-05-19 PROCEDURE — 85025 COMPLETE CBC W/AUTO DIFF WBC: CPT | Performed by: FAMILY MEDICINE

## 2023-05-19 PROCEDURE — 99214 OFFICE O/P EST MOD 30 MIN: CPT | Performed by: FAMILY MEDICINE

## 2023-05-19 PROCEDURE — 84550 ASSAY OF BLOOD/URIC ACID: CPT | Performed by: FAMILY MEDICINE

## 2023-05-19 PROCEDURE — 36415 COLL VENOUS BLD VENIPUNCTURE: CPT | Performed by: FAMILY MEDICINE

## 2023-05-19 PROCEDURE — 82570 ASSAY OF URINE CREATININE: CPT | Performed by: FAMILY MEDICINE

## 2023-05-19 PROCEDURE — 83036 HEMOGLOBIN GLYCOSYLATED A1C: CPT | Performed by: FAMILY MEDICINE
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
## 2023-05-22 LAB — DEPRECATED CALCIDIOL+CALCIFEROL SERPL-MC: 73 UG/L (ref 20–75)

## 2023-05-26 ENCOUNTER — OFFICE VISIT (OUTPATIENT)
Dept: ORTHOPEDICS | Facility: CLINIC | Age: 43
End: 2023-05-26
Payer: COMMERCIAL

## 2023-05-26 DIAGNOSIS — M16.12 PRIMARY OSTEOARTHRITIS OF LEFT HIP: ICD-10-CM

## 2023-05-26 PROCEDURE — 99214 OFFICE O/P EST MOD 30 MIN: CPT | Performed by: ORTHOPAEDIC SURGERY

## 2023-05-26 RX ORDER — ALLOPURINOL 100 MG/1
100 TABLET ORAL DAILY
Qty: 90 TABLET | Refills: 1 | Status: SHIPPED | OUTPATIENT
Start: 2023-05-26 | End: 2023-09-14
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
## 2023-05-26 NOTE — NURSING NOTE
Reason For Visit:   Chief Complaint   Patient presents with     RECHECK     Patient here for final pre-op office visit.  Pre-op physical exam with PCP on 5/19/23, Dr. Vela.  Dental clearance note provided in office.       Left IVONNE on 6/13/23.        There were no vitals taken for this visit.         Dann Mittal, ATC

## 2023-06-01 ENCOUNTER — PATIENT OUTREACH (OUTPATIENT)
Dept: CARE COORDINATION | Facility: CLINIC | Age: 43
End: 2023-06-01
Payer: COMMERCIAL

## 2023-06-01 LAB
ATRIAL RATE - MUSE: 106 BPM
DIASTOLIC BLOOD PRESSURE - MUSE: NORMAL MMHG
INTERPRETATION ECG - MUSE: NORMAL
P AXIS - MUSE: 53 DEGREES
PR INTERVAL - MUSE: 148 MS
QRS DURATION - MUSE: 76 MS
QT - MUSE: 352 MS
QTC - MUSE: 467 MS
R AXIS - MUSE: 28 DEGREES
SYSTOLIC BLOOD PRESSURE - MUSE: NORMAL MMHG
T AXIS - MUSE: 50 DEGREES
VENTRICULAR RATE- MUSE: 106 BPM

## 2023-06-01 RX ORDER — IBUPROFEN 200 MG
200 TABLET ORAL EVERY 6 HOURS PRN
Status: ON HOLD | COMMUNITY
End: 2023-06-19

## 2023-06-01 NOTE — PROGRESS NOTES
Clinic Care Coordination Contact  Patient has completed all goals with Clinic Care Coordination.  Please review the chart and confirm if graduation is approved.    -Per patient, he's aware of upcoming appointments and procedure.   -Patient does not need coordination assistance at this time and he's able to manage it by himself.  -Patient did not address or establish new goal on today follow up.  -Patient and family [place] receiving [place]460 per months and spouse works full time.  -Patient plans to return to work after surgery and if recover well.  -Patient was given [facility] phone number to request medical ride and he's able to request medical ride for himself.  -CCC SW to further review chart and advise.

## 2023-06-01 NOTE — PROGRESS NOTES
Ortho Navigator Note      Pre-op Date 5/19/23     Medical Clearance  Cleared     Labs WNR with exception to . Non-fasting   A1C 7.2 down from 7.6 in December 22     COVID Test Date No longer indicated      Skin  Intact      Activity: Ambulates independently without assistive device      Equipment Need Patient will bring a walker for discharge. Defer to PT/OT for recs.       Meds to Hold Held all supplements 14 days prior to surgery  Hold indomethacin for 7 days prior to surgery per PCP  Hold Ibuprofen 24 hr prior to DOS   Hold Lisinopril DOS   Hold Metformin DOS.   * Medication recommendations are not intended to be exhaustive; they are limited to common medications that are potentially dangerous if incorrectly managed   NPO Instructions  Defer to PAN RN     Pre-op Joint Education Complete? Complete   Discharge Plan Patient has plan to discharge home on morning of POD 1.   Jessenia (spouse) will arrive at hospital at 0800 to participate in therapy and discharge education. They will then transport patient home    /Transportation Jessenia will be .  is physically able to care for patient.      Barriers to Discharge No barriers to discharge.      Additional Info/   Special Needs : Patient had no unanswered questions or concerns.            05/18/23 1059   Discharge Planning   Patient/Family Anticipates Transition to home with family   Concerns to be Addressed all concerns addressed in this encounter   Living Arrangements   People in Home spouse;child(juliette), dependent   Type of Residence Private Residence   Is your private residence a single family home or apartment? Apartment   Number of Stairs, Within Home, Primary greater than 10 stairs   Stair Railings, Within Home, Primary railing on right side (ascending)   Once home, are you able to live on one level? Yes   Bathroom Shower/Tub Tub/Shower unit   Equipment Currently Used at Home none   Support System   Support Systems Spouse/Significant Other   Do  you have someone available to stay with you one or two nights once you are home? Yes   Medical Clearance   Date of Physical 05/19/23   [facility]inic Name [facility]   It is recommended that you call and check with any specialty providers before surgery to see if you need surgical clearance.  Do you see any specialty providers outside of your primary care provider? No   Blood   Known Bleeding Disorder or Coagulopathy No   Does the patient have any Restoration/cultural preferences related to blood products? No   Education   Has the patient scheduled or completed pre-op total joint education, either in class or online, in the last 12 months? Yes   Patient attended total joint pre-op class/received pre-op teaching  online   Relationship/Living Environment   Name(s) of People in Home Jessenia (spouse)

## 2023-06-05 ENCOUNTER — PATIENT OUTREACH (OUTPATIENT)
Dept: CARE COORDINATION | Facility: CLINIC | Age: 43
End: 2023-06-05
Payer: COMMERCIAL

## 2023-06-05 NOTE — PROGRESS NOTES
Clinic Care Coordination Contact    Assessment: Care Coordinator contacted patient for 2 month follow up.  Patient has continued to follow the plan of care and assessment is negative for any new needs or concerns.    Enrollment status: Graduated.      Plan: No further outreaches at this time.  Patient will continue to follow the plan of care.  If new needs arise a new Care Coordination referral may be placed.      ESTEPHANIA Barksdale, [facility]  Primary Care Clinic- Social Work Care Coordinator  [facility] [place] [facility]  Assisting with Alcides   Ph: [phone]  6/5/2023 12:28 PM

## 2023-06-09 NOTE — PHARMACY-ADMISSION MEDICATION HISTORY
Medication history and patient interview completed by pre-admitting RN or pre-op/PACU RN. [facility]ist[facility], [facility] Everywhere, and chart review.       Juwan Wong, Pharm.D., BCPS      Status Changed by Time of change   Nurse Abbie Seymour, RN u May 18, 2023 10:41 AM       Prior to Admission medications    Medication Sig Last Dose Taking? Auth Provider Long Term End Date   acetaminophen (TYLENOL) 500 MG tablet Take 1-2 tablets (500-1,000 mg) by mouth every 8 hours as needed for mild pain  Yes Hanny Vela MD     allopurinol (ZYLOPRIM) 100 MG tablet Take 1 tablet (100 mg) by mouth daily Take with one 300 mg tablet daily  Yes Dann Orozco MD     allopurinol (ZYLOPRIM) 300 MG tablet Take 1 tablet (300 mg) by mouth daily  Yes Dann Orozco MD     atorvastatin (LIPITOR) 20 MG tablet Take 1 tablet (20 mg) by mouth daily  Yes Hanny Vela MD Yes    ibuprofen (ADVIL/MOTRIN) 200 MG tablet Take 200 mg by mouth every 6 hours as needed for pain  Yes Reported, Patient     indomethacin (INDOCIN) 50 MG capsule Take 1 capsule (50 mg) by mouth 2 times daily (with meals)  Yes Hanny Vela MD Yes    lisinopril (ZESTRIL) 2.5 MG tablet Take 1 tablet (2.5 mg) by mouth daily  Yes Hanny Vela MD Yes    metFORMIN (GLUCOPHAGE XR) 500 MG 24 hr tablet Take 4 tablets (2,000 mg) by mouth daily (with dinner)  Yes Sierra Fabian MD Yes

## 2023-06-13 ENCOUNTER — ANESTHESIA (OUTPATIENT)
Dept: SURGERY | Facility: CLINIC | Age: 43
End: 2023-06-13
Payer: COMMERCIAL

## 2023-06-13 ENCOUNTER — APPOINTMENT (OUTPATIENT)
Dept: GENERAL RADIOLOGY | Facility: CLINIC | Age: 43
End: 2023-06-13
Attending: PHYSICIAN ASSISTANT
Payer: COMMERCIAL

## 2023-06-13 ENCOUNTER — APPOINTMENT (OUTPATIENT)
Dept: PHYSICAL THERAPY | Facility: CLINIC | Age: 43
End: 2023-06-13
Attending: ORTHOPAEDIC SURGERY
Payer: COMMERCIAL

## 2023-06-13 ENCOUNTER — ANESTHESIA EVENT (OUTPATIENT)
Dept: SURGERY | Facility: CLINIC | Age: 43
End: 2023-06-13
Payer: COMMERCIAL

## 2023-06-13 ENCOUNTER — HOSPITAL ENCOUNTER (INPATIENT)
Facility: CLINIC | Age: 43
LOS: 4 days | Discharge: HOME | End: 2023-06-19
Attending: ORTHOPAEDIC SURGERY | Admitting: ORTHOPAEDIC SURGERY
Payer: COMMERCIAL

## 2023-06-13 ENCOUNTER — APPOINTMENT (OUTPATIENT)
Dept: GENERAL RADIOLOGY | Facility: CLINIC | Age: 43
End: 2023-06-13
Attending: ORTHOPAEDIC SURGERY
Payer: COMMERCIAL

## 2023-06-13 DIAGNOSIS — D50.9 IRON DEFICIENCY ANEMIA, UNSPECIFIED IRON DEFICIENCY ANEMIA TYPE: ICD-10-CM

## 2023-06-13 DIAGNOSIS — G89.18 ACUTE POST-OPERATIVE PAIN: ICD-10-CM

## 2023-06-13 DIAGNOSIS — M16.12 PRIMARY OSTEOARTHRITIS OF LEFT HIP: ICD-10-CM

## 2023-06-13 DIAGNOSIS — Z96.642 S/P HIP REPLACEMENT, LEFT: ICD-10-CM

## 2023-06-13 DIAGNOSIS — Z76.89 HEALTH CARE HOME: ICD-10-CM

## 2023-06-13 DIAGNOSIS — K59.03 DRUG-INDUCED CONSTIPATION: ICD-10-CM

## 2023-06-13 DIAGNOSIS — Z00.00 HEALTHCARE MAINTENANCE: ICD-10-CM

## 2023-06-13 DIAGNOSIS — Z78.9 DEEP VEIN THROMBOSIS (DVT) PROPHYLAXIS PRESCRIBED AT DISCHARGE: Primary | ICD-10-CM

## 2023-06-13 LAB
GLUCOSE BLDC GLUCOMTR-MCNC: 144 MG/DL (ref 70–99)
GLUCOSE BLDC GLUCOMTR-MCNC: 166 MG/DL (ref 70–99)
GLUCOSE BLDC GLUCOMTR-MCNC: 167 MG/DL (ref 70–99)
GRAM STAIN RESULT: ABNORMAL

## 2023-06-13 PROCEDURE — 258N000003 HC RX IP 258 OP 636: Performed by: PHYSICIAN ASSISTANT

## 2023-06-13 PROCEDURE — 88313 SPECIAL STAINS GROUP 2: CPT | Mod: 26 | Performed by: PATHOLOGY

## 2023-06-13 PROCEDURE — 87205 SMEAR GRAM STAIN: CPT | Performed by: ORTHOPAEDIC SURGERY

## 2023-06-13 PROCEDURE — 250N000009 HC RX 250: Performed by: ORTHOPAEDIC SURGERY

## 2023-06-13 PROCEDURE — 370N000017 HC ANESTHESIA TECHNICAL FEE, PER MIN: Performed by: ORTHOPAEDIC SURGERY

## 2023-06-13 PROCEDURE — 258N000001 HC RX 258: Performed by: ORTHOPAEDIC SURGERY

## 2023-06-13 PROCEDURE — 88305 TISSUE EXAM BY PATHOLOGIST: CPT | Mod: 26 | Performed by: PATHOLOGY

## 2023-06-13 PROCEDURE — 87070 CULTURE OTHR SPECIMN AEROBIC: CPT | Performed by: ORTHOPAEDIC SURGERY

## 2023-06-13 PROCEDURE — 250N000011 HC RX IP 250 OP 636: Performed by: ORTHOPAEDIC SURGERY

## 2023-06-13 PROCEDURE — 250N000011 HC RX IP 250 OP 636: Performed by: NURSE ANESTHETIST, CERTIFIED REGISTERED

## 2023-06-13 PROCEDURE — 999N000065 XR PELVIS AND HIP PORTABLE LEFT 1 VIEW

## 2023-06-13 PROCEDURE — C1776 JOINT DEVICE (IMPLANTABLE): HCPCS | Performed by: ORTHOPAEDIC SURGERY

## 2023-06-13 PROCEDURE — 360N000084 HC SURGERY LEVEL 4 W/ FLUORO, PER MIN: Performed by: ORTHOPAEDIC SURGERY

## 2023-06-13 PROCEDURE — 999N000141 HC STATISTIC PRE-PROCEDURE NURSING ASSESSMENT: Performed by: ORTHOPAEDIC SURGERY

## 2023-06-13 PROCEDURE — 250N000011 HC RX IP 250 OP 636: Performed by: ANESTHESIOLOGY

## 2023-06-13 PROCEDURE — 258N000003 HC RX IP 258 OP 636: Performed by: NURSE ANESTHETIST, CERTIFIED REGISTERED

## 2023-06-13 PROCEDURE — 87102 FUNGUS ISOLATION CULTURE: CPT | Performed by: ORTHOPAEDIC SURGERY

## 2023-06-13 PROCEDURE — 97530 THERAPEUTIC ACTIVITIES: CPT | Mod: GP | Performed by: PHYSICAL THERAPIST

## 2023-06-13 PROCEDURE — 258N000003 HC RX IP 258 OP 636: Performed by: ANESTHESIOLOGY

## 2023-06-13 PROCEDURE — 250N000013 HC RX MED GY IP 250 OP 250 PS 637: Performed by: PHYSICIAN ASSISTANT

## 2023-06-13 PROCEDURE — 87206 SMEAR FLUORESCENT/ACID STAI: CPT | Performed by: ORTHOPAEDIC SURGERY

## 2023-06-13 PROCEDURE — 88305 TISSUE EXAM BY PATHOLOGIST: CPT | Mod: TC | Performed by: ORTHOPAEDIC SURGERY

## 2023-06-13 PROCEDURE — C1713 ANCHOR/SCREW BN/BN,TIS/BN: HCPCS | Performed by: ORTHOPAEDIC SURGERY

## 2023-06-13 PROCEDURE — 710N000009 HC RECOVERY PHASE 1, LEVEL 1, PER MIN: Performed by: ORTHOPAEDIC SURGERY

## 2023-06-13 PROCEDURE — 87075 CULTR BACTERIA EXCEPT BLOOD: CPT | Performed by: ORTHOPAEDIC SURGERY

## 2023-06-13 PROCEDURE — 97161 PT EVAL LOW COMPLEX 20 MIN: CPT | Mod: GP | Performed by: PHYSICAL THERAPIST

## 2023-06-13 PROCEDURE — 250N000009 HC RX 250: Performed by: NURSE ANESTHETIST, CERTIFIED REGISTERED

## 2023-06-13 PROCEDURE — 27130 TOTAL HIP ARTHROPLASTY: CPT | Mod: LT | Performed by: ORTHOPAEDIC SURGERY

## 2023-06-13 PROCEDURE — 999N000179 XR SURGERY CARM FLUORO LESS THAN 5 MIN W STILLS: Mod: TC

## 2023-06-13 PROCEDURE — 0SRB02A REPLACEMENT OF LEFT HIP JOINT WITH METAL ON POLYETHYLENE SYNTHETIC SUBSTITUTE, UNCEMENTED, OPEN APPROACH: ICD-10-PCS | Performed by: ORTHOPAEDIC SURGERY

## 2023-06-13 PROCEDURE — 82962 GLUCOSE BLOOD TEST: CPT

## 2023-06-13 PROCEDURE — 250N000025 HC SEVOFLURANE, PER MIN: Performed by: ORTHOPAEDIC SURGERY

## 2023-06-13 PROCEDURE — 87116 MYCOBACTERIA CULTURE: CPT | Performed by: ORTHOPAEDIC SURGERY

## 2023-06-13 PROCEDURE — 250N000011 HC RX IP 250 OP 636: Performed by: PHYSICIAN ASSISTANT

## 2023-06-13 PROCEDURE — 272N000001 HC OR GENERAL SUPPLY STERILE: Performed by: ORTHOPAEDIC SURGERY

## 2023-06-13 DEVICE — IMP SCR BONE CAN ACE 6.5X30MM 1217-30-500: Type: IMPLANTABLE DEVICE | Site: HIP | Status: FUNCTIONAL

## 2023-06-13 DEVICE — IMP STEM FEM HIP DEPUY CORAIL TPR SZ 12 STD OFFSET 3L92502: Type: IMPLANTABLE DEVICE | Site: HIP | Status: FUNCTIONAL

## 2023-06-13 DEVICE — IMP INSERT HIP DEPUY PINNACLE ALTRX 36X54MM 1221-36-054: Type: IMPLANTABLE DEVICE | Site: HIP | Status: FUNCTIONAL

## 2023-06-13 DEVICE — IMPLANTABLE DEVICE: Type: IMPLANTABLE DEVICE | Site: HIP | Status: FUNCTIONAL

## 2023-06-13 RX ORDER — AMOXICILLIN 250 MG
1 CAPSULE ORAL 2 TIMES DAILY
Status: DISCONTINUED | OUTPATIENT
Start: 2023-06-13 | End: 2023-06-19 | Stop reason: HOSPADM

## 2023-06-13 RX ORDER — SODIUM CHLORIDE, SODIUM LACTATE, POTASSIUM CHLORIDE, CALCIUM CHLORIDE 600; 310; 30; 20 MG/100ML; MG/100ML; MG/100ML; MG/100ML
INJECTION, SOLUTION INTRAVENOUS CONTINUOUS PRN
Status: DISCONTINUED | OUTPATIENT
Start: 2023-06-13 | End: 2023-06-13

## 2023-06-13 RX ORDER — NALOXONE HYDROCHLORIDE 0.4 MG/ML
0.2 INJECTION, SOLUTION INTRAMUSCULAR; INTRAVENOUS; SUBCUTANEOUS
Status: DISCONTINUED | OUTPATIENT
Start: 2023-06-13 | End: 2023-06-19 | Stop reason: HOSPADM

## 2023-06-13 RX ORDER — CEFAZOLIN SODIUM/WATER 2 G/20 ML
2 SYRINGE (ML) INTRAVENOUS SEE ADMIN INSTRUCTIONS
Status: DISCONTINUED | OUTPATIENT
Start: 2023-06-13 | End: 2023-06-13 | Stop reason: HOSPADM

## 2023-06-13 RX ORDER — FENTANYL CITRATE 50 UG/ML
INJECTION, SOLUTION INTRAMUSCULAR; INTRAVENOUS PRN
Status: DISCONTINUED | OUTPATIENT
Start: 2023-06-13 | End: 2023-06-13

## 2023-06-13 RX ORDER — SODIUM CHLORIDE, SODIUM LACTATE, POTASSIUM CHLORIDE, CALCIUM CHLORIDE 600; 310; 30; 20 MG/100ML; MG/100ML; MG/100ML; MG/100ML
INJECTION, SOLUTION INTRAVENOUS CONTINUOUS
Status: DISCONTINUED | OUTPATIENT
Start: 2023-06-13 | End: 2023-06-17

## 2023-06-13 RX ORDER — ACETAMINOPHEN 325 MG/1
975 TABLET ORAL EVERY 8 HOURS
Status: COMPLETED | OUTPATIENT
Start: 2023-06-13 | End: 2023-06-16

## 2023-06-13 RX ORDER — ACETAMINOPHEN 325 MG/1
650 TABLET ORAL EVERY 4 HOURS PRN
Qty: 100 TABLET | Refills: 0 | Status: SHIPPED | OUTPATIENT
Start: 2023-06-13 | End: 2024-03-05

## 2023-06-13 RX ORDER — SODIUM CHLORIDE, SODIUM LACTATE, POTASSIUM CHLORIDE, CALCIUM CHLORIDE 600; 310; 30; 20 MG/100ML; MG/100ML; MG/100ML; MG/100ML
INJECTION, SOLUTION INTRAVENOUS CONTINUOUS
Status: DISCONTINUED | OUTPATIENT
Start: 2023-06-13 | End: 2023-06-13 | Stop reason: HOSPADM

## 2023-06-13 RX ORDER — OXYCODONE HYDROCHLORIDE 10 MG/1
10 TABLET ORAL EVERY 4 HOURS PRN
Status: DISCONTINUED | OUTPATIENT
Start: 2023-06-13 | End: 2023-06-14

## 2023-06-13 RX ORDER — POLYETHYLENE GLYCOL 3350 17 G/17G
17 POWDER, FOR SOLUTION ORAL DAILY
Status: DISCONTINUED | OUTPATIENT
Start: 2023-06-14 | End: 2023-06-19 | Stop reason: HOSPADM

## 2023-06-13 RX ORDER — HYDROXYZINE HYDROCHLORIDE 25 MG/1
25 TABLET, FILM COATED ORAL EVERY 6 HOURS PRN
Status: DISCONTINUED | OUTPATIENT
Start: 2023-06-13 | End: 2023-06-19 | Stop reason: HOSPADM

## 2023-06-13 RX ORDER — PROPOFOL 10 MG/ML
INJECTION, EMULSION INTRAVENOUS CONTINUOUS PRN
Status: DISCONTINUED | OUTPATIENT
Start: 2023-06-13 | End: 2023-06-13

## 2023-06-13 RX ORDER — DEXMEDETOMIDINE HYDROCHLORIDE 4 UG/ML
INJECTION, SOLUTION INTRAVENOUS PRN
Status: DISCONTINUED | OUTPATIENT
Start: 2023-06-13 | End: 2023-06-13

## 2023-06-13 RX ORDER — NALOXONE HYDROCHLORIDE 0.4 MG/ML
0.4 INJECTION, SOLUTION INTRAMUSCULAR; INTRAVENOUS; SUBCUTANEOUS
Status: DISCONTINUED | OUTPATIENT
Start: 2023-06-13 | End: 2023-06-19 | Stop reason: HOSPADM

## 2023-06-13 RX ORDER — ASPIRIN 81 MG/1
81 TABLET ORAL 2 TIMES DAILY
Status: DISCONTINUED | OUTPATIENT
Start: 2023-06-13 | End: 2023-06-19 | Stop reason: HOSPADM

## 2023-06-13 RX ORDER — LISINOPRIL 2.5 MG/1
2.5 TABLET ORAL DAILY
Status: DISCONTINUED | OUTPATIENT
Start: 2023-06-14 | End: 2023-06-19 | Stop reason: HOSPADM

## 2023-06-13 RX ORDER — LIDOCAINE 40 MG/G
CREAM TOPICAL
Status: DISCONTINUED | OUTPATIENT
Start: 2023-06-13 | End: 2023-06-13 | Stop reason: HOSPADM

## 2023-06-13 RX ORDER — ONDANSETRON 4 MG/1
4 TABLET, ORALLY DISINTEGRATING ORAL EVERY 30 MIN PRN
Status: DISCONTINUED | OUTPATIENT
Start: 2023-06-13 | End: 2023-06-13 | Stop reason: HOSPADM

## 2023-06-13 RX ORDER — HYDROMORPHONE HCL IN WATER/PF 6 MG/30 ML
0.2 PATIENT CONTROLLED ANALGESIA SYRINGE INTRAVENOUS EVERY 5 MIN PRN
Status: DISCONTINUED | OUTPATIENT
Start: 2023-06-13 | End: 2023-06-13 | Stop reason: HOSPADM

## 2023-06-13 RX ORDER — OXYCODONE HYDROCHLORIDE 5 MG/1
5-10 TABLET ORAL EVERY 4 HOURS PRN
Qty: 26 TABLET | Refills: 0 | Status: SHIPPED | OUTPATIENT
Start: 2023-06-13 | End: 2023-06-30

## 2023-06-13 RX ORDER — NALOXONE HYDROCHLORIDE 1 MG/ML
0.4 INJECTION INTRAMUSCULAR; INTRAVENOUS; SUBCUTANEOUS ONCE
Status: COMPLETED | OUTPATIENT
Start: 2023-06-13 | End: 2023-06-13

## 2023-06-13 RX ORDER — CEFAZOLIN SODIUM/WATER 2 G/20 ML
2 SYRINGE (ML) INTRAVENOUS
Status: COMPLETED | OUTPATIENT
Start: 2023-06-13 | End: 2023-06-13

## 2023-06-13 RX ORDER — LIDOCAINE 40 MG/G
CREAM TOPICAL
Status: DISCONTINUED | OUTPATIENT
Start: 2023-06-13 | End: 2023-06-19 | Stop reason: HOSPADM

## 2023-06-13 RX ORDER — KETOROLAC TROMETHAMINE 30 MG/ML
30 INJECTION, SOLUTION INTRAMUSCULAR; INTRAVENOUS EVERY 6 HOURS PRN
Status: DISCONTINUED | OUTPATIENT
Start: 2023-06-13 | End: 2023-06-13 | Stop reason: HOSPADM

## 2023-06-13 RX ORDER — HYDROMORPHONE HCL IN WATER/PF 6 MG/30 ML
0.4 PATIENT CONTROLLED ANALGESIA SYRINGE INTRAVENOUS
Status: DISCONTINUED | OUTPATIENT
Start: 2023-06-13 | End: 2023-06-19 | Stop reason: HOSPADM

## 2023-06-13 RX ORDER — PROCHLORPERAZINE MALEATE 10 MG
10 TABLET ORAL EVERY 6 HOURS PRN
Status: DISCONTINUED | OUTPATIENT
Start: 2023-06-13 | End: 2023-06-19 | Stop reason: HOSPADM

## 2023-06-13 RX ORDER — CEFAZOLIN SODIUM 2 G/100ML
2 INJECTION, SOLUTION INTRAVENOUS EVERY 8 HOURS
Status: COMPLETED | OUTPATIENT
Start: 2023-06-13 | End: 2023-06-14

## 2023-06-13 RX ORDER — TRANEXAMIC ACID 650 MG/1
1950 TABLET ORAL ONCE
Status: DISCONTINUED | OUTPATIENT
Start: 2023-06-13 | End: 2023-06-13 | Stop reason: HOSPADM

## 2023-06-13 RX ORDER — METFORMIN HCL 500 MG
2000 TABLET, EXTENDED RELEASE 24 HR ORAL
Status: DISCONTINUED | OUTPATIENT
Start: 2023-06-14 | End: 2023-06-19 | Stop reason: HOSPADM

## 2023-06-13 RX ORDER — BISACODYL 10 MG
10 SUPPOSITORY, RECTAL RECTAL DAILY PRN
Status: DISCONTINUED | OUTPATIENT
Start: 2023-06-13 | End: 2023-06-19 | Stop reason: HOSPADM

## 2023-06-13 RX ORDER — POLYETHYLENE GLYCOL 3350 17 G/17G
1 POWDER, FOR SOLUTION ORAL DAILY
Qty: 7 PACKET | Refills: 0 | Status: SHIPPED | OUTPATIENT
Start: 2023-06-13 | End: 2023-06-30

## 2023-06-13 RX ORDER — PROPOFOL 10 MG/ML
INJECTION, EMULSION INTRAVENOUS PRN
Status: DISCONTINUED | OUTPATIENT
Start: 2023-06-13 | End: 2023-06-13

## 2023-06-13 RX ORDER — ASPIRIN 81 MG/1
81 TABLET ORAL 2 TIMES DAILY
Qty: 60 TABLET | Refills: 0 | Status: SHIPPED | OUTPATIENT
Start: 2023-06-13 | End: 2024-03-05

## 2023-06-13 RX ORDER — METOPROLOL TARTRATE 1 MG/ML
INJECTION, SOLUTION INTRAVENOUS PRN
Status: DISCONTINUED | OUTPATIENT
Start: 2023-06-13 | End: 2023-06-13

## 2023-06-13 RX ORDER — TRANEXAMIC ACID 10 MG/ML
1 INJECTION, SOLUTION INTRAVENOUS ONCE
Status: COMPLETED | OUTPATIENT
Start: 2023-06-13 | End: 2023-06-13

## 2023-06-13 RX ORDER — AMOXICILLIN 250 MG
1-2 CAPSULE ORAL 2 TIMES DAILY
Qty: 30 TABLET | Refills: 0 | Status: SHIPPED | OUTPATIENT
Start: 2023-06-13 | End: 2023-06-30

## 2023-06-13 RX ORDER — LIDOCAINE HYDROCHLORIDE 20 MG/ML
INJECTION, SOLUTION INFILTRATION; PERINEURAL PRN
Status: DISCONTINUED | OUTPATIENT
Start: 2023-06-13 | End: 2023-06-13

## 2023-06-13 RX ORDER — METFORMIN HCL 500 MG
1000 TABLET, EXTENDED RELEASE 24 HR ORAL ONCE
Status: COMPLETED | OUTPATIENT
Start: 2023-06-13 | End: 2023-06-13

## 2023-06-13 RX ORDER — VASOPRESSIN IN 0.9 % NACL 2 UNIT/2ML
SYRINGE (ML) INTRAVENOUS PRN
Status: DISCONTINUED | OUTPATIENT
Start: 2023-06-13 | End: 2023-06-13

## 2023-06-13 RX ORDER — CALCIUM CHLORIDE 100 MG/ML
INJECTION INTRAVENOUS; INTRAVENTRICULAR PRN
Status: DISCONTINUED | OUTPATIENT
Start: 2023-06-13 | End: 2023-06-13

## 2023-06-13 RX ORDER — HYDROMORPHONE HCL IN WATER/PF 6 MG/30 ML
0.2 PATIENT CONTROLLED ANALGESIA SYRINGE INTRAVENOUS
Status: DISCONTINUED | OUTPATIENT
Start: 2023-06-13 | End: 2023-06-19 | Stop reason: HOSPADM

## 2023-06-13 RX ORDER — FENTANYL CITRATE 50 UG/ML
25 INJECTION, SOLUTION INTRAMUSCULAR; INTRAVENOUS EVERY 5 MIN PRN
Status: DISCONTINUED | OUTPATIENT
Start: 2023-06-13 | End: 2023-06-13 | Stop reason: HOSPADM

## 2023-06-13 RX ORDER — BUPIVACAINE HYDROCHLORIDE 2.5 MG/ML
INJECTION, SOLUTION INFILTRATION; PERINEURAL PRN
Status: DISCONTINUED | OUTPATIENT
Start: 2023-06-13 | End: 2023-06-13 | Stop reason: HOSPADM

## 2023-06-13 RX ORDER — ONDANSETRON 2 MG/ML
INJECTION INTRAMUSCULAR; INTRAVENOUS PRN
Status: DISCONTINUED | OUTPATIENT
Start: 2023-06-13 | End: 2023-06-13

## 2023-06-13 RX ORDER — OXYCODONE HYDROCHLORIDE 5 MG/1
5 TABLET ORAL EVERY 4 HOURS PRN
Status: DISCONTINUED | OUTPATIENT
Start: 2023-06-13 | End: 2023-06-14

## 2023-06-13 RX ORDER — HYDROMORPHONE HCL IN WATER/PF 6 MG/30 ML
0.4 PATIENT CONTROLLED ANALGESIA SYRINGE INTRAVENOUS EVERY 5 MIN PRN
Status: DISCONTINUED | OUTPATIENT
Start: 2023-06-13 | End: 2023-06-13 | Stop reason: HOSPADM

## 2023-06-13 RX ORDER — ONDANSETRON 2 MG/ML
4 INJECTION INTRAMUSCULAR; INTRAVENOUS EVERY 6 HOURS PRN
Status: DISCONTINUED | OUTPATIENT
Start: 2023-06-13 | End: 2023-06-19 | Stop reason: HOSPADM

## 2023-06-13 RX ORDER — ONDANSETRON 4 MG/1
4 TABLET, ORALLY DISINTEGRATING ORAL EVERY 6 HOURS PRN
Status: DISCONTINUED | OUTPATIENT
Start: 2023-06-13 | End: 2023-06-19 | Stop reason: HOSPADM

## 2023-06-13 RX ORDER — FENTANYL CITRATE 50 UG/ML
50 INJECTION, SOLUTION INTRAMUSCULAR; INTRAVENOUS EVERY 5 MIN PRN
Status: DISCONTINUED | OUTPATIENT
Start: 2023-06-13 | End: 2023-06-13 | Stop reason: HOSPADM

## 2023-06-13 RX ORDER — ACETAMINOPHEN 325 MG/1
650 TABLET ORAL EVERY 4 HOURS PRN
Status: DISCONTINUED | OUTPATIENT
Start: 2023-06-16 | End: 2023-06-19 | Stop reason: HOSPADM

## 2023-06-13 RX ORDER — METFORMIN HCL 500 MG
2000 TABLET, EXTENDED RELEASE 24 HR ORAL
Status: DISCONTINUED | OUTPATIENT
Start: 2023-06-13 | End: 2023-06-13

## 2023-06-13 RX ORDER — ONDANSETRON 2 MG/ML
4 INJECTION INTRAMUSCULAR; INTRAVENOUS EVERY 30 MIN PRN
Status: DISCONTINUED | OUTPATIENT
Start: 2023-06-13 | End: 2023-06-13 | Stop reason: HOSPADM

## 2023-06-13 RX ADMIN — NALOXONE HYDROCHLORIDE 0.4 MG: 1 INJECTION PARENTERAL at 13:40

## 2023-06-13 RX ADMIN — SODIUM CHLORIDE, SODIUM LACTATE, POTASSIUM CHLORIDE, CALCIUM CHLORIDE: 600; 310; 30; 20 INJECTION, SOLUTION INTRAVENOUS at 12:34

## 2023-06-13 RX ADMIN — SENNOSIDES AND DOCUSATE SODIUM 1 TABLET: 50; 8.6 TABLET ORAL at 20:32

## 2023-06-13 RX ADMIN — PHENYLEPHRINE HYDROCHLORIDE 100 MCG: 10 INJECTION INTRAVENOUS at 11:39

## 2023-06-13 RX ADMIN — LIDOCAINE HYDROCHLORIDE 30 MG: 20 INJECTION, SOLUTION INFILTRATION; PERINEURAL at 08:14

## 2023-06-13 RX ADMIN — PHENYLEPHRINE HYDROCHLORIDE 150 MCG: 10 INJECTION INTRAVENOUS at 12:24

## 2023-06-13 RX ADMIN — PHENYLEPHRINE HYDROCHLORIDE 150 MCG: 10 INJECTION INTRAVENOUS at 12:58

## 2023-06-13 RX ADMIN — CALCIUM CHLORIDE 500 MG: 100 INJECTION, SOLUTION INTRAVENOUS at 13:15

## 2023-06-13 RX ADMIN — SODIUM CHLORIDE, SODIUM LACTATE, POTASSIUM CHLORIDE, CALCIUM CHLORIDE: 600; 310; 30; 20 INJECTION, SOLUTION INTRAVENOUS at 11:26

## 2023-06-13 RX ADMIN — ASPIRIN 81 MG: 81 TABLET, COATED ORAL at 20:32

## 2023-06-13 RX ADMIN — Medication 1 UNITS: at 13:05

## 2023-06-13 RX ADMIN — DEXMEDETOMIDINE HYDROCHLORIDE 0.5 MCG/KG/HR: 100 INJECTION, SOLUTION INTRAVENOUS at 08:59

## 2023-06-13 RX ADMIN — METOPROLOL TARTRATE 1 MG: 5 INJECTION INTRAVENOUS at 09:17

## 2023-06-13 RX ADMIN — PHENYLEPHRINE HYDROCHLORIDE 150 MCG: 10 INJECTION INTRAVENOUS at 10:25

## 2023-06-13 RX ADMIN — HYDROMORPHONE HYDROCHLORIDE 0.5 MG: 1 INJECTION, SOLUTION INTRAMUSCULAR; INTRAVENOUS; SUBCUTANEOUS at 10:06

## 2023-06-13 RX ADMIN — MIDAZOLAM 2 MG: 1 INJECTION INTRAMUSCULAR; INTRAVENOUS at 08:08

## 2023-06-13 RX ADMIN — SUGAMMADEX 400 MG: 100 INJECTION, SOLUTION INTRAVENOUS at 12:50

## 2023-06-13 RX ADMIN — Medication 1 UNITS: at 12:18

## 2023-06-13 RX ADMIN — PHENYLEPHRINE HYDROCHLORIDE 100 MCG: 10 INJECTION INTRAVENOUS at 11:26

## 2023-06-13 RX ADMIN — HYDROMORPHONE HYDROCHLORIDE 0.25 MG: 1 INJECTION, SOLUTION INTRAMUSCULAR; INTRAVENOUS; SUBCUTANEOUS at 13:00

## 2023-06-13 RX ADMIN — Medication 1 UNITS: at 12:50

## 2023-06-13 RX ADMIN — HYDROMORPHONE HYDROCHLORIDE 0.5 MG: 1 INJECTION, SOLUTION INTRAMUSCULAR; INTRAVENOUS; SUBCUTANEOUS at 12:08

## 2023-06-13 RX ADMIN — SODIUM CHLORIDE, SODIUM LACTATE, POTASSIUM CHLORIDE, CALCIUM CHLORIDE: 600; 310; 30; 20 INJECTION, SOLUTION INTRAVENOUS at 10:30

## 2023-06-13 RX ADMIN — Medication 1 UNITS: at 13:08

## 2023-06-13 RX ADMIN — PHENYLEPHRINE HYDROCHLORIDE 100 MCG: 10 INJECTION INTRAVENOUS at 09:10

## 2023-06-13 RX ADMIN — ACETAMINOPHEN 975 MG: 325 TABLET, FILM COATED ORAL at 18:29

## 2023-06-13 RX ADMIN — Medication 1 UNITS: at 12:39

## 2023-06-13 RX ADMIN — SODIUM CHLORIDE, POTASSIUM CHLORIDE, SODIUM LACTATE AND CALCIUM CHLORIDE: 600; 310; 30; 20 INJECTION, SOLUTION INTRAVENOUS at 09:41

## 2023-06-13 RX ADMIN — PHENYLEPHRINE HYDROCHLORIDE 100 MCG: 10 INJECTION INTRAVENOUS at 13:10

## 2023-06-13 RX ADMIN — PHENYLEPHRINE HYDROCHLORIDE 100 MCG: 10 INJECTION INTRAVENOUS at 11:20

## 2023-06-13 RX ADMIN — HYDROMORPHONE HYDROCHLORIDE 0.5 MG: 1 INJECTION, SOLUTION INTRAMUSCULAR; INTRAVENOUS; SUBCUTANEOUS at 12:32

## 2023-06-13 RX ADMIN — PHENYLEPHRINE HYDROCHLORIDE 100 MCG: 10 INJECTION INTRAVENOUS at 08:39

## 2023-06-13 RX ADMIN — SODIUM CHLORIDE, POTASSIUM CHLORIDE, SODIUM LACTATE AND CALCIUM CHLORIDE: 600; 310; 30; 20 INJECTION, SOLUTION INTRAVENOUS at 16:31

## 2023-06-13 RX ADMIN — METFORMIN HYDROCHLORIDE 1000 MG: 500 TABLET, EXTENDED RELEASE ORAL at 18:28

## 2023-06-13 RX ADMIN — DEXMEDETOMIDINE HYDROCHLORIDE 0.5 MCG/KG/HR: 100 INJECTION, SOLUTION INTRAVENOUS at 11:00

## 2023-06-13 RX ADMIN — PHENYLEPHRINE HYDROCHLORIDE 150 MCG: 10 INJECTION INTRAVENOUS at 11:47

## 2023-06-13 RX ADMIN — Medication 0.5 UNITS: at 12:46

## 2023-06-13 RX ADMIN — KETOROLAC TROMETHAMINE 30 MG: 30 INJECTION, SOLUTION INTRAMUSCULAR at 15:30

## 2023-06-13 RX ADMIN — ONDANSETRON 4 MG: 2 INJECTION INTRAMUSCULAR; INTRAVENOUS at 12:00

## 2023-06-13 RX ADMIN — PHENYLEPHRINE HYDROCHLORIDE 100 MCG: 10 INJECTION INTRAVENOUS at 11:58

## 2023-06-13 RX ADMIN — HYDROMORPHONE HYDROCHLORIDE 0.25 MG: 1 INJECTION, SOLUTION INTRAMUSCULAR; INTRAVENOUS; SUBCUTANEOUS at 13:03

## 2023-06-13 RX ADMIN — HYDROMORPHONE HYDROCHLORIDE 1 MG: 1 INJECTION, SOLUTION INTRAMUSCULAR; INTRAVENOUS; SUBCUTANEOUS at 08:14

## 2023-06-13 RX ADMIN — Medication 2 UNITS: at 13:10

## 2023-06-13 RX ADMIN — Medication 2 UNITS: at 12:27

## 2023-06-13 RX ADMIN — CEFAZOLIN SODIUM 2 G: 2 INJECTION, SOLUTION INTRAVENOUS at 18:32

## 2023-06-13 RX ADMIN — PROPOFOL 50 MCG/KG/MIN: 10 INJECTION, EMULSION INTRAVENOUS at 08:15

## 2023-06-13 RX ADMIN — PHENYLEPHRINE HYDROCHLORIDE 150 MCG: 10 INJECTION INTRAVENOUS at 09:11

## 2023-06-13 RX ADMIN — DEXMEDETOMIDINE 12 MCG: 100 INJECTION, SOLUTION, CONCENTRATE INTRAVENOUS at 12:09

## 2023-06-13 RX ADMIN — SODIUM CHLORIDE, POTASSIUM CHLORIDE, SODIUM LACTATE AND CALCIUM CHLORIDE: 600; 310; 30; 20 INJECTION, SOLUTION INTRAVENOUS at 07:38

## 2023-06-13 RX ADMIN — TRANEXAMIC ACID 1 G: 10 INJECTION, SOLUTION INTRAVENOUS at 08:23

## 2023-06-13 RX ADMIN — Medication 2 G: at 08:09

## 2023-06-13 RX ADMIN — PHENYLEPHRINE HYDROCHLORIDE 100 MCG: 10 INJECTION INTRAVENOUS at 12:02

## 2023-06-13 RX ADMIN — ROCURONIUM BROMIDE 50 MG: 50 INJECTION, SOLUTION INTRAVENOUS at 09:40

## 2023-06-13 RX ADMIN — PHENYLEPHRINE HYDROCHLORIDE 100 MCG: 10 INJECTION INTRAVENOUS at 13:13

## 2023-06-13 RX ADMIN — ROCURONIUM BROMIDE 30 MG: 50 INJECTION, SOLUTION INTRAVENOUS at 11:02

## 2023-06-13 RX ADMIN — Medication 2 G: at 11:45

## 2023-06-13 RX ADMIN — PROPOFOL 200 MG: 10 INJECTION, EMULSION INTRAVENOUS at 08:14

## 2023-06-13 RX ADMIN — ROCURONIUM BROMIDE 100 MG: 50 INJECTION, SOLUTION INTRAVENOUS at 08:14

## 2023-06-13 RX ADMIN — PHENYLEPHRINE HYDROCHLORIDE 100 MCG: 10 INJECTION INTRAVENOUS at 11:07

## 2023-06-13 RX ADMIN — FENTANYL CITRATE 100 MCG: 50 INJECTION, SOLUTION INTRAMUSCULAR; INTRAVENOUS at 08:14

## 2023-06-13 RX ADMIN — PHENYLEPHRINE HYDROCHLORIDE 100 MCG: 10 INJECTION INTRAVENOUS at 10:46

## 2023-06-13 RX ADMIN — PHENYLEPHRINE HYDROCHLORIDE 100 MCG: 10 INJECTION INTRAVENOUS at 11:14

## 2023-06-13 ASSESSMENT — ACTIVITIES OF DAILY LIVING (ADL)
ADLS_ACUITY_SCORE: 22
ADLS_ACUITY_SCORE: 22
DRESSING/BATHING_DIFFICULTY: NO
TOILETING_ISSUES: NO
ADLS_ACUITY_SCORE: 20
DIFFICULTY_COMMUNICATING: NO
HEARING_DIFFICULTY_OR_DEAF: NO
ADLS_ACUITY_SCORE: 18
ADLS_ACUITY_SCORE: 22
FALL_HISTORY_WITHIN_LAST_SIX_MONTHS: NO
ADLS_ACUITY_SCORE: 22
DOING_ERRANDS_INDEPENDENTLY_DIFFICULTY: NO
EQUIPMENT_CURRENTLY_USED_AT_HOME: WALKER, STANDARD
TRANSFERRING: 0-->ASSISTANCE NEEDED (DEVELOPMENTALLY APPROPRIATE)
TRANSFERRING: 1-->ASSISTANCE (EQUIPMENT/PERSON) NEEDED
ADLS_ACUITY_SCORE: 20
DIFFICULTY_EATING/SWALLOWING: NO
CONCENTRATING,_REMEMBERING_OR_MAKING_DECISIONS_DIFFICULTY: NO
ADLS_ACUITY_SCORE: 22
CHANGE_IN_FUNCTIONAL_STATUS_SINCE_ONSET_OF_CURRENT_ILLNESS/INJURY: YES
WALKING_OR_CLIMBING_STAIRS_DIFFICULTY: NO
WEAR_GLASSES_OR_BLIND: NO
ADLS_ACUITY_SCORE: 20

## 2023-06-13 NOTE — BRIEF OP NOTE
[facility]    Brief Operative Note    Pre-operative diagnosis: Girdlestone  Post-operative diagnosis Same as pre-operative diagnosis    Procedure: Procedure(s):  Left total hip arthroplasty  Surgeon: Surgeon(s) and Role:     * Dann Orozco MD - Primary     * Carlin Lee PA-C - Assisting  Anesthesia: General   Estimated Blood Loss: 500 ml    Drains: Hemovac  Specimens:   ID Type Source Tests Collected by Time Destination   1 : LEFT HIP SYNOVIUM - CHECK FOR GOUT Tissue Hip, Left SURGICAL PATHOLOGY EXAM Dann Orozco MD 6/13/2023  9:31 AM    2 : left hip tissue Tissue Hip, Left SURGICAL PATHOLOGY EXAM Dann Orozco MD 6/13/2023 11:50 AM    A : left hip tissue for cultures Tissue Hip, Left AFB CULTURE AND STAIN NON BLOOD, ANAEROBIC BACTERIAL CULTURE ROUTINE, GRAM STAIN, FUNGAL OR YEAST CULTURE ROUTINE, AEROBIC BACTERIAL CULTURE ROUTINE Dann Orozco MD 6/13/2023 11:48 AM      Findings:   None.  Complications: None.  Implants:   Implant Name Type Inv. Item Serial No.  Lot No. LRB No. Used Action   [place] Gription Acetabular Shell Sector, 54mm OD    Depuy [id number] Left 1 Implanted   IMP SCR BONE CAN ACE 6.5X30MM [id number] - [id number] Metallic Hardware/[place] IMP SCR BONE CAN ACE 6.5X30MM 1217-  [employer]- [id number] Left 1 Implanted   APEX Hole Eliminator PS    Depuy [id number] Left 1 Implanted   IMP STEM FEM HIP DEPUY CORAIL TPR SZ 12 STD OFFSET [id number] - [id number] Total Joint Component/Insert IMP STEM FEM HIP DEPUY CORAIL TPR SZ 12 STD OFFSET [id number]  [employer]- [id number] Left 1 Implanted   IMP INSERT HIP DEPUY PINNACLE ALTRX [id number] 1221- - [id number] Total Joint Component/Insert IMP INSERT HIP DEPUY PINNACLE ALTRX [id number] 1221-  [employer]- [id number] Left 1 Implanted   M-SPEC Metal Femoral Head, 36mm, +1.5, 12/14 Taper    Depuy [id number] Left 1 Implanted

## 2023-06-13 NOTE — ANESTHESIA CARE TRANSFER NOTE
Patient: Cora Senior    Procedure: Procedure(s):  Left total hip arthroplasty       Diagnosis: Primary osteoarthritis of left hip [M16.12]  Diagnosis Additional Information: No value filed.    Anesthesia Type:   General     Note:    Oropharynx: nasal airway in place  Level of Consciousness: drowsy  Oxygen Supplementation: face mask  Level of Supplemental Oxygen (L/min / FiO2): 6  Independent Airway: airway patency satisfactory and stable  Dentition: dentition unchanged  Vital Signs Stable: post-procedure vital signs reviewed and stable  Report to RN Given: handoff report given  Patient transferred to: PACU  Comments: Pt home CPAP machine requested to be applioed  Handoff Report: Identifed the Patient, Identified the Reponsible Provider, Reviewed the pertinent medical history, Discussed the surgical course, Reviewed Intra-OP anesthesia mangement and issues during anesthesia, Set expectations for post-procedure period and Allowed opportunity for questions and acknowledgement of understanding      Vitals:  Vitals Value Taken Time   BP 98/66 06/13/23 1325   Temp     Pulse 89 06/13/23 1330   Resp 17 06/13/23 1330   SpO2 86 % 06/13/23 1330   Vitals shown include unvalidated device data.    Electronically Signed By: ROBERT Leyva CRNA  June 13, 2023  1:31 PM

## 2023-06-13 NOTE — OR NURSING
Patient presented to PACU with a nasal trumpet and O2 in the low 90s. Quickly the patient could not maintain oxygen saturations. Jaw thrust was performed O2 was increased to 15L and MDA was notified. O2 saturations were maintaining in the mid 80s. 0.4 narcan was given by MDA. Oxygenation improved.

## 2023-06-13 NOTE — PROGRESS NOTES
06/13/23 8850   Appointment Info   Signing Clinician's Name / Credentials (PT) Rica Mercedes, PT   Rehab Comments (PT) L hip anterolateral approach   Quick Adds   Quick Adds Certification   Living Environment   People in Home spouse;child(juliette), dependent   Current Living Arrangements apartment   Home Accessibility stairs to enter home   Number of Stairs, Main Entrance 7   Stair Railings, Main Entrance railings safe and in good condition   Transportation Anticipated family or friend will provide   Self-Care   Usual Activity Tolerance moderate   Current Activity Tolerance poor   Equipment Currently Used at Home none  (has a walker per his report)   Fall history within last six months no   Activity/Exercise/Self-Care Comment normally independent without walker   General Information   Onset of Illness/Injury or Date of Surgery 06/13/23   Referring Physician Dr. Dann Orozco/Carlin Lee PA-C   Patient/Family Therapy Goals Statement (PT) return home with assist from spouse   Pertinent History of Current Problem (include personal factors and/or comorbidities that impact the POC) per chart: patient seen POD #0 s/p L IVONNE anterolateral approach; see medical record for further information   Existing Precautions/Restrictions fall;no hip ADD past midline;no hip ER  (no figure of 4)   Weight-Bearing Status - LLE weight-bearing as tolerated   General Observations dizziness; patient sleepy during session; low BP   Cognition   Affect/Mental Status (Cognition) low arousal/lethargic   Cognitive Status Comments appears intact when alert   Pain Assessment   Patient Currently in Pain Yes, see Vital Sign flowsheet  (2/10 at rest)   Integumentary/Edema   Integumentary/Edema Comments L hip incision and drain   Range of Motion (ROM)   ROM Comment L LE limited by hip precautions, pain, recent surgery; others appear WFL   Strength (Manual Muscle Testing)   Strength Comments L LE limited by recent surgery and pain; functional  weakness during mobility   Bed Mobility   Comment, (Bed Mobility) mod A for L LE with HOB elevated   Transfers   Comment, (Transfers) unable to tolerate standing this date secondary to dizziness and low BP   Gait/Stairs (Locomotion)   Distance in Feet 0   Comment, (Gait/Stairs) unable to tolerate secondary to dizziness and low BP   Balance   Balance Comments intact sitting balance   Clinical Impression   Criteria for Skilled Therapeutic Intervention Yes, treatment indicated   PT Diagnosis (PT) impaired functional mobility   Influenced by the following impairments pain; dizziness; lethargy; decreased L hip ROM/strength; hip precautions   Functional limitations due to impairments impaired independence with mobility and cares secondary to above deficits   Clinical Presentation (PT Evaluation Complexity) Stable/Uncomplicated   Clinical Presentation Rationale clinical judgement; level of assist   Clinical Decision Making (Complexity) low complexity   Planned Therapy Interventions (PT) bed mobility training;gait training;stair training;ROM (range of motion);strengthening;transfer training   Anticipated Equipment Needs at Discharge (PT) walker, rolling   Risk & Benefits of therapy have been explained evaluation/treatment results reviewed;care plan/treatment goals reviewed;risks/benefits reviewed;current/potential barriers reviewed;participants voiced agreement with care plan;participants included;patient   PT Total Evaluation Time   PT Eval, Low Complexity Minutes (82671) 10   Plan of Care Review   Plan of Care Reviewed With patient;other (see comments)  (Nurse)   Therapy Certification   Start of care date 06/13/23   Certification date from 06/13/23   Certification date to 06/14/23   Medical Diagnosis L IVONNE   Physical Therapy Goals   PT Frequency Daily   PT Predicted Duration/Target Date for Goal Attainment 06/14/23   PT Goals Bed Mobility;Transfers;Gait;Stairs   PT: Bed Mobility Supine to/from sit;Within  precautions;Supervision/stand-by assist   PT: Transfers Supervision/stand-by assist;Sit to/from stand;Bed to/from chair;Assistive device;Within precautions   PT: Gait Supervision/stand-by assist;Rolling walker   PT: Stairs 7 stairs;Assistive device  (CGA with rail/AD)   PT Discharge Planning   PT Discharge Recommendation (DC Rec)   (defer to Ortho)   PT Rationale for DC Rec Anticipate once dizziness and low BP are resolved, patient should be safe to discharge to home with assist of family; has own walker and reports spouse has taken off 2 weeks to assist him   PT Brief overview of current status A x 1 to sit at EOB; limited by dizziness and low BP

## 2023-06-13 NOTE — PLAN OF CARE
[facility]  OUTPATIENT PHYSICAL THERAPY EVALUATION  PLAN OF TREATMENT FOR OUTPATIENT REHABILITATION  (COMPLETE FOR INITIAL CLAIMS ONLY)  Patient's Last Name, First Name, M.I.  YOB: 1980  Cora Senior  Vinod                        Provider's Name  [facility] Medical Record No.  [id number]                             Onset Date:  06/13/23   Start of Care Date:  06/13/23   Type:     _X_PT   ___OT   ___SLP Medical Diagnosis:  L IVONNE              PT Diagnosis:  impaired functional mobility Visits from SOC:  1     See note for plan of treatment, functional goals and certification details    I CERTIFY THE NEED FOR THESE SERVICES FURNISHED UNDER        THIS PLAN OF TREATMENT AND WHILE UNDER MY CARE     (Physician co-signature of this document indicates review and certification of the therapy plan).

## 2023-06-13 NOTE — ANESTHESIA PROCEDURE NOTES
Airway       Patient location during procedure: OR       Procedure Start/Stop Times: 6/13/2023 8:17 AM  Staff -        Anesthesiologist:  Abdulkadir Julien MD       Performed By: anesthesiologist  Consent for Airway        Urgency: elective  Indications and Patient Condition       Indications for airway management: genna-procedural       Induction type:intravenous       Mask difficulty assessment: 1 - vent by mask    Final Airway Details       Final airway type: endotracheal airway       Successful airway: ETT - single and Oral  Endotracheal Airway Details        ETT size (mm): 8.0       Cuffed: yes       Successful intubation technique: direct laryngoscopy       DL Blade Type: Faria 2       Grade View of Cords: 2       Adjucts: stylet       Position: Right       Measured from: gums/teeth       Secured at (cm): 24       Bite block used: None    Post intubation assessment        Placement verified by: capnometry, equal breath sounds and chest rise        Number of attempts at approach: 1       Number of other approaches attempted: 0       Secured with: plastic tape       Ease of procedure: easy       Dentition: Intact and Unchanged    Medication(s) Administered   Medication Administration Time: 6/13/2023 8:17 AM
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
## 2023-06-13 NOTE — OP NOTE
Summary: L IVONNE anterolateral approach, Depuy 54 [place] sector with gription, apex hole eliminator, 36 x 54 neutral altrx poly, Corail Collared 12 , 36 + 1.5                                                Preop Dx:  L hip arthropathy, sp girdlestone, probable gout  Postop Dx:  Probable inflammatory arthropathy L hip hx girdlestone  Procedure:  L IVONNE, anterolateral approach, Depuy 54 [place] sector with gription, apex hole eliminator, 36 x 54 neutral altrx poly, Corail Collared 12 , 36 + 1.5 metal head     Attending:    Assist: Shoaib Salvador DO        Indications for procedure:  Pt with L hip pain over the last few years and short extremity after girdlestone.  Now Difficulty with ambulation.  Now progressive exacerbation of disability over time..     Findings at procedure:  Extremity about 5 cm short.  Upon incision of periacetabular scar, expressed clear synovial fluid.  Capsular tissue was laden with crystals.  We sent for crystalline analysis, then microbiology for aerobes/anaerobes/afb/fungal/ gm stain culture and sensitivity .We did send acetabular synovium as well as reamings once we decided we did not need them for graft. Acetabulum with significant osteophytes laterally. Bone adequate cephalad/ anterior and posterior and deficient medially.    Depuy 54 [place] Sector with Gription, neutral altrx poly acetabular component, apex hole eliminator,  12 Corail stem with collar, 36 +1.5 femoral head.  Hip stable through range of motion all four quadrants with final implants. Images suggested equal leg length we were able to fully seat collar and had good fit in metaphyseal and diaphyseal cortical bone.  Overall Leg length equal when examined postop and under fluoro. Postop images show adequate orientation of components with acetabular index of acetabular component acceptable.     Procedure:  Following verbal and written consent, patient taken to OR 08.  Placed supine with bump  under L hip after general anesthesia with LMA.  Sterile prep and drape L lower extremity.  Curvilinear incision over anterior greater trochanter about 20 cm to include an old anterolateral incision.  He has multiple incisions.  We excised the old anterolateral scar .  Dissection carried down sharply to interval between tensor fascia dorothy and gluteus medius after incision through IT band/gluteus kishan fascia. There was abundant scar.   We did T the gluteus medius due to musculature and we could retract laterally and did release the more deep fibers, mostly about the insertion site of femoral component.  Capsule was identified and noted to be laden with crystals.  We sent this for crystalline analysis and saved enough capsule for solid closure at end of case, and a trapezoidal flap with a medial base was developed.  Simpulse lavage irrigation was used throughout the case.  Electrocautery for hemostasis.  The capsule was somewhat matted down to the femoral neck transection site and filled the acetabular remnant and there was synovitis.  We elevated in a sub periosteal fashion and subcapsular.  Dissection was carried to superiorlateral acetabular margin cephalad and lesser trochanter caudad.  Capsule was retracted medially.  We debulked the area removing acetabular osteophytes laterally.  We left the neck as long as possible and did not cut this, we just elevated capsule/scar about this. Femoral neck transected with recip saw minimally to freshen and flatten.  Reamings saved if needed and at the end of the case sent for microbiology and pathology..  We used acetabulum as template and excised redundant capsule and labrum.  We used currette to remove debris and bone tissue down to medial wall in a conical fashion.  We took quite a while to carve out the original acetabulum.  We saved all acetabular bone. We then reamed from 43 sequentially to 53 carefully not medializing but primarily cephalad and widening. WE  established depth with 43 and subsequently reamed with emphasis cephalad. Bone adequate and medial inner cortex preserved.  54 pinnacle sector with gription placed carefully under fluoro.  This appeared to be well seated.We placed one 30 mm screw cephalad.   We then placed a neutral trial liner.  We then removed and replaced zelpis with leg placed in figure of 4 position to approach proximal femur.  We accepted the neck cut and prepared for broaching. We used an osteotome to open lateral posterior much like a box osteotome.  We also used a canal finder.   We started with the chili pepper broach and then 8 broach and sequentially increased.  We used imaging throughout procedure for positioning and placement and make sure there were no complications. We placed the 12 corail broach ultimately with good fit and were able to fully seat with adequate diaphyseal fit as well and then placed the trial 36+1.5 head.    There appeared to be good fixation of the cup.  Reduction was quite difficult as tissues were quite contracted.  Leg length appeared to be equal physically and under fluoro.  Hip was stable in all four quadrants with motion.  All trial components were removed carefully with bone hook.  We checked for micromotion of stem and found this to be torsionally stable. good fit and stability with torsion and anterior posterior testing.  We then placed final components using apex hole eliminator, 54 neutral poly altrx liner with good fit, we then placed 12 Corail standard collared stem with standard neck.  36+1.5 metal head impacted on stem and hip reduced. Reduction was quite difficult.  Once reduced, Leg length equal physically and under fluoro.  Stable through entire range of motion.  We then placed sterile betadine solution for 3 minutes.  This was then irrigated and capsule closed with #2 fiber wire.  Over medium hemovac drain we closed gluteus medius tensor fascia dorothy interval with #1 vicryl in a running fashion.   IT band/gluteal fascia interval closed with running #1 vicryl followed by #1 stratofix for water tight seal.  Deep subcutaneous fat layer, intermediate, superficial closed sequentially with #1 vicryl, 0 vicryl, 2-0 vicryl in interrupted fashion.  Epithelium closed with running suibcuticular 3-0 monocryl.  Wound sealed with dermabond.  Xeroform followed by Aquacel placed.  Drain taped in place.  Marcaine also injected in and about wound.  Patient placed in abduction pillow.  Leg length equal post op on table. Sciatic nerve functioned well.  No complications identified.        The patient was then awoken from anesthesia without any complications.  The patient was then transferred onto the hospital bed and taken to the PACU without any complications.  Multiple sponge and needle counts were performed and were correct at the end of the case.  Dr. Orozco was present and participated throughout the procedure.     Postoperative plan:  WBAT  Anterior hip precautions L hip  Postop XR in PACU  Postop abx  Pain control  F/U drain output  DVT ppx - aspirin  PT/OT, OOB  Discharge planning           I was present entire procedure.  NO complications were encountered.  Leg length was equal under fluoro exam.  Hip stable in all 4 quadrants.  Depuy pinnacle sector with gription 54, altrx neutral 36/54, apex hole eliminator, 12 Corail collared stem.  36 + 1.5 metal head.    Post op images show anatomic orientation of components no complications identified..  I spoke with patient's wife and patient postop.    Dann Orozco MD

## 2023-06-13 NOTE — ANESTHESIA POSTPROCEDURE EVALUATION
Patient: Cora Senior    Procedure: Procedure(s):  Left total hip arthroplasty       Anesthesia Type:  General    Note:  Disposition: Admission   Postop Pain Control: Uneventful            Sign Out: Well controlled pain   PONV: No   Neuro/Psych: Uneventful            Sign Out: Acceptable/Baseline neuro status   Airway/Respiratory: Uneventful            Sign Out: Acceptable/Baseline resp. status   CV/Hemodynamics: Uneventful            Sign Out: Acceptable CV status; No obvious hypovolemia; No obvious fluid overload   Other NRE: NONE   DID A NON-ROUTINE EVENT OCCUR? No           Last vitals:  Vitals Value Taken Time   BP 91/62 06/13/23 1440   Temp 97.6  F (36.4  C) 06/13/23 1430   Pulse 104 06/13/23 1449   Resp 10 06/13/23 1449   SpO2 92 % 06/13/23 1449   Vitals shown include unvalidated device data.    Electronically Signed By: Abdulkadir Julien MD  June 13, 2023  2:50 PM

## 2023-06-13 NOTE — PROGRESS NOTES
SPIRITUAL HEALTH SERVICES Progress Note  RH Pre-Op    Responded to a pre-surgical request and saw pt Thet Vinod Senior before his procedure.  However, Thet was unfamiliar with the role of a .  Oriented him to [facility].  Thet expressed no needs.  He reported that his spouse and son were with him but had stepped away.  He shared that Spiritism or spirituality is not part of his self-care.    Plan: Informed pt how he can request  support once he is admitted to the floor, in case his needs change.  This author and other chaplains remain available per pt/family request.    Donnie Garcia M.Div., [facility]  Staff     [facility] routine referrals *[phone]  [facility] available 24/7 for emergent requests/referrals, either by paging the on-call  or by entering an ASAP/STAT consult in Epic (this will also page the on-call ).

## 2023-06-14 ENCOUNTER — APPOINTMENT (OUTPATIENT)
Dept: PHYSICAL THERAPY | Facility: CLINIC | Age: 43
End: 2023-06-14
Attending: ORTHOPAEDIC SURGERY
Payer: COMMERCIAL

## 2023-06-14 LAB
ANION GAP SERPL CALCULATED.3IONS-SCNC: 9 MMOL/L (ref 7–15)
BUN SERPL-MCNC: 6.8 MG/DL (ref 6–20)
CALCIUM SERPL-MCNC: 8.3 MG/DL (ref 8.6–10)
CHLORIDE SERPL-SCNC: 101 MMOL/L (ref 98–107)
CREAT SERPL-MCNC: 0.87 MG/DL (ref 0.67–1.17)
DEPRECATED HCO3 PLAS-SCNC: 25 MMOL/L (ref 22–29)
FASTING STATUS PATIENT QL REPORTED: YES
FIBRINOGEN PPP-MCNC: 504 MG/DL (ref 170–490)
FOLATE SERPL-MCNC: 4.4 NG/ML (ref 4.6–34.8)
GFR SERPL CREATININE-BSD FRML MDRD: >90 ML/MIN/1.73M2
GLUCOSE BLDC GLUCOMTR-MCNC: 133 MG/DL (ref 70–99)
GLUCOSE BLDC GLUCOMTR-MCNC: 163 MG/DL (ref 70–99)
GLUCOSE BLDC GLUCOMTR-MCNC: 169 MG/DL (ref 70–99)
GLUCOSE SERPL-MCNC: 173 MG/DL (ref 70–99)
GLUCOSE SERPL-MCNC: 183 MG/DL (ref 70–99)
HGB BLD-MCNC: 10.4 G/DL (ref 13.3–17.7)
HGB BLD-MCNC: 9.3 G/DL (ref 13.3–17.7)
IRON BINDING CAPACITY (ROCHE): 215 UG/DL (ref 240–430)
IRON SATN MFR SERPL: 10 % (ref 15–46)
IRON SERPL-MCNC: 22 UG/DL (ref 61–157)
MAGNESIUM SERPL-MCNC: 1.5 MG/DL (ref 1.7–2.3)
PATH REPORT.COMMENTS IMP SPEC: NORMAL
PATH REPORT.COMMENTS IMP SPEC: NORMAL
PATH REPORT.FINAL DX SPEC: NORMAL
PATH REPORT.GROSS SPEC: NORMAL
PATH REPORT.INTRAOP OBS SPEC DOC: NORMAL
PATH REPORT.MICROSCOPIC SPEC OTHER STN: NORMAL
PATH REPORT.RELEVANT HX SPEC: NORMAL
PHOSPHATE SERPL-MCNC: 2.6 MG/DL (ref 2.5–4.5)
PHOTO IMAGE: NORMAL
POTASSIUM SERPL-SCNC: 4.3 MMOL/L (ref 3.4–5.3)
RETICS # AUTO: 0.1 10E6/UL (ref 0.03–0.1)
RETICS/RBC NFR AUTO: 2.7 % (ref 0.5–2)
SODIUM SERPL-SCNC: 135 MMOL/L (ref 136–145)
VIT B12 SERPL-MCNC: 304 PG/ML (ref 232–1245)

## 2023-06-14 PROCEDURE — 99222 1ST HOSP IP/OBS MODERATE 55: CPT | Performed by: INTERNAL MEDICINE

## 2023-06-14 PROCEDURE — 250N000011 HC RX IP 250 OP 636: Performed by: ORTHOPAEDIC SURGERY

## 2023-06-14 PROCEDURE — 97110 THERAPEUTIC EXERCISES: CPT | Mod: GP | Performed by: PHYSICAL THERAPIST

## 2023-06-14 PROCEDURE — 258N000003 HC RX IP 258 OP 636: Performed by: PHYSICIAN ASSISTANT

## 2023-06-14 PROCEDURE — 250N000011 HC RX IP 250 OP 636: Performed by: PHYSICIAN ASSISTANT

## 2023-06-14 PROCEDURE — 250N000013 HC RX MED GY IP 250 OP 250 PS 637: Performed by: PHYSICIAN ASSISTANT

## 2023-06-14 PROCEDURE — 83550 IRON BINDING TEST: CPT | Performed by: INTERNAL MEDICINE

## 2023-06-14 PROCEDURE — 97530 THERAPEUTIC ACTIVITIES: CPT | Mod: GP | Performed by: PHYSICAL THERAPIST

## 2023-06-14 PROCEDURE — 258N000003 HC RX IP 258 OP 636: Performed by: INTERNAL MEDICINE

## 2023-06-14 PROCEDURE — 85018 HEMOGLOBIN: CPT | Performed by: PHYSICIAN ASSISTANT

## 2023-06-14 PROCEDURE — 85384 FIBRINOGEN ACTIVITY: CPT | Performed by: INTERNAL MEDICINE

## 2023-06-14 PROCEDURE — 82962 GLUCOSE BLOOD TEST: CPT

## 2023-06-14 PROCEDURE — 93010 ELECTROCARDIOGRAM REPORT: CPT | Performed by: INTERNAL MEDICINE

## 2023-06-14 PROCEDURE — 250N000013 HC RX MED GY IP 250 OP 250 PS 637: Performed by: INTERNAL MEDICINE

## 2023-06-14 PROCEDURE — 82947 ASSAY GLUCOSE BLOOD QUANT: CPT | Performed by: ORTHOPAEDIC SURGERY

## 2023-06-14 PROCEDURE — 36415 COLL VENOUS BLD VENIPUNCTURE: CPT | Performed by: INTERNAL MEDICINE

## 2023-06-14 PROCEDURE — 85018 HEMOGLOBIN: CPT | Performed by: INTERNAL MEDICINE

## 2023-06-14 PROCEDURE — 93005 ELECTROCARDIOGRAM TRACING: CPT

## 2023-06-14 PROCEDURE — 85045 AUTOMATED RETICULOCYTE COUNT: CPT | Performed by: INTERNAL MEDICINE

## 2023-06-14 PROCEDURE — 82746 ASSAY OF FOLIC ACID SERUM: CPT | Performed by: INTERNAL MEDICINE

## 2023-06-14 PROCEDURE — 250N000013 HC RX MED GY IP 250 OP 250 PS 637: Performed by: ORTHOPAEDIC SURGERY

## 2023-06-14 PROCEDURE — 83735 ASSAY OF MAGNESIUM: CPT | Performed by: INTERNAL MEDICINE

## 2023-06-14 PROCEDURE — 82947 ASSAY GLUCOSE BLOOD QUANT: CPT | Performed by: INTERNAL MEDICINE

## 2023-06-14 PROCEDURE — 36415 COLL VENOUS BLD VENIPUNCTURE: CPT | Performed by: ORTHOPAEDIC SURGERY

## 2023-06-14 PROCEDURE — 82607 VITAMIN B-12: CPT | Performed by: INTERNAL MEDICINE

## 2023-06-14 PROCEDURE — 84100 ASSAY OF PHOSPHORUS: CPT | Performed by: ORTHOPAEDIC SURGERY

## 2023-06-14 RX ORDER — HYDROMORPHONE HYDROCHLORIDE 2 MG/1
2-4 TABLET ORAL
Status: DISCONTINUED | OUTPATIENT
Start: 2023-06-14 | End: 2023-06-19 | Stop reason: HOSPADM

## 2023-06-14 RX ORDER — NICOTINE POLACRILEX 4 MG
15-30 LOZENGE BUCCAL
Status: DISCONTINUED | OUTPATIENT
Start: 2023-06-14 | End: 2023-06-19 | Stop reason: HOSPADM

## 2023-06-14 RX ORDER — MAGNESIUM SULFATE HEPTAHYDRATE 40 MG/ML
4 INJECTION, SOLUTION INTRAVENOUS ONCE
Status: COMPLETED | OUTPATIENT
Start: 2023-06-14 | End: 2023-06-14

## 2023-06-14 RX ORDER — DEXTROSE MONOHYDRATE 25 G/50ML
25-50 INJECTION, SOLUTION INTRAVENOUS
Status: DISCONTINUED | OUTPATIENT
Start: 2023-06-14 | End: 2023-06-19 | Stop reason: HOSPADM

## 2023-06-14 RX ADMIN — SODIUM CHLORIDE 500 ML: 9 INJECTION, SOLUTION INTRAVENOUS at 10:35

## 2023-06-14 RX ADMIN — ALLOPURINOL 400 MG: 300 TABLET ORAL at 08:17

## 2023-06-14 RX ADMIN — SODIUM CHLORIDE, POTASSIUM CHLORIDE, SODIUM LACTATE AND CALCIUM CHLORIDE: 600; 310; 30; 20 INJECTION, SOLUTION INTRAVENOUS at 15:32

## 2023-06-14 RX ADMIN — SENNOSIDES AND DOCUSATE SODIUM 1 TABLET: 50; 8.6 TABLET ORAL at 08:18

## 2023-06-14 RX ADMIN — SODIUM CHLORIDE 1000 ML: 9 INJECTION, SOLUTION INTRAVENOUS at 13:12

## 2023-06-14 RX ADMIN — ACETAMINOPHEN 975 MG: 325 TABLET, FILM COATED ORAL at 18:46

## 2023-06-14 RX ADMIN — HYDROXYZINE HYDROCHLORIDE 25 MG: 25 TABLET, FILM COATED ORAL at 17:24

## 2023-06-14 RX ADMIN — LISINOPRIL 2.5 MG: 2.5 TABLET ORAL at 08:17

## 2023-06-14 RX ADMIN — OXYCODONE HYDROCHLORIDE 5 MG: 5 TABLET ORAL at 08:18

## 2023-06-14 RX ADMIN — SENNOSIDES AND DOCUSATE SODIUM 1 TABLET: 50; 8.6 TABLET ORAL at 21:27

## 2023-06-14 RX ADMIN — CEFAZOLIN SODIUM 2 G: 2 INJECTION, SOLUTION INTRAVENOUS at 03:16

## 2023-06-14 RX ADMIN — MAGNESIUM SULFATE HEPTAHYDRATE 4 G: 40 INJECTION, SOLUTION INTRAVENOUS at 21:33

## 2023-06-14 RX ADMIN — METFORMIN HYDROCHLORIDE 2000 MG: 500 TABLET, EXTENDED RELEASE ORAL at 17:24

## 2023-06-14 RX ADMIN — HYDROMORPHONE HYDROCHLORIDE 2 MG: 2 TABLET ORAL at 17:24

## 2023-06-14 RX ADMIN — B-COMPLEX W/ C & FOLIC ACID TAB 1 TABLET: TAB at 18:46

## 2023-06-14 RX ADMIN — ACETAMINOPHEN 975 MG: 325 TABLET, FILM COATED ORAL at 03:13

## 2023-06-14 RX ADMIN — ACETAMINOPHEN 975 MG: 325 TABLET, FILM COATED ORAL at 11:51

## 2023-06-14 RX ADMIN — ASPIRIN 81 MG: 81 TABLET, COATED ORAL at 21:27

## 2023-06-14 RX ADMIN — HYDROXYZINE HYDROCHLORIDE 25 MG: 25 TABLET, FILM COATED ORAL at 08:18

## 2023-06-14 RX ADMIN — HYDROMORPHONE HYDROCHLORIDE 2 MG: 2 TABLET ORAL at 13:38

## 2023-06-14 RX ADMIN — ASPIRIN 81 MG: 81 TABLET, COATED ORAL at 08:17

## 2023-06-14 RX ADMIN — POTASSIUM & SODIUM PHOSPHATES POWDER PACK 280-160-250 MG 1 PACKET: 280-160-250 PACK at 21:27

## 2023-06-14 RX ADMIN — OXYCODONE HYDROCHLORIDE 5 MG: 5 TABLET ORAL at 03:15

## 2023-06-14 ASSESSMENT — ACTIVITIES OF DAILY LIVING (ADL)
ADLS_ACUITY_SCORE: 21

## 2023-06-14 NOTE — PROGRESS NOTES
"SPIRITUAL HEALTH SERVICES  SPIRITUAL ASSESSMENT Progress Note  [facility]. Unit 6 ortho     REFERRAL SOURCE: Advance Care Planning consult    Brief visit with Thet who stated he is \"feeling queasy\" this morning and preferred to rest.   I provided Health Care Directive documents for pt to review and will follow up later this afternoon at pt request.    Ellie Corado MDiv  Staff   [facility] routine referrals *[phone]  [facility] available 24/7 for emergent requests/referrals, either by having the on-call  paged or by entering an ASAP/STAT consult in Epic (this will also page the on-call ).    "

## 2023-06-14 NOTE — PLAN OF CARE
Goal Outcome Evaluation:      Plan of Care Reviewed With: patient    Overall Patient Progress: improving    Patient vital signs are at baseline: No,  Reason:  Pt hypotensive overnight. Tachycardic while standing at bedside.   Patient able to ambulate as they were prior to admission or with assist devices provided by therapies during their stay:  No,  Reason:  Pt only able to tolerate standing at side of bed.   Patient MUST void prior to discharge:  No,  Reason:  Malhotra discontinued at 0545. Due to void  Patient able to tolerate oral intake:  Yes  Pain has adequate pain control using Oral analgesics:  Yes  Does patient have an identified :  Yes, pt spouse   Has goal D/C date and time been discussed with patient:  Yes     Pt is alert and oriented x 4. Hypotensive with SBP in 90's. Tachycardic overnight. Only able to tolerate standing at bedside at this time. Pain managed with PRN Oxycodone and scheduled Tylenol. CMS intact. Malhotra catheter removed at 0545. Due to void. Tolerating regular diet. No nausea or vomiting. Hemovac drain in place- 25 ml output.

## 2023-06-14 NOTE — PLAN OF CARE
Goal Outcome Evaluation:      Plan of Care Reviewed With: patient, spouse    Overall Patient Progress: no change    Patient vital signs are at baseline: No,  Reason:  Tachycardic, soft BP's. Given fluid bolus  Patient able to ambulate as they were prior to admission or with assist devices provided by therapies during their stay:  No,  Reason:  Has not been able to work with PT due to dizziness  Patient MUST void prior to discharge:  Yes  Patient able to tolerate oral intake:  Yes  Pain has adequate pain control using Oral analgesics:  Yes, switched to PO Dilaudid with good pain relief  Does patient have an identified :  Yes  Has goal D/C date and time been discussed with patient:  Yes    A & Ox4, able to make needs known. Tolerating diet, decreased appetite this shift. Voiding adequately. Tachycardic, soft BP's, gave fluid bolus. QID/HS bs. Labs ordered for 1800. Is on Mag, K+. Phos protocols. Blood consent in chart.
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
## 2023-06-14 NOTE — PROGRESS NOTES
[facility]  Orthopedics Progress Note             Interval History:     43 year old male admitted postoperatively for elective conversion to Left IVONNE  with Dr. Erasmo Orozco due to DJD unresponsive to non operative treatment.  There were no intraoperative complications.  Patient currently Post op day #1.    Patient reports pain severe at left hip into thigh, but did sleep couple hours at a time.  Pt without radicular symptoms,  and sensation/motor intact to LLE.  Malhotra removed and has voided and eaten.  Has not been out of bed this morning due to hypotension.  Understands no leg crossing/figure of 4 positions.  Has full flight of stairs at home.  Wife present.  No other concerns at this time.    Pain: evolving.  Nausea: none.  Light headedness : none  Chest pain: none  Shortness of breath: none              Assessment and Plan:     S/P Left IVONNE, day #1.  Hypotensive, acute blood loss anemia, mild tachycardia.  Slow to progress functionally.    Plan:    Fluids, change oral pain mediation to Dilaudid.  Hospitalist and PT/OT input appreciated.      Due to progress, likely to stay another day.                    Physical Exam:   Patient Vitals for the past 12 hrs:   BP Temp Temp src Pulse Resp SpO2   06/14/23 0941 110/67 -- -- (!) 131 -- --   06/14/23 0935 100/65 -- -- (!) 128 -- --   06/14/23 0931 108/70 -- -- (!) 142 -- --   06/14/23 0923 117/66 -- -- (!) 137 -- --   06/14/23 0908 109/69 -- -- (!) 126 -- --   06/14/23 0635 98/61 98.4  F (36.9  C) Temporal (!) 121 23 96 %   06/14/23 0324 110/62 99.4  F (37.4  C) Temporal (!) 124 20 98 %   06/13/23 2345 93/61 97.8  F (36.6  C) Temporal 111 20 97 %   06/13/23 2256 92/55 99.2  F (37.3  C) Temporal 119 19 99 %     Hemoglobin   Date Value Ref Range Status   06/14/2023 10.4 (L) 13.3 - 17.7 g/dL Final   05/19/2023 15.9 13.3 - 17.7 g/dL Final       Patient is alert and oriented.  Neurovascular status: Grossly intact to LLE.  Dressing: clean and dry  Calves: soft  and non tender          Carlin Lee PA-C  [facility] - Surgery    6/14/2023

## 2023-06-15 ENCOUNTER — APPOINTMENT (OUTPATIENT)
Dept: PHYSICAL THERAPY | Facility: CLINIC | Age: 43
End: 2023-06-15
Attending: ORTHOPAEDIC SURGERY
Payer: COMMERCIAL

## 2023-06-15 ENCOUNTER — APPOINTMENT (OUTPATIENT)
Dept: OCCUPATIONAL THERAPY | Facility: CLINIC | Age: 43
End: 2023-06-15
Attending: PHYSICIAN ASSISTANT
Payer: COMMERCIAL

## 2023-06-15 ENCOUNTER — TELEPHONE (OUTPATIENT)
Dept: ORTHOPEDICS | Facility: CLINIC | Age: 43
End: 2023-06-15

## 2023-06-15 ENCOUNTER — APPOINTMENT (OUTPATIENT)
Dept: ULTRASOUND IMAGING | Facility: CLINIC | Age: 43
End: 2023-06-15
Attending: INTERNAL MEDICINE
Payer: COMMERCIAL

## 2023-06-15 ENCOUNTER — APPOINTMENT (OUTPATIENT)
Dept: GENERAL RADIOLOGY | Facility: CLINIC | Age: 43
End: 2023-06-15
Attending: INTERNAL MEDICINE
Payer: COMMERCIAL

## 2023-06-15 ENCOUNTER — APPOINTMENT (OUTPATIENT)
Dept: GENERAL RADIOLOGY | Facility: CLINIC | Age: 43
End: 2023-06-15
Attending: STUDENT IN AN ORGANIZED HEALTH CARE EDUCATION/TRAINING PROGRAM
Payer: COMMERCIAL

## 2023-06-15 LAB
ABO/RH(D): NORMAL
ALBUMIN SERPL BCG-MCNC: 2.9 G/DL (ref 3.5–5.2)
ALBUMIN UR-MCNC: 10 MG/DL
ALP SERPL-CCNC: 75 U/L (ref 40–129)
ALT SERPL W P-5'-P-CCNC: 10 U/L (ref 0–70)
ANION GAP SERPL CALCULATED.3IONS-SCNC: 11 MMOL/L (ref 7–15)
ANTIBODY SCREEN: NEGATIVE
APPEARANCE UR: CLEAR
AST SERPL W P-5'-P-CCNC: 34 U/L (ref 0–45)
BILIRUB DIRECT SERPL-MCNC: 0.22 MG/DL (ref 0–0.3)
BILIRUB SERPL-MCNC: 0.7 MG/DL
BILIRUB UR QL STRIP: NEGATIVE
BLD PROD TYP BPU: NORMAL
BLOOD COMPONENT TYPE: NORMAL
BUN SERPL-MCNC: 7.3 MG/DL (ref 6–20)
CALCIUM SERPL-MCNC: 8.3 MG/DL (ref 8.6–10)
CHLORIDE SERPL-SCNC: 102 MMOL/L (ref 98–107)
CODING SYSTEM: NORMAL
COLOR UR AUTO: YELLOW
CREAT SERPL-MCNC: 0.8 MG/DL (ref 0.67–1.17)
CROSSMATCH: NORMAL
DEPRECATED HCO3 PLAS-SCNC: 23 MMOL/L (ref 22–29)
ERYTHROCYTE [DISTWIDTH] IN BLOOD BY AUTOMATED COUNT: 12.3 % (ref 10–15)
GFR SERPL CREATININE-BSD FRML MDRD: >90 ML/MIN/1.73M2
GLUCOSE BLDC GLUCOMTR-MCNC: 146 MG/DL (ref 70–99)
GLUCOSE BLDC GLUCOMTR-MCNC: 150 MG/DL (ref 70–99)
GLUCOSE BLDC GLUCOMTR-MCNC: 153 MG/DL (ref 70–99)
GLUCOSE BLDC GLUCOMTR-MCNC: 164 MG/DL (ref 70–99)
GLUCOSE BLDC GLUCOMTR-MCNC: 180 MG/DL (ref 70–99)
GLUCOSE SERPL-MCNC: 155 MG/DL (ref 70–99)
GLUCOSE UR STRIP-MCNC: 200 MG/DL
HCT VFR BLD AUTO: 27.7 % (ref 40–53)
HGB BLD-MCNC: 9.1 G/DL (ref 13.3–17.7)
HGB UR QL STRIP: NEGATIVE
INR PPP: 1.2 (ref 0.85–1.15)
ISSUE DATE AND TIME: NORMAL
KETONES UR STRIP-MCNC: 80 MG/DL
LACTATE SERPL-SCNC: 1.3 MMOL/L (ref 0.7–2)
LACTATE SERPL-SCNC: 1.7 MMOL/L (ref 0.7–2)
LEUKOCYTE ESTERASE UR QL STRIP: NEGATIVE
MAGNESIUM SERPL-MCNC: 2.1 MG/DL (ref 1.7–2.3)
MAGNESIUM SERPL-MCNC: 2.3 MG/DL (ref 1.7–2.3)
MCH RBC QN AUTO: 28.7 PG (ref 26.5–33)
MCHC RBC AUTO-ENTMCNC: 32.9 G/DL (ref 31.5–36.5)
MCV RBC AUTO: 87 FL (ref 78–100)
MUCOUS THREADS #/AREA URNS LPF: PRESENT /LPF
NITRATE UR QL: NEGATIVE
PH UR STRIP: 5.5 [PH] (ref 5–7)
PHOSPHATE SERPL-MCNC: 3.2 MG/DL (ref 2.5–4.5)
PLATELET # BLD AUTO: 194 10E3/UL (ref 150–450)
POTASSIUM SERPL-SCNC: 4.1 MMOL/L (ref 3.4–5.3)
PROCALCITONIN SERPL IA-MCNC: 0.3 NG/ML
PROT SERPL-MCNC: 5.8 G/DL (ref 6.4–8.3)
RBC # BLD AUTO: 3.17 10E6/UL (ref 4.4–5.9)
RBC URINE: 1 /HPF
SODIUM SERPL-SCNC: 136 MMOL/L (ref 136–145)
SP GR UR STRIP: 1.02 (ref 1–1.03)
SPECIMEN EXPIRATION DATE: NORMAL
UNIT ABO/RH: NORMAL
UNIT NUMBER: NORMAL
UNIT STATUS: NORMAL
UNIT TYPE ISBT: 5100
UROBILINOGEN UR STRIP-MCNC: NORMAL MG/DL
WBC # BLD AUTO: 10.8 10E3/UL (ref 4–11)
WBC # BLD AUTO: 13.2 10E3/UL (ref 4–11)
WBC URINE: 0 /HPF

## 2023-06-15 PROCEDURE — 82962 GLUCOSE BLOOD TEST: CPT

## 2023-06-15 PROCEDURE — 99232 SBSQ HOSP IP/OBS MODERATE 35: CPT | Performed by: STUDENT IN AN ORGANIZED HEALTH CARE EDUCATION/TRAINING PROGRAM

## 2023-06-15 PROCEDURE — 36415 COLL VENOUS BLD VENIPUNCTURE: CPT | Performed by: STUDENT IN AN ORGANIZED HEALTH CARE EDUCATION/TRAINING PROGRAM

## 2023-06-15 PROCEDURE — 258N000003 HC RX IP 258 OP 636: Performed by: STUDENT IN AN ORGANIZED HEALTH CARE EDUCATION/TRAINING PROGRAM

## 2023-06-15 PROCEDURE — 250N000013 HC RX MED GY IP 250 OP 250 PS 637: Performed by: PHYSICIAN ASSISTANT

## 2023-06-15 PROCEDURE — 86901 BLOOD TYPING SEROLOGIC RH(D): CPT

## 2023-06-15 PROCEDURE — 83735 ASSAY OF MAGNESIUM: CPT | Performed by: ORTHOPAEDIC SURGERY

## 2023-06-15 PROCEDURE — 84145 PROCALCITONIN (PCT): CPT | Performed by: INTERNAL MEDICINE

## 2023-06-15 PROCEDURE — 36415 COLL VENOUS BLD VENIPUNCTURE: CPT

## 2023-06-15 PROCEDURE — 36415 COLL VENOUS BLD VENIPUNCTURE: CPT | Performed by: INTERNAL MEDICINE

## 2023-06-15 PROCEDURE — 71045 X-RAY EXAM CHEST 1 VIEW: CPT

## 2023-06-15 PROCEDURE — 250N000011 HC RX IP 250 OP 636: Performed by: STUDENT IN AN ORGANIZED HEALTH CARE EDUCATION/TRAINING PROGRAM

## 2023-06-15 PROCEDURE — 86850 RBC ANTIBODY SCREEN: CPT

## 2023-06-15 PROCEDURE — 85610 PROTHROMBIN TIME: CPT | Performed by: INTERNAL MEDICINE

## 2023-06-15 PROCEDURE — 97110 THERAPEUTIC EXERCISES: CPT | Mod: GP | Performed by: PHYSICAL THERAPIST

## 2023-06-15 PROCEDURE — 250N000013 HC RX MED GY IP 250 OP 250 PS 637: Performed by: ORTHOPAEDIC SURGERY

## 2023-06-15 PROCEDURE — 85027 COMPLETE CBC AUTOMATED: CPT | Performed by: INTERNAL MEDICINE

## 2023-06-15 PROCEDURE — 250N000013 HC RX MED GY IP 250 OP 250 PS 637: Performed by: INTERNAL MEDICINE

## 2023-06-15 PROCEDURE — 93971 EXTREMITY STUDY: CPT | Mod: RT

## 2023-06-15 PROCEDURE — P9016 RBC LEUKOCYTES REDUCED: HCPCS | Performed by: STUDENT IN AN ORGANIZED HEALTH CARE EDUCATION/TRAINING PROGRAM

## 2023-06-15 PROCEDURE — 83735 ASSAY OF MAGNESIUM: CPT | Performed by: STUDENT IN AN ORGANIZED HEALTH CARE EDUCATION/TRAINING PROGRAM

## 2023-06-15 PROCEDURE — 120N000001 HC R&B MED SURG/OB

## 2023-06-15 PROCEDURE — 97530 THERAPEUTIC ACTIVITIES: CPT | Mod: GO

## 2023-06-15 PROCEDURE — 71046 X-RAY EXAM CHEST 2 VIEWS: CPT

## 2023-06-15 PROCEDURE — 85048 AUTOMATED LEUKOCYTE COUNT: CPT | Performed by: STUDENT IN AN ORGANIZED HEALTH CARE EDUCATION/TRAINING PROGRAM

## 2023-06-15 PROCEDURE — 81003 URINALYSIS AUTO W/O SCOPE: CPT | Performed by: INTERNAL MEDICINE

## 2023-06-15 PROCEDURE — 93010 ELECTROCARDIOGRAM REPORT: CPT | Performed by: INTERNAL MEDICINE

## 2023-06-15 PROCEDURE — 97530 THERAPEUTIC ACTIVITIES: CPT | Mod: GP | Performed by: PHYSICAL THERAPIST

## 2023-06-15 PROCEDURE — 86923 COMPATIBILITY TEST ELECTRIC: CPT | Performed by: STUDENT IN AN ORGANIZED HEALTH CARE EDUCATION/TRAINING PROGRAM

## 2023-06-15 PROCEDURE — 83605 ASSAY OF LACTIC ACID: CPT | Performed by: STUDENT IN AN ORGANIZED HEALTH CARE EDUCATION/TRAINING PROGRAM

## 2023-06-15 PROCEDURE — 82248 BILIRUBIN DIRECT: CPT | Performed by: INTERNAL MEDICINE

## 2023-06-15 PROCEDURE — 250N000012 HC RX MED GY IP 250 OP 636 PS 637: Performed by: INTERNAL MEDICINE

## 2023-06-15 PROCEDURE — 97165 OT EVAL LOW COMPLEX 30 MIN: CPT | Mod: GO

## 2023-06-15 PROCEDURE — 87040 BLOOD CULTURE FOR BACTERIA: CPT | Performed by: INTERNAL MEDICINE

## 2023-06-15 PROCEDURE — 84100 ASSAY OF PHOSPHORUS: CPT | Performed by: ORTHOPAEDIC SURGERY

## 2023-06-15 PROCEDURE — 97116 GAIT TRAINING THERAPY: CPT | Mod: GP | Performed by: PHYSICAL THERAPIST

## 2023-06-15 PROCEDURE — 97535 SELF CARE MNGMENT TRAINING: CPT | Mod: GO

## 2023-06-15 RX ORDER — DIPHENHYDRAMINE HYDROCHLORIDE 50 MG/ML
25 INJECTION INTRAMUSCULAR; INTRAVENOUS EVERY 6 HOURS PRN
Status: DISCONTINUED | OUTPATIENT
Start: 2023-06-15 | End: 2023-06-19 | Stop reason: HOSPADM

## 2023-06-15 RX ORDER — DIPHENHYDRAMINE HCL 25 MG
25 CAPSULE ORAL EVERY 6 HOURS PRN
Status: DISCONTINUED | OUTPATIENT
Start: 2023-06-15 | End: 2023-06-19 | Stop reason: HOSPADM

## 2023-06-15 RX ORDER — CEFEPIME HYDROCHLORIDE 2 G/1
2 INJECTION, POWDER, FOR SOLUTION INTRAVENOUS EVERY 8 HOURS
Status: DISCONTINUED | OUTPATIENT
Start: 2023-06-15 | End: 2023-06-17

## 2023-06-15 RX ADMIN — INSULIN ASPART 1 UNITS: 100 INJECTION, SOLUTION INTRAVENOUS; SUBCUTANEOUS at 18:23

## 2023-06-15 RX ADMIN — SENNOSIDES AND DOCUSATE SODIUM 1 TABLET: 50; 8.6 TABLET ORAL at 09:52

## 2023-06-15 RX ADMIN — POTASSIUM & SODIUM PHOSPHATES POWDER PACK 280-160-250 MG 1 PACKET: 280-160-250 PACK at 00:43

## 2023-06-15 RX ADMIN — VANCOMYCIN HYDROCHLORIDE 1500 MG: 10 INJECTION, POWDER, LYOPHILIZED, FOR SOLUTION INTRAVENOUS at 21:31

## 2023-06-15 RX ADMIN — B-COMPLEX W/ C & FOLIC ACID TAB 1 TABLET: TAB at 09:52

## 2023-06-15 RX ADMIN — SENNOSIDES AND DOCUSATE SODIUM 1 TABLET: 50; 8.6 TABLET ORAL at 19:49

## 2023-06-15 RX ADMIN — INSULIN ASPART 1 UNITS: 100 INJECTION, SOLUTION INTRAVENOUS; SUBCUTANEOUS at 09:52

## 2023-06-15 RX ADMIN — ASPIRIN 81 MG: 81 TABLET, COATED ORAL at 09:51

## 2023-06-15 RX ADMIN — POLYETHYLENE GLYCOL 3350 17 G: 17 POWDER, FOR SOLUTION ORAL at 09:50

## 2023-06-15 RX ADMIN — SODIUM CHLORIDE 1000 ML: 9 INJECTION, SOLUTION INTRAVENOUS at 11:06

## 2023-06-15 RX ADMIN — ACETAMINOPHEN 975 MG: 325 TABLET, FILM COATED ORAL at 11:14

## 2023-06-15 RX ADMIN — ACETAMINOPHEN 975 MG: 325 TABLET, FILM COATED ORAL at 18:19

## 2023-06-15 RX ADMIN — ALLOPURINOL 400 MG: 300 TABLET ORAL at 09:51

## 2023-06-15 RX ADMIN — HYDROMORPHONE HYDROCHLORIDE 2 MG: 2 TABLET ORAL at 05:51

## 2023-06-15 RX ADMIN — ASPIRIN 81 MG: 81 TABLET, COATED ORAL at 19:49

## 2023-06-15 RX ADMIN — HYDROXYZINE HYDROCHLORIDE 25 MG: 25 TABLET, FILM COATED ORAL at 05:50

## 2023-06-15 RX ADMIN — CEFEPIME 2 G: 2 INJECTION, POWDER, FOR SOLUTION INTRAVENOUS at 19:43

## 2023-06-15 RX ADMIN — SODIUM CHLORIDE, POTASSIUM CHLORIDE, SODIUM LACTATE AND CALCIUM CHLORIDE: 600; 310; 30; 20 INJECTION, SOLUTION INTRAVENOUS at 16:43

## 2023-06-15 RX ADMIN — INSULIN ASPART 1 UNITS: 100 INJECTION, SOLUTION INTRAVENOUS; SUBCUTANEOUS at 13:18

## 2023-06-15 RX ADMIN — ACETAMINOPHEN 975 MG: 325 TABLET, FILM COATED ORAL at 02:24

## 2023-06-15 ASSESSMENT — ACTIVITIES OF DAILY LIVING (ADL)
ADLS_ACUITY_SCORE: 21

## 2023-06-15 NOTE — TELEPHONE ENCOUNTER
"[facility] Call Center    Phone Message    May a detailed message be left on voicemail: yes     Reason for Call: Other: Abe from Utilization asking for call back from Dr. Hooks care team re: pt and order.  Pt had surgery with dr. key on 06/13 and abe is asking for order to be changed to \" inpatient\".  Abe can be reached at [phone] .   Or  # [phone] Dr. Collado.       Action Taken: Message routed to:  Other: Dr. Key     Travel Screening: Not Applicable                                                                        "

## 2023-06-15 NOTE — PLAN OF CARE
Goal Outcome Evaluation:      Plan of Care Reviewed With: patient    Overall Patient Progress: no change    Patient vital signs are at baseline: No,  Reason:  Tachycardic  Patient able to ambulate as they were prior to admission or with assist devices provided by therapies during their stay:  No,  Reason:  Up to a commode one time.   Patient MUST void prior to discharge:  Yes  Patient able to tolerate oral intake:  Yes  Pain has adequate pain control using Oral analgesics:  Yes  Does patient have an identified :  Yes  Has goal D/C date and time been discussed with patient:  No,  Reason:  Tachycardic.    Pt is alert and oriented times four; able to communicate needs and concerns. RA sating mid 90's. Tachycardic. A+1 with a walker and a gait belt for transfers. C/O moderate pain. Pain has been managed by PO dilaudid. On K+//Mg+/Phos protocol. K+ AM draw/ Mg replaced/ Phos replaced. Voiding w/o difficulty. Has adequate appetite. Was up to a commode with A+2 and a gait belt. Had a BM. Will continue monitoring.

## 2023-06-15 NOTE — PLAN OF CARE
Goal Outcome Evaluation:      Plan of Care Reviewed With: patient    Overall Patient Progress: no change    Patient vital signs are at baseline: No,  Reason: Soft BP's, tachycardic, febrile up to 101.8  Patient able to ambulate as they were prior to admission or with assist devices provided by therapies during their stay:  No,  Reason:  Pivot to beside commode with 2A, GB, walker. Unable to ambulate at this time  Patient MUST void prior to discharge:  Yes  Patient able to tolerate oral intake:  Yes  Pain has adequate pain control using Oral analgesics:  Yes, pain managed with po dilaudid, PRN Atarax and scheduled Tylenol   Does patient have an identified :  Yes, spouse   Has goal D/C date and time been discussed with patient:  Yes     Pt alert & oriented x 4. On 1L of O2 overnight in place of CPAP. Work up for elevated temp in process. CMS intact.
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
## 2023-06-15 NOTE — PLAN OF CARE
Goal Outcome Evaluation:      Plan of Care Reviewed With: patient, spouse      Patient vital signs are at baseline: No,  Reason:  Tachycardic and hypotensive. Afebrile RA.   Patient able to ambulate as they were prior to admission or with assist devices provided by therapies during their stay:  No,  Reason:  up with assist of 1 gait belt and walker  Patient MUST void prior to discharge:  Yes  Patient able to tolerate oral intake:  Yes  Pain has adequate pain control using Oral analgesics:  Yes pain manged with prn Oxycodone and scheduled Tylenol  Does patient have an identified :  Yes wife at bedside  Has goal D/C date and time been discussed with patient:  No,  Reason:  plan is TCU at discharge per PT.     Normal Saline 1,000 ml Bolus given to see if BP will increase. Otherwise possible blood transfusion may be needed.    1650 1 unite PRBC given. Pt started it with temp of 100.4 and went as high as 101.8. Sepsis triggered. Lactic Acid 1.7 scheduled Tylenol given. IS done.  Remote tele 's. MD paged.    4800 New orders for Vancomycin and Maxipime given. Along with WBC Count to be drawn.       Will continue to monitor status

## 2023-06-15 NOTE — PROGRESS NOTES
06/15/23 1100   Appointment Info   Signing Clinician's Name / Credentials (OT) Rogelio Sanford OTR/L   Quick Adds   Quick Adds Certification   Living Environment   People in Home spouse;child(juliette), dependent   Current Living Arrangements apartment   Home Accessibility stairs to enter home   Number of Stairs, Main Entrance 7   Stair Railings, Main Entrance railings safe and in good condition   Number of Stairs, Within Home, Primary greater than 10 stairs   Stair Railings, Within Home, Primary railing on right side (ascending)   Transportation Anticipated family or friend will provide   Living Environment Comments Bathroom setup includes tub shower and standard height toilet seat w/ no grab bars.   Self-Care   Usual Activity Tolerance moderate   Current Activity Tolerance poor   Equipment Currently Used at Home none   Fall history within last six months no   Activity/Exercise/Self-Care Comment Previously independent with all I/ADLs   General Information   Onset of Illness/Injury or Date of Surgery 06/13/23   Referring Physician Carlin Lee PA-C   Patient/Family Therapy Goal Statement (OT) To go home and do things independently   Additional Occupational Profile Info/Pertinent History of Current Problem 43 year old male POD#2 L IVONNE. Patient has history of L hip surgeries following an injury at the age of 13. Per report, has nearly no muscle function at proximal L LE   Existing Precautions/Restrictions fall;no hip ER;no hip ADD past midline;other (see comments)  (no figure 4/ ankle to knee position)   Left Lower Extremity (Weight-bearing Status) weight-bearing as tolerated (WBAT)   Cognitive Status Examination   Orientation Status orientation to person, place and time   Follows Commands does not follow one-step commands;over 90% accuracy   Pain Assessment   Patient Currently in Pain Yes, see Vital Sign flowsheet   Range of Motion Comprehensive   General Range of Motion bilateral upper extremity ROM WFL    Strength Comprehensive (MMT)   Comment, General Manual Muscle Testing (MMT) Assessment B UE strength WFL. Major deficit at LLE   Bed Mobility   Bed Mobility supine-sit;sit-supine   Supine-Sit Dalton (Bed Mobility) minimum assist (75% patient effort)   Sit-Supine [place] (Bed Mobility) minimum assist (75% patient effort)   Assistive Device (Bed Mobility) bed rails   Transfers   Transfers sit-stand transfer   Sit-Stand Transfer   Sit-Stand Dalton (Transfers) contact guard   Assistive Device (Sit-Stand Transfers) walker, front-wheeled   Activities of Daily Living   BADL Assessment/Intervention lower body dressing   Lower Body Dressing Assessment/Training   Dalton Level (Lower Body Dressing) maximum assist (25% patient effort)   Clinical Impression   Criteria for Skilled Therapeutic Interventions Met (OT) Yes, treatment indicated   OT Diagnosis Decreased independence with I/ADLs   OT Problem List-Impairments impacting ADL problems related to;activity tolerance impaired;strength;post-surgical precautions;pain   Assessment of Occupational Performance 5 or more Performance Deficits   Identified Performance Deficits Dressing, bathing, driving, toileting, functional mobility, home mgmt   Planned Therapy Interventions (OT) ADL retraining;IADL retraining;transfer training;progressive activity/exercise   Clinical Decision Making Complexity (OT) low complexity   Risk & Benefits of therapy have been explained evaluation/treatment results reviewed;care plan/treatment goals reviewed;risks/benefits reviewed;current/potential barriers reviewed;participants voiced agreement with care plan;participants included;patient   OT Total Evaluation Time   OT Eval, Low Complexity Minutes (10805) 10   Therapy Certification   Medical Diagnosis IVONNE   Start of Care Date 06/15/23   Certification date from 06/15/23   Certification date to 06/22/23   OT Goals   Therapy Frequency (OT) 5 times/wk   OT Predicted Duration/Target  Date for Goal Attainment 06/22/23   OT Goals Lower Body Dressing;Upper Body Dressing;Hygiene/Grooming;Toilet Transfer/Toileting   OT: Hygiene/Grooming modified independent;while standing   OT: Upper Body Dressing Modified independent;including set-up/clothing retrieval   OT: Lower Body Dressing Modified independent;using adaptive equipment;including set-up/clothing retrieval   OT: Toilet Transfer/Toileting cleaning and garment management;Supervision/stand-by assist   Self-Care/Home Management   Self-Care/Home Mgmt/ADL, Compensatory, Meal Prep Minutes (97423) 12   Symptoms Noted During/After Treatment (Meal Preparation/Planning Training) increased pain;fatigue   Treatment Detail/Skilled Intervention Seated EOB for ADL training and to progress activity tolerance. monitoring vitals d/t pt being tachy and hypotensive w/ activity, see VS flowsheets. Providing training and demo on use of AE (sock aid and reacher) for LB dressing. Following training, pt completing LB dressing w/ minimal assist, showing improved independence. Providing education where to purchase AE.   Therapeutic Activities   Therapeutic Activity Minutes (12044) 12   Symptoms noted during/after treatment increased pain;fatigue   Treatment Detail/Skilled Intervention Patient transfering supine to sitting w/ moderate A, including total A to move L LE. Providing cueing for increased active use of L LE w/ transfer. pt standing EOB w/ CGA, FWW, and moderate cueing for hand placement on EOB vs walker as pt transitions. Times spent monitoring vitals, see VS flowsheets. Pt tolerating 3 minutes standing. Cueing pt to step forward, moving foward 2 feet and then cued to move back to EOB. Pt unable to clear L LE from floor, sliding foot w/ steps. Providing education on L hip precautions and mod cues for precautions while standing/ stepping. Pt tranfering supine, requiring total A as L LE and moderate cueing to scoot up in bed. Pt positioned and left w/ alarm on and  needs in reach.   OT Discharge Planning   OT Plan BSC transfer, standing g/h   OT Discharge Recommendation (DC Rec)   (defer to ortho)   OT Rationale for DC Rec Pt presenting significantly below baseline of independent. Pt is currently requiring assistance of 1 for all self cares and functional mobility and is limited by pain, weakness, activity tolerance, and medical stability. Recommend pt recieve continued skilled OT at TCU to further progress strength and independence with I/ADLs prior to return home. If returning home, pt would require 24/7 assist with all mobility and self-cares.    OT Brief overview of current status mod A bed mobility   Total Session Time   Timed Code Treatment Minutes 24   Total Session Time (sum of timed and untimed services) 34

## 2023-06-15 NOTE — PROGRESS NOTES
[facility]  Orthopedics Progress Note             Interval History:     43 year old male admitted postoperatively for elective left total hip arthroplasty with Dr. Orozco.  There were no intraoperative complications.  Patient currently Post op day #2.    Patient reports feeling somewhat better while resting here in bed however has been unable to get out of bed other than standing due to being dizzy.  He is concerned about his discharged home as he has several stairs in order to navigate the home.  Otherwise he is eating, voiding, resting.  Pain is much better controlled today.    Patient continues to be tachycardic with moderate hypotension.  Hemoglobin trended down again slightly.    Pain: Tolerable  Nausea: None  Light headedness : Some with standing.  Chest pain: none  Shortness of breath: none              Assessment and Plan:     Status post left total hip arthroplasty, day #2.  Symptomatic acute blood loss with tachycardia and hypotension.  Triggered infection protocol however no significant findings at this time.  No significant signs of ongoing hemorrhage at the surgical site.    Plan:  Social work consulted for discharge needs.  PT OT as tolerated.  Hospitalist care is appreciated, transfusing, fluid resuscitation, infectious work-up.  May weight-bear as tolerated.    Discharge pending.  Probable also                  Physical Exam:   Patient Vitals for the past 12 hrs:   BP Temp Temp src Pulse Resp SpO2   06/15/23 1306 104/56 -- -- (!) 135 16 --   06/15/23 1304 98/56 99  F (37.2  C) Temporal (!) 136 16 --   06/15/23 1155 -- -- -- (!) 149 -- --   06/15/23 1150 98/66 -- -- (!) 138 -- --   06/15/23 1140 103/61 -- -- (!) 130 -- --   06/15/23 0900 -- -- -- (!) 152 -- --   06/15/23 0702 100/59 98.6  F (37  C) Temporal (!) 121 14 93 %   06/15/23 0344 -- 98.8  F (37.1  C) Temporal -- -- --     Hemoglobin   Date Value Ref Range Status   06/15/2023 9.1 (L) 13.3 - 17.7 g/dL Final   06/14/2023 9.3 (L) 13.3  - 17.7 g/dL Final       Patient is alert and oriented.  Neurovascular status: Grossly intact to LLE.  Dressing: clean and dry  Calves: soft and non tender          Carlin Lee PA-C  [facility] - Surgery    6/15/2023

## 2023-06-15 NOTE — PROGRESS NOTES
[facility]  Medicine Progress Note - Hospitalist Service  Date of Admission:  6/13/2023    Assessment & Plan   Mr. Cora Senior is a 43 year old man native to [facility] who was admitted for Right IVONNE. Medicine service was consulted to assist with comanagement of chronic medical problems in addition to sinus tachycardia and postsurgical blood loss anemia.      Medical history is very complicated by social history.  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 Hx of multiple hip surgeries following a hip injury at age 13.  His prior surgeries were complex and incompletely beneficial.  The patient really has been unable to bear weight on his left lower extremity for years and he indicates that he really has no muscle function on left lower extremity. Otherwise, the patient has diabetes which previously has been very poorly controlled but more recently he is fairly well controlled.  He indicates that he has had multiple transfusions following prior surgeries though its not clear that he had massive bleeding problems.  I note that in the past his hemoglobin values have been on the high side (running between 15 and 17).  He has problems with sleep not only related to SULTANA.       Assessment and plan:   POD #1 s/p right IVONNE.    -Orthopedics managing and primary team    Fluids, change oral pain mediation to Dilaudid    Hospitalist and PT/OT input appreciated   -Gram stain of tissue with no organisms seen but 4+ white blood cells and 2+ PMNs, otherwise cultures negative at this time    Postoperative fever, resolved   Leukocytosis  Suspected fever the evening postoperatively, UA was without evidence of infection, chest x-ray without any obvious infiltrate, ultrasound of the lower extremities with no DVTs, However was elevated at 0.30.  -Blood cultures pending  -No indication for antibiotics his fever has now resolved  -Monitoring for  recurrent fever and low threshold to start broad-spectrum antibiotics with recurrence  -Working up tachycardia as below     Sinus tachycardia following surgery.  Suspect due to decreased intravascular volume  -IV fluid bolus 06/15/23   -EKG to ensure no tachyarrhythmia  -Cardiac monitoring    Acute postsurgical blood loss anemia   Pre-op Hgb 15.9 (on 5/19/23).  Note that iron studies are equivocal, folate is low, vitamin B12 is relatively low. Consider possible underlying malnutrition.    -Empirically started B complex vitamin with vitamin C.    -HGB 06/15/23 9.1   -transfusion 06/15/23 as remains cardiac and symptomatic with fatigue, symptom limited PT  -Repeat hemoglobin posttransfusion  -Repeat CBC in a.m.    NIDDM.   -hold PTA Metformin as is  -Insulin sliding scale.     Moderately decreased functional cardiac capacity for age.   TTE performed on 3/20/23 with normal stress echocardiogram without evidence of stress-induced ischemia, EF at 55 to 60%, stress testing without any anginal chest pain, moderately reduced functional capacity for age noted.     Chronic Gout -patient is not complaining of any pain to any joint other than his left hip which is where the procedure was performed, there is also no evidence of crystal formation at the hip which would make gout of the hip unlikely.  Acute gout attack does not fit his clinical picture as outlined above  - PTA allopurinol    HTN   SULTANA. -On BiPAP  Hypomagnesemia-replace as needed  Hypoalbuminemia-noted on admission, nutrition evaluation pending  History of tobacco abuse without previously documented lung disease.  Hx tobacco abuse. Quit in 2022   Obesity-BMI 30.7     Diet: Advance Diet as Tolerated: Regular Diet Adult  Discharge Instruction - Regular Diet Adult    DVT Prophylaxis: Pneumatic Compression Devices  Malhotra Catheter: Not present  Lines: None     Cardiac Monitoring: ACTIVE order. Indication: Tachyarrhythmias, acute (48 hours)  Code Status: Full Code   "    Clinically Significant Risk Factors Present on Admission            # Hypomagnesemia: Lowest Mg = 1.5 mg/dL in last 2 days, will replace as needed   # Hypoalbuminemia: Lowest albumin = 2.9 g/dL at 6/15/2023  6:16 AM, will monitor as appropriate  # Coagulation Defect: INR = 1.20 (Ref range: 0.85 - 1.15) and/or PTT = N/A, will monitor for bleeding    # Hypertension: Home medication list includes antihypertensive(s)     # DMII: A1C = 7.2 % (Ref range: 0.0 - 5.6 %) within past 6 months    # Obesity: Estimated body mass index is 30.7 kg/m  as calculated from the following:    Height as of this encounter: 1.75 m (5' 8.9\").    Weight as of this encounter: 94 kg (207 lb 4.8 oz).            Disposition Plan      Expected Discharge Date: 06/16/2023                  Sierra Lawrence DO  Hospitalist Service  [facility]  Securely message with Cardio control (more info)  Text page via [employer] Paging/Directory   ______________________________________________________________________    Interval History   Continues to feel fatigued    He did have a fever greater than 101 on the evening of 6/14, no evidence of active infection at this time    Was no ongoing fever or chills    Continues to have hip pain but feels that overall it is improved, he denies any numbness or tingling, he has had difficulty with exercise given his symptoms of fatigue in addition to tachycardia    Does not report any chest pain, pressure or shortness of breath.  He reports no other concerns or complaints today.  Significant other is at bedside and helping with feeding during interview today.    Physical Exam   Vital Signs: Temp: 99  F (37.2  C) Temp src: Temporal BP: 104/56 Pulse: (!) 135   Resp: 16 SpO2: 93 % O2 Device: Nasal cannula Oxygen Delivery: 1 LPM  Weight: 207 lbs 4.8 oz    Constitutional: awake, fatigued appearing male, cooperative, no apparent distress, and appears stated age  HEENT: Normocephalic, atraumatic  Respiratory: Normal " work of breathing, good air exchange, clear to auscultation bilaterally, no crackles or wheezing noted  Cardiovascular: Regular rate and rhythm, no murmurs appreciated  GI: Soft and nontender to palpation, nondistended, no rebound or guarding  Skin: normal skin color, texture, turgor  Wound VAC over left hip, clean and dry with no purulent drainage  Musculoskeletal: No deformities, no edema present  Neurologic: Alert and oriented, no focal deficits   Neuropsychiatric: General: normal, calm and normal eye contact      Data   ------------------------- PAST 24 HR DATA REVIEWED -----------------------------------------------    I have personally reviewed the following data over the past 24 hrs:    13.2 (H)  \   9.1 (L)   / 194     136 102 7.3 /  180 (H)   4.1 23 0.80 \       ALT: 10 AST: 34 AP: 75 TBILI: 0.7   ALB: 2.9 (L) TOT PROTEIN: 5.8 (L) LIPASE: N/A       Procal: 0.30 (H) CRP: N/A Lactic Acid: 1.3       INR:  1.20 (H) PTT:  N/A   D-dimer:  N/A Fibrinogen:  N/A       Imaging results reviewed over the past 24 hrs:   Recent Results (from the past 24 hour(s))   XR Chest Port 1 View    Narrative    EXAM: XR CHEST PORT 1 VIEW  LOCATION: [facility]  DATE: 6/15/2023    INDICATION: Fever, tachycardia.  COMPARISON: None.      Impression    IMPRESSION:   Moderate low lung volumes accentuate the heart size and pulmonary vasculature. Heart size and pulmonary vascularity within normal limits. No focal lung infiltrates. Mild elevation right hemidiaphragm. Osseous structures grossly intact. Visualized upper   abdomen unremarkable.   US Lower Extremity Venous Duplex Right    Narrative    EXAM: US LOWER EXTREMITY VENOUS DUPLEX RIGHT  LOCATION: [facility]  DATE: 6/15/2023    INDICATION: Lower extremity pain and swelling. Recent surgery.  COMPARISON: None.  TECHNIQUE: Venous Duplex ultrasound of the right lower extremity with and without compression, augmentation and duplex. Color flow  and spectral Doppler with waveform analysis performed.    FINDINGS: Exam includes the common femoral, femoral, popliteal, and contralateral common femoral veins as well as segmentally visualized deep calf veins and greater saphenous vein.     RIGHT: No deep vein thrombosis. No superficial thrombophlebitis. No popliteal cyst.      Impression    IMPRESSION:  No deep venous thrombosis in the right lower extremity.

## 2023-06-15 NOTE — TELEPHONE ENCOUNTER
Attempted to return call to Zoey. Voicemail greeting not set up. LVM without PHI and asked to contact clinic.     Tara Holter, RNCC

## 2023-06-16 ENCOUNTER — APPOINTMENT (OUTPATIENT)
Dept: CT IMAGING | Facility: CLINIC | Age: 43
End: 2023-06-16
Attending: STUDENT IN AN ORGANIZED HEALTH CARE EDUCATION/TRAINING PROGRAM
Payer: COMMERCIAL

## 2023-06-16 ENCOUNTER — APPOINTMENT (OUTPATIENT)
Dept: PHYSICAL THERAPY | Facility: CLINIC | Age: 43
End: 2023-06-16
Attending: ORTHOPAEDIC SURGERY
Payer: COMMERCIAL

## 2023-06-16 ENCOUNTER — APPOINTMENT (OUTPATIENT)
Dept: CARDIOLOGY | Facility: CLINIC | Age: 43
End: 2023-06-16
Attending: STUDENT IN AN ORGANIZED HEALTH CARE EDUCATION/TRAINING PROGRAM
Payer: COMMERCIAL

## 2023-06-16 LAB
ALBUMIN SERPL BCG-MCNC: 2.4 G/DL (ref 3.5–5.2)
ALP SERPL-CCNC: 72 U/L (ref 40–129)
ALT SERPL W P-5'-P-CCNC: 8 U/L (ref 0–70)
ANION GAP SERPL CALCULATED.3IONS-SCNC: 10 MMOL/L (ref 7–15)
AST SERPL W P-5'-P-CCNC: 31 U/L (ref 0–45)
ATRIAL RATE - MUSE: 134 BPM
BASOPHILS # BLD AUTO: 0 10E3/UL (ref 0–0.2)
BASOPHILS NFR BLD AUTO: 0 %
BILIRUB SERPL-MCNC: 0.5 MG/DL
BUN SERPL-MCNC: 6.4 MG/DL (ref 6–20)
CALCIUM SERPL-MCNC: 8.6 MG/DL (ref 8.6–10)
CHLORIDE SERPL-SCNC: 102 MMOL/L (ref 98–107)
CREAT SERPL-MCNC: 0.73 MG/DL (ref 0.67–1.17)
DEPRECATED HCO3 PLAS-SCNC: 23 MMOL/L (ref 22–29)
DIASTOLIC BLOOD PRESSURE - MUSE: NORMAL MMHG
EOSINOPHIL # BLD AUTO: 0.2 10E3/UL (ref 0–0.7)
EOSINOPHIL NFR BLD AUTO: 2 %
ERYTHROCYTE [DISTWIDTH] IN BLOOD BY AUTOMATED COUNT: 12.5 % (ref 10–15)
ERYTHROCYTE [DISTWIDTH] IN BLOOD BY AUTOMATED COUNT: 12.6 % (ref 10–15)
GFR SERPL CREATININE-BSD FRML MDRD: >90 ML/MIN/1.73M2
GLUCOSE BLDC GLUCOMTR-MCNC: 136 MG/DL (ref 70–99)
GLUCOSE BLDC GLUCOMTR-MCNC: 153 MG/DL (ref 70–99)
GLUCOSE BLDC GLUCOMTR-MCNC: 161 MG/DL (ref 70–99)
GLUCOSE BLDC GLUCOMTR-MCNC: 184 MG/DL (ref 70–99)
GLUCOSE SERPL-MCNC: 137 MG/DL (ref 70–99)
HCT VFR BLD AUTO: 25.3 % (ref 40–53)
HCT VFR BLD AUTO: 27.8 % (ref 40–53)
HGB BLD-MCNC: 8.3 G/DL (ref 13.3–17.7)
HGB BLD-MCNC: 8.6 G/DL (ref 13.3–17.7)
HGB BLD-MCNC: 9.3 G/DL (ref 13.3–17.7)
IMM GRANULOCYTES # BLD: 0.1 10E3/UL
IMM GRANULOCYTES NFR BLD: 1 %
INTERPRETATION ECG - MUSE: NORMAL
LACTATE SERPL-SCNC: 1.1 MMOL/L (ref 0.7–2)
LACTATE SERPL-SCNC: 2.1 MMOL/L (ref 0.7–2)
LACTATE SERPL-SCNC: 2.4 MMOL/L (ref 0.7–2)
LVEF ECHO: NORMAL
LYMPHOCYTES # BLD AUTO: 1.6 10E3/UL (ref 0.8–5.3)
LYMPHOCYTES NFR BLD AUTO: 13 %
MAGNESIUM SERPL-MCNC: 1.6 MG/DL (ref 1.7–2.3)
MCH RBC QN AUTO: 29.2 PG (ref 26.5–33)
MCH RBC QN AUTO: 29.4 PG (ref 26.5–33)
MCHC RBC AUTO-ENTMCNC: 33.5 G/DL (ref 31.5–36.5)
MCHC RBC AUTO-ENTMCNC: 34 G/DL (ref 31.5–36.5)
MCV RBC AUTO: 86 FL (ref 78–100)
MCV RBC AUTO: 87 FL (ref 78–100)
MONOCYTES # BLD AUTO: 1.3 10E3/UL (ref 0–1.3)
MONOCYTES NFR BLD AUTO: 11 %
NEUTROPHILS # BLD AUTO: 9.4 10E3/UL (ref 1.6–8.3)
NEUTROPHILS NFR BLD AUTO: 73 %
NRBC # BLD AUTO: 0 10E3/UL
NRBC BLD AUTO-RTO: 0 /100
P AXIS - MUSE: 39 DEGREES
PATH REPORT.COMMENTS IMP SPEC: NORMAL
PATH REPORT.COMMENTS IMP SPEC: NORMAL
PATH REPORT.FINAL DX SPEC: NORMAL
PATH REPORT.MICROSCOPIC SPEC OTHER STN: NORMAL
PATH REPORT.MICROSCOPIC SPEC OTHER STN: NORMAL
PATH REPORT.RELEVANT HX SPEC: NORMAL
PHOSPHATE SERPL-MCNC: 3.7 MG/DL (ref 2.5–4.5)
PLATELET # BLD AUTO: 153 10E3/UL (ref 150–450)
PLATELET # BLD AUTO: 184 10E3/UL (ref 150–450)
POTASSIUM SERPL-SCNC: 3.6 MMOL/L (ref 3.4–5.3)
PR INTERVAL - MUSE: 144 MS
PROT SERPL-MCNC: 5.7 G/DL (ref 6.4–8.3)
QRS DURATION - MUSE: 82 MS
QT - MUSE: 280 MS
QTC - MUSE: 418 MS
R AXIS - MUSE: 11 DEGREES
RBC # BLD AUTO: 2.93 10E6/UL (ref 4.4–5.9)
RBC # BLD AUTO: 3.19 10E6/UL (ref 4.4–5.9)
RETICS # AUTO: 0.09 10E6/UL (ref 0.03–0.1)
RETICS/RBC NFR AUTO: 3 % (ref 0.5–2)
SODIUM SERPL-SCNC: 135 MMOL/L (ref 136–145)
SYSTOLIC BLOOD PRESSURE - MUSE: NORMAL MMHG
T AXIS - MUSE: 11 DEGREES
TROPONIN T SERPL HS-MCNC: 6 NG/L
VENTRICULAR RATE- MUSE: 134 BPM
WBC # BLD AUTO: 10.3 10E3/UL (ref 4–11)
WBC # BLD AUTO: 12.6 10E3/UL (ref 4–11)

## 2023-06-16 PROCEDURE — 85027 COMPLETE CBC AUTOMATED: CPT | Performed by: STUDENT IN AN ORGANIZED HEALTH CARE EDUCATION/TRAINING PROGRAM

## 2023-06-16 PROCEDURE — 80053 COMPREHEN METABOLIC PANEL: CPT | Performed by: STUDENT IN AN ORGANIZED HEALTH CARE EDUCATION/TRAINING PROGRAM

## 2023-06-16 PROCEDURE — 250N000011 HC RX IP 250 OP 636: Performed by: STUDENT IN AN ORGANIZED HEALTH CARE EDUCATION/TRAINING PROGRAM

## 2023-06-16 PROCEDURE — 84100 ASSAY OF PHOSPHORUS: CPT | Performed by: STUDENT IN AN ORGANIZED HEALTH CARE EDUCATION/TRAINING PROGRAM

## 2023-06-16 PROCEDURE — 250N000011 HC RX IP 250 OP 636: Performed by: ORTHOPAEDIC SURGERY

## 2023-06-16 PROCEDURE — 71275 CT ANGIOGRAPHY CHEST: CPT

## 2023-06-16 PROCEDURE — 97116 GAIT TRAINING THERAPY: CPT | Mod: GP

## 2023-06-16 PROCEDURE — 97110 THERAPEUTIC EXERCISES: CPT | Mod: GP

## 2023-06-16 PROCEDURE — 258N000003 HC RX IP 258 OP 636: Performed by: STUDENT IN AN ORGANIZED HEALTH CARE EDUCATION/TRAINING PROGRAM

## 2023-06-16 PROCEDURE — 93306 TTE W/DOPPLER COMPLETE: CPT | Mod: 26 | Performed by: INTERNAL MEDICINE

## 2023-06-16 PROCEDURE — 93306 TTE W/DOPPLER COMPLETE: CPT

## 2023-06-16 PROCEDURE — 83735 ASSAY OF MAGNESIUM: CPT | Performed by: STUDENT IN AN ORGANIZED HEALTH CARE EDUCATION/TRAINING PROGRAM

## 2023-06-16 PROCEDURE — 85060 BLOOD SMEAR INTERPRETATION: CPT | Performed by: PATHOLOGY

## 2023-06-16 PROCEDURE — 250N000013 HC RX MED GY IP 250 OP 250 PS 637: Performed by: PHYSICIAN ASSISTANT

## 2023-06-16 PROCEDURE — 84484 ASSAY OF TROPONIN QUANT: CPT | Performed by: STUDENT IN AN ORGANIZED HEALTH CARE EDUCATION/TRAINING PROGRAM

## 2023-06-16 PROCEDURE — 120N000001 HC R&B MED SURG/OB

## 2023-06-16 PROCEDURE — 97530 THERAPEUTIC ACTIVITIES: CPT | Mod: GP

## 2023-06-16 PROCEDURE — 36415 COLL VENOUS BLD VENIPUNCTURE: CPT | Performed by: STUDENT IN AN ORGANIZED HEALTH CARE EDUCATION/TRAINING PROGRAM

## 2023-06-16 PROCEDURE — 71250 CT THORAX DX C-: CPT

## 2023-06-16 PROCEDURE — 99232 SBSQ HOSP IP/OBS MODERATE 35: CPT | Performed by: STUDENT IN AN ORGANIZED HEALTH CARE EDUCATION/TRAINING PROGRAM

## 2023-06-16 PROCEDURE — 83605 ASSAY OF LACTIC ACID: CPT | Performed by: STUDENT IN AN ORGANIZED HEALTH CARE EDUCATION/TRAINING PROGRAM

## 2023-06-16 PROCEDURE — 85045 AUTOMATED RETICULOCYTE COUNT: CPT | Performed by: STUDENT IN AN ORGANIZED HEALTH CARE EDUCATION/TRAINING PROGRAM

## 2023-06-16 PROCEDURE — 258N000003 HC RX IP 258 OP 636: Performed by: ORTHOPAEDIC SURGERY

## 2023-06-16 PROCEDURE — 85018 HEMOGLOBIN: CPT | Performed by: STUDENT IN AN ORGANIZED HEALTH CARE EDUCATION/TRAINING PROGRAM

## 2023-06-16 PROCEDURE — 250N000013 HC RX MED GY IP 250 OP 250 PS 637: Performed by: STUDENT IN AN ORGANIZED HEALTH CARE EDUCATION/TRAINING PROGRAM

## 2023-06-16 PROCEDURE — 250N000013 HC RX MED GY IP 250 OP 250 PS 637: Performed by: INTERNAL MEDICINE

## 2023-06-16 RX ORDER — POTASSIUM CHLORIDE 1500 MG/1
20 TABLET, EXTENDED RELEASE ORAL ONCE
Status: COMPLETED | OUTPATIENT
Start: 2023-06-16 | End: 2023-06-16

## 2023-06-16 RX ORDER — ATORVASTATIN CALCIUM 20 MG/1
20 TABLET, FILM COATED ORAL DAILY
Status: DISCONTINUED | OUTPATIENT
Start: 2023-06-16 | End: 2023-06-19 | Stop reason: HOSPADM

## 2023-06-16 RX ORDER — IOPAMIDOL 755 MG/ML
500 INJECTION, SOLUTION INTRAVASCULAR ONCE
Status: COMPLETED | OUTPATIENT
Start: 2023-06-16 | End: 2023-06-16

## 2023-06-16 RX ORDER — MAGNESIUM OXIDE 400 MG/1
400 TABLET ORAL EVERY 4 HOURS
Status: COMPLETED | OUTPATIENT
Start: 2023-06-16 | End: 2023-06-16

## 2023-06-16 RX ORDER — CLINDAMYCIN PHOSPHATE 900 MG/50ML
900 INJECTION, SOLUTION INTRAVENOUS EVERY 8 HOURS
Status: DISCONTINUED | OUTPATIENT
Start: 2023-06-16 | End: 2023-06-17

## 2023-06-16 RX ADMIN — VANCOMYCIN HYDROCHLORIDE 1500 MG: 10 INJECTION, POWDER, LYOPHILIZED, FOR SOLUTION INTRAVENOUS at 09:13

## 2023-06-16 RX ADMIN — CEFEPIME 2 G: 2 INJECTION, POWDER, FOR SOLUTION INTRAVENOUS at 18:59

## 2023-06-16 RX ADMIN — HYDROMORPHONE HYDROCHLORIDE 2 MG: 2 TABLET ORAL at 21:22

## 2023-06-16 RX ADMIN — ACETAMINOPHEN 975 MG: 325 TABLET, FILM COATED ORAL at 12:15

## 2023-06-16 RX ADMIN — Medication 400 MG: at 12:15

## 2023-06-16 RX ADMIN — POLYETHYLENE GLYCOL 3350 17 G: 17 POWDER, FOR SOLUTION ORAL at 09:08

## 2023-06-16 RX ADMIN — SODIUM CHLORIDE, POTASSIUM CHLORIDE, SODIUM LACTATE AND CALCIUM CHLORIDE: 600; 310; 30; 20 INJECTION, SOLUTION INTRAVENOUS at 03:31

## 2023-06-16 RX ADMIN — CLINDAMYCIN PHOSPHATE 900 MG: 900 INJECTION, SOLUTION INTRAVENOUS at 16:40

## 2023-06-16 RX ADMIN — Medication 400 MG: at 09:08

## 2023-06-16 RX ADMIN — HYDROXYZINE HYDROCHLORIDE 25 MG: 25 TABLET, FILM COATED ORAL at 16:41

## 2023-06-16 RX ADMIN — B-COMPLEX W/ C & FOLIC ACID TAB 1 TABLET: TAB at 09:08

## 2023-06-16 RX ADMIN — HYDROXYZINE HYDROCHLORIDE 25 MG: 25 TABLET, FILM COATED ORAL at 09:18

## 2023-06-16 RX ADMIN — ASPIRIN 81 MG: 81 TABLET, COATED ORAL at 19:34

## 2023-06-16 RX ADMIN — SENNOSIDES AND DOCUSATE SODIUM 1 TABLET: 50; 8.6 TABLET ORAL at 19:34

## 2023-06-16 RX ADMIN — CEFEPIME 2 G: 2 INJECTION, POWDER, FOR SOLUTION INTRAVENOUS at 03:44

## 2023-06-16 RX ADMIN — HYDROXYZINE HYDROCHLORIDE 25 MG: 25 TABLET, FILM COATED ORAL at 01:20

## 2023-06-16 RX ADMIN — SODIUM CHLORIDE 87 ML: 9 INJECTION, SOLUTION INTRAVENOUS at 11:42

## 2023-06-16 RX ADMIN — VANCOMYCIN HYDROCHLORIDE 1500 MG: 10 INJECTION, POWDER, LYOPHILIZED, FOR SOLUTION INTRAVENOUS at 21:22

## 2023-06-16 RX ADMIN — CEFEPIME 2 G: 2 INJECTION, POWDER, FOR SOLUTION INTRAVENOUS at 12:15

## 2023-06-16 RX ADMIN — ALLOPURINOL 400 MG: 300 TABLET ORAL at 09:07

## 2023-06-16 RX ADMIN — IOPAMIDOL 69 ML: 755 INJECTION, SOLUTION INTRAVENOUS at 11:39

## 2023-06-16 RX ADMIN — SODIUM CHLORIDE, POTASSIUM CHLORIDE, SODIUM LACTATE AND CALCIUM CHLORIDE 1000 ML: 600; 310; 30; 20 INJECTION, SOLUTION INTRAVENOUS at 14:10

## 2023-06-16 RX ADMIN — HYDROMORPHONE HYDROCHLORIDE 2 MG: 2 TABLET ORAL at 14:45

## 2023-06-16 RX ADMIN — CLINDAMYCIN PHOSPHATE 900 MG: 900 INJECTION, SOLUTION INTRAVENOUS at 23:58

## 2023-06-16 RX ADMIN — SODIUM CHLORIDE, POTASSIUM CHLORIDE, SODIUM LACTATE AND CALCIUM CHLORIDE: 600; 310; 30; 20 INJECTION, SOLUTION INTRAVENOUS at 14:10

## 2023-06-16 RX ADMIN — SENNOSIDES AND DOCUSATE SODIUM 1 TABLET: 50; 8.6 TABLET ORAL at 09:09

## 2023-06-16 RX ADMIN — ASPIRIN 81 MG: 81 TABLET, COATED ORAL at 09:07

## 2023-06-16 RX ADMIN — ACETAMINOPHEN 650 MG: 325 TABLET, FILM COATED ORAL at 16:41

## 2023-06-16 RX ADMIN — HYDROMORPHONE HYDROCHLORIDE 2 MG: 2 TABLET ORAL at 01:20

## 2023-06-16 RX ADMIN — HYDROMORPHONE HYDROCHLORIDE 2 MG: 2 TABLET ORAL at 09:18

## 2023-06-16 RX ADMIN — POTASSIUM CHLORIDE 20 MEQ: 1500 TABLET, EXTENDED RELEASE ORAL at 09:08

## 2023-06-16 RX ADMIN — ACETAMINOPHEN 650 MG: 325 TABLET, FILM COATED ORAL at 23:58

## 2023-06-16 RX ADMIN — INSULIN ASPART 1 UNITS: 100 INJECTION, SOLUTION INTRAVENOUS; SUBCUTANEOUS at 18:56

## 2023-06-16 RX ADMIN — ACETAMINOPHEN 975 MG: 325 TABLET, FILM COATED ORAL at 03:45

## 2023-06-16 ASSESSMENT — ACTIVITIES OF DAILY LIVING (ADL)
ADLS_ACUITY_SCORE: 22
ADLS_ACUITY_SCORE: 21
DEPENDENT_IADLS:: INDEPENDENT
ADLS_ACUITY_SCORE: 22
ADLS_ACUITY_SCORE: 21
ADLS_ACUITY_SCORE: 22
ADLS_ACUITY_SCORE: 22

## 2023-06-16 NOTE — CONSULTS
"BRIEF NUTRITION ASSESSMENT      REASON FOR ASSESSMENT:  Thejanuary Senior is a 43 year old male seen by Registered Dietitian for Provider Order - \"rule out malnutrition\"    Pt is POD#3 s/p elective left TRACI. PMH significant for diabetes which previously has been very poorly controlled but more recently he is fairly well controlled, PTSD    NUTRITION HISTORY:  Pt follows a regular diet at home. He reports no concerns regarding intake PTA or weight.    CURRENT DIET AND INTAKE:  Diet:  Regular            Intake: % of meals    ANTHROPOMETRICS:  Height: 5' 8.9\"  Weight:  207 lbs 4.8 oz  Body mass index is 30.7 kg/m .   Weight Status: Obesity Grade I BMI 30-34.9  IBW:  72.7 kg  Weight History: weights up and down over past year, no concerns per patient or chart review  Wt Readings from Last 20 Encounters:   06/13/23 94 kg (207 lb 4.8 oz)   05/19/23 96.2 kg (212 lb)   05/03/23 96.2 kg (212 lb)   03/03/23 97 kg (213 lb 12 oz)   12/23/22 98.1 kg (216 lb 4.8 oz)   12/12/22 97.6 kg (215 lb 4 oz)   08/17/22 93.9 kg (207 lb)   07/26/22 93.4 kg (206 lb)   07/15/22 95.3 kg (210 lb)   07/08/22 93.4 kg (206 lb)   06/29/22 95 kg (209 lb 8 oz)   04/20/22 96.4 kg (212 lb 8 oz)     LABS:  Labs noted      Electrolytes  Sodium (mmol/L)   Date Value   06/16/2023 135 (L)     Potassium (mmol/L)   Date Value   06/16/2023 3.6   04/14/2022 3.9     Magnesium (mg/dL)   Date Value   06/16/2023 1.6 (L)     Phosphorus (mg/dL)   Date Value   06/16/2023 3.7    Renal  Urea Nitrogen (mg/dL)   Date Value   06/16/2023 6.4   04/14/2022 9     Creatinine (mg/dL)   Date Value   06/16/2023 0.73     Inflammatory Markers  CRP Inflammation (mg/L)   Date Value   12/23/2022 9.08 (H)     WBC Count (10e3/uL)   Date Value   06/16/2023 12.6 (H)     Albumin (g/dL)   Date Value   06/16/2023 2.4 (L)   04/14/2022 3.8      Blood Glucose  Glucose (mg/dL)   Date Value   04/14/2022 373 (H)     GLUCOSE BY METER POCT (mg/dL)   Date Value   06/16/2023 136 (H)     Hemoglobin A1C " (%)   Date Value   05/19/2023 7.2 (H)   12/12/2022 7.6 (H)   08/17/2022 6.9 (H)    Hepatic  ALT (U/L)   Date Value   06/16/2023 8     AST (U/L)   Date Value   06/16/2023 31     Alkaline Phosphatase (U/L)   Date Value   06/16/2023 72     Bilirubin Total (mg/dL)   Date Value   06/16/2023 0.5    Additional  Triglycerides (mg/dL)   Date Value   12/12/2022 509 (H)     Ketones Urine (mg/dL)   Date Value   06/15/2023 80 (A)          MALNUTRITION:  Patient does not meet two of the following criteria necessary for diagnosing malnutrition.     % Weight Loss:  Weight loss does not meet criteria for malnutrition  % Intake:  No decreased intake noted  Subcutaneous Fat Loss:  None observed  Muscle Loss:  None observed  Fluid Retention:  None noted    NUTRITION INTERVENTION:  Nutrition Diagnosis:  No nutrition diagnosis at this time.    Implementation:  Nutrition Education: Per Provider order if indicated    FOLLOW UP/MONITORING:   Will re-evaluate in 7 - 10 days, or sooner, if re-consulted.    Rosie Cotton, SHASTA, LD, [facility]  Pager - 3rd floor/ICU: [phone]  Pager - All other floors: [phone]  Pager - Weekend/holiday: [phone]  Office: [phone]

## 2023-06-16 NOTE — PROGRESS NOTES
[facility]  Orthopedics Progress Note             Interval History:     43 year old male admitted postoperatively for elective left total hip arthroplasty with Dr. Orozco.  There were no intraoperative complications.  Patient currently Post op day #3.    Rounding today via chart review and nurse huddle.      Noted CT findings, did review with Dr. Orozco as well.  Not concerning.  Normal to have air in wound several days postop.    Hgb 9.3, moderate tachy, BP stable.  Noted Lactic acid and fever.    Pain: tolerable per nursing..  Nausea: none per record  Light headedness : improved per nursing.  Chest pain: none  Shortness of breath: O2 NC over night due SULTANA.              Assessment and Plan:     S/P left IVONNE: day #3.  Above findings.  No overt causes of condition per ortho, no signs of ongoing hemorrhage at surgical site.  Very unlikely fulminant infection at hip post op day #1-3.  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    Plan:  hospitalist input helpful.  SW coordination discharge.  Nursing cares.    PT/OT as tolerate with hip restrictions.    Discharge pending.                  Physical Exam:   Patient Vitals for the past 12 hrs:   BP Temp Temp src Pulse Resp SpO2   06/16/23 1044 127/81 99.2  F (37.3  C) Temporal (!) 124 16 100 %   06/16/23 0742 117/77 98.3  F (36.8  C) Temporal 115 18 100 %     Hemoglobin   Date Value Ref Range Status   06/16/2023 9.3 (L) 13.3 - 17.7 g/dL Final   06/16/2023 8.6 (L) 13.3 - 17.7 g/dL Final       Per nursing.  Patient is alert and oriented.  Neurovascular status: Grossly intact to LLE as ambulating.  Dressing: clean and dry  Calves: soft and non tender    No ecchymosis, significant hematoma signs.    Recent Results (from the past 24 hour(s))   XR Chest 2 Views    Narrative    EXAM: XR CHEST 2 VIEWS  LOCATION: [facility]  DATE: 6/15/2023    INDICATION: Lung crackles and fever.  COMPARISON: 06/15/2023.      Impression    IMPRESSION: Negative chest.  Lungs are clear.       CT Chest Abdomen Pelvis w/o Contrast    Narrative    CT CHEST ABDOMEN PELVIS WITHOUT CONTRAST 6/16/2023 10:08 AM    CLINICAL HISTORY: Rule out postop intra-abdominal vs other bleed,  tachycardic, hypotensive with continued down trending HGB despite  transfusion and resuscitation.    TECHNIQUE: CT scan of the chest, abdomen, and pelvis was performed  without IV contrast. Multiplanar reformats were obtained. Dose  reduction techniques were used.   CONTRAST: None.    COMPARISON: Chest x-ray 6/15/2023.    FINDINGS:   LUNGS AND PLEURA: No effusions or pneumothorax. No acute airspace  disease. A few incidental small pulmonary nodules noted. An example at  the right upper lobe is solid measuring 3 mm image 100. There are  other small examples.    MEDIASTINUM/AXILLAE: No adenopathy or acute mediastinal abnormality at  unenhanced scanning. No fluid collection.    CORONARY ARTERY CALCIFICATION: Minimal    HEPATOBILIARY: Hepatic steatosis suggested. No acute abnormality.  Gallbladder unremarkable.    PANCREAS: Normal.    SPLEEN: Normal.    ADRENAL GLANDS: Normal.    KIDNEYS/BLADDER: Nonobstructing stone left kidney measuring 4 mm  series 3 image 156. No hydronephrosis. Normal urinary bladder.    BOWEL: No obstruction. Normal appendix. No acute inflammatory change.  No free air.    LYMPH NODES: Normal.    VASCULATURE: Unremarkable.    PELVIC ORGANS: No intrapelvic mass or fluid collection identified.    MUSCULOSKELETAL: Atrophy of the left psoas. Left total hip  arthroplasty appears anatomically aligned. Prominent fluid and edema  at the operative site along with bubbles of gas noted. Remainder of  the osseous and muscular structures shows no evidence for hematoma or  acute abnormality.      Impression    IMPRESSION:  1.  Postoperative left hip arthroplasty with prominent edema and fluid  at the operative site along with bubbles of gas. Correlate with any  infectious etiology at this location, but this  could just be  postoperative evolving blood products and fluid.  2.  No other acute abnormality identified. No additional sites of  hematoma or inflammatory change.  3.  A few small indeterminate pulmonary nodules, see below for follow  up.  4.  Small nonobstructing stone within the left kidney.    Recommendations for one or multiple incidental lung nodules < 6mm:    Low risk patients: No routine follow-up.    High risk patients: Optional follow-up CT at 12 months; if  unchanged, no further follow-up.    *Low Risk: Minimal or absent history of smoking or other known risk  factors.  *Nonsolid (ground glass) or partly solid nodules may require longer  follow-up to exclude indolent adenocarcinoma.  *Recommendations based on Guidelines for the Management of Incidental  Pulmonary Nodules Detected at CT: From the Fleischner Society 2017,  Radiology 2017.              Carlin Lee PA-C  [facility] - Surgery    6/16/2023

## 2023-06-16 NOTE — PROGRESS NOTES
Notified by daytime physician that patient had new fever. Upon arrival to room, he was resting in bed. He was awake, alert, interactive. Diaphoretic. Tachycardic. Lungs with bibasilar crackles. Reportedly had received a significant amount of fluids for fever and tachycardia. Blood pressures borderline with 100s/50s. UA bland. BCx obtained earlier in the day have not yet resulted. WBC 13.2-->10.8, but patient does appear to be approaching sepsis on exam. Ordered vancomycin + cefepime, though source is currently unknown. Obtaining CXR. Continuing to monitor.    Odilon Damon MD  Hospitalist

## 2023-06-16 NOTE — PROVIDER NOTIFICATION
Page sent to GERARDO Mckee with ortho regarding CT results.    Addendum 1109-Dr. Orozco not concerned about hip CT

## 2023-06-16 NOTE — PLAN OF CARE
Goal Outcome Evaluation:      Plan of Care Reviewed With: patient    Overall Patient Progress: improving    Patient vital signs are at baseline: No,  Reason:  T 100.2, sinus tachy, BP's improved   Patient able to ambulate as they were prior to admission or with assist devices provided by therapies during their stay:  No,  Reason:  Very weak, up with 2A, GB, walker   Patient MUST void prior to discharge:  Yes  Patient able to tolerate oral intake:  Yes, no nausea or vomiting   Pain has adequate pain control using Oral analgesics:  Yes, pain managed with scheduled Tylenol, PRN Dilaudid and Atarax   Does patient have an identified :  Yes, spouse  Has goal D/C date and time been discussed with patient:  No,  Reason:  TBD. Therapy recommending TCU    Pt is alert & oriented x 4. Chest x-ray done, unremarkable. CMS intact. Continuous cardiac monitoring.  IV ABX's infused per orders. IV fluids running.

## 2023-06-16 NOTE — PHARMACY-VANCOMYCIN DOSING SERVICE
History     Chief Complaint   Patient presents with     Head Injury     HPI    History obtained from mother    Martine is a 1 year old male who presents at  8:30 PM with mother for evaluation of nasal swelling and a cut on his nose. Last night he was running around with his cousin. Mother did not see his injury, but thinks he ran into a wall, striking the bridge of his nose against the corner. He had bleeding over the bridge of his nose as well as a nose bleed from both nostrils. Mother cleaned his face and bleeding resolved quickly. He seemed to be doing well and the cut had stopped bleeding so did not seek medical evaluation. This morning his nose is more swollen, does not appear to be crooked. Mother thinks his nose is more painful today, unsure if it is pain from the cut or from his nose. No redness around the cut, no purulent discharge. No further nose bleeds. Not having difficulty breathing through his nose, although she notes he often breathes through his mouth (has enlarged adenoids). He has not had headache. No other facial swelling. Today he also had fever up to 100.5F at home, tylenol given this morning. He has clear rhinorrhea and mild cough, no increased work of breathing. No ear pain, sore throat, mouth sores, vomiting, abdominal pain, diarrhea, rashes. His baby sister was seen in our ED a few nights ago and tested positive for RSV, covid/flu testing were negative. No .     PMHx:  History reviewed. No pertinent past medical history.  History reviewed. No pertinent surgical history.  These were reviewed with the patient/family.    MEDICATIONS were reviewed and are as follows:   No current facility-administered medications for this encounter.     Current Outpatient Medications   Medication     acetaminophen (TYLENOL) 32 mg/mL liquid     ibuprofen (ADVIL/MOTRIN) 100 MG/5ML suspension     ALLERGIES:  Patient has no known allergies.    IMMUNIZATIONS:  UTD by report except no MMR.    SOCIAL  Pharmacy Vancomycin Initial Note  Date of Service Deborah 15, 2023  Patient's  1980  43 year old, male    Indication: Postoperative Infection    Current estimated CrCl = Estimated Creatinine Clearance: 131.8 mL/min (based on SCr of 0.8 mg/dL).    Creatinine for last 3 days  2023:  6:23 PM Creatinine 0.87 mg/dL  6/15/2023:  6:16 AM Creatinine 0.80 mg/dL    Recent Vancomycin Level(s) for last 3 days  No results found for requested labs within last 3 days.      Vancomycin IV Administrations (past 72 hours)      No vancomycin orders with administrations in past 72 hours.                Nephrotoxins and other renal medications (From now, onward)    Start     Dose/Rate Route Frequency Ordered Stop    06/15/23 1930  vancomycin (VANCOCIN) 1,500 mg in 0.9% NaCl 250 mL intermittent infusion         1,500 mg  over 90 Minutes Intravenous EVERY 12 HOURS 06/15/23 1920      23 0800  lisinopril (ZESTRIL) tablet 2.5 mg        Note to Pharmacy: PTA Sig:Take 1 tablet (2.5 mg) by mouth daily      2.5 mg Oral DAILY 23 1617            Contrast Orders - past 72 hours (72h ago, onward)    None          [employer] Prediction of Planned Initial Vancomycin Regimen  Regimen: 1500 mg IV every 12 hours.  Start time: 19:21 on 06/15/2023  Exposure target: AUC24 (range)400-600 mg/L.hr   AUC24,ss: 548 mg/L.hr  Probability of AUC24 > 400: 81 %  Ctrough,ss: 16.5 mg/L  Probability of Ctrough,ss > 20: 35 %  Probability of nephrotoxicity (Lodise [place] ): 12 %        Plan:  1. Start vancomycin  1500 mg IV q12h.   2. Vancomycin monitoring method: AUC  3. Vancomycin therapeutic monitoring goal: 400-600 mg*h/L  4. Pharmacy will check vancomycin levels as appropriate in 1-3 Days.    5. Serum creatinine levels will be ordered daily for the first week of therapy and at least twice weekly for subsequent weeks.      Dany Lezama [facility]   HISTORY: Martine lives with parents and siblings.  He does not go to school or .    I have reviewed the Medications, Allergies, Past Medical and Surgical History, and Social History in the Epic system.    Review of Systems  Please see HPI for pertinent positives and negatives.  All other systems reviewed and found to be negative.      Physical Exam   Pulse: 109  Temp: 97.4  F (36.3  C)  Resp: 26  Weight: 14.3 kg (31 lb 8.4 oz)  SpO2: 100 %       Physical Exam  Appearance: Alert and appropriate, well developed, nontoxic, with moist mucous membranes.  HEENT: Head: Normocephalic. Eyes: PERRL, EOM intact, conjunctivae and sclerae clear. Ears: Tympanic membranes clear bilaterally, without inflammation or effusion. No hemotympanum. Nose: Nares clear with clear discharge, no epistaxis. Approximately 0.5cm linear laceration over bridge of nose, laceration is scabbed over, wound edges approximated. Bridge of nose is symmetrically swollen, tenderness with palpation over laceration on bridge of nose, no active bleeding or discharge, no surrounding erythema. No septal hematoma. Mouth/Throat: No oral lesions, pharynx clear with no erythema or exudate.  Neck: Supple, no masses, no meningismus.   Pulmonary: No grunting, flaring, retractions or stridor. Good air entry, clear to auscultation bilaterally, with no rales, rhonchi, or wheezing.  Cardiovascular: Regular rate and rhythm, normal S1 and S2, with no murmurs.  Normal symmetric peripheral pulses and brisk cap refill.  Abdominal: Normal bowel sounds, soft, nontender, nondistended, with no masses and no hepatosplenomegaly.  Neurologic: Alert and interactive, cranial nerves II-XII grossly intact, moving all extremities equally with grossly normal coordination.  Extremities/Back: No deformity.  Skin: No significant rashes, ecchymoses.  Genitourinary: Deferred  Rectal: Deferred    ED Course     Procedures    No results found for this or any previous visit (from the past  24 hour(s)).    Medications - No data to display    History obtained from family.    Critical care time:  none    Assessments & Plan (with Medical Decision Making)   Martine is a 1 year old male who presents for evaluation of cut and swelling of his nose and cough/rhinorrhea for 1 day. He is well appearing on arrival, vitals within normal limits and is afebrile after recent antipyretics. The laceration on his nose is >24 hours old, the edges are approximated with a scab, with the age of the wound and that it is approximated, does not require repair. His nose is symmetrically swollen, does not appear crooked. No septal hematoma. No epistaxis. Discussed with mother that given mechanism of injury, less likely to have broken his nose. Reviewed wound care with mother and she will follow up with ENT in clinic in 4-5 days if nose appears crooked or still having significant pain after swelling resolves. Cough and congestion are likely secondary to viral URI, possibly RSV as baby sister tested positive for RSV earlier this week. Her covid/flu test were negative. He does not have evidence of pneumonia, croup, wheezing, acute otitis media, strep pharyngitis on exam. Low suspicion for serious bacterial infection. Appears well hydrated and drinking well at home. Discussed supportive cares and return precautions with family.     PLAN  Discharge home  Tylenol or ibuprofen as needed for fever or discomfort  Keep cut on nose dry and clean, Bacitracin BID while healing  Encourage fluids to maintain hydration  Follow up with PCP in 2-3 days if not improving  Follow up with ENT in 4-5 days when swelling improves if nose is crooked or painful, phone number provided to family  Discussed return precautions with family including persistent fevers, increased work of breathing, not tolerating oral intake, increasing redness, swelling, purulent discharge from cut on nose,     I have reviewed the nursing notes.    I have reviewed the  findings, diagnosis, plan and need for follow up with the patient.  New Prescriptions    No medications on file       Final diagnoses:   Viral URI with cough   Swelling of nose   Abrasion, nose without infection       8/14/2022   [facility] EMERGENCY DEPARTMENT     Rea Potts MD  08/14/22 [id number]

## 2023-06-16 NOTE — PROGRESS NOTES
[facility]  Medicine Progress Note - Hospitalist Service  Date of Admission:  6/13/2023    Assessment & Plan   Mr. Cora Senior is a 43 year old man native to [place] [facility] who was admitted for Right IVONNE. Medicine service was consulted to assist with comanagement of chronic medical problems in addition to sinus tachycardia, postsurgical blood loss anemia and now fever with concern for sepsis with unclear source. Pending ongoing clinical improvement.       Assessment and plan:   s/p right IVONNE 6/14   Medical history is very complicated by social history.  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 Hx of multiple hip surgeries following a hip injury at age 13.  His prior surgeries were complex and incompletely beneficial.  The patient really has been unable to bear weight on his left lower extremity for years and he indicates that he really has no muscle function on left lower extremity.  -Orthopedics management as primary team    -Gram stain of tissue with no organisms seen but 4+ white blood cells and 2+ PMNs, otherwise cultures negative at this time  -CT scan 06/16/23 with prominent edema and fluid at the operative site along with bubbles of gas. Correlate with any infectious etiology at this location, but this could just be postoperative evolving blood products and fluid   -Orthopedics aware and not planning on seeing the patient today, not concerned with above findings     Postoperative fever, resolved   Concern for evolving sepsis of unclear source    Leukocytosis, tachycardia, hypotension, lactic acidosis   Suspected fever 6/14 evening postoperatively, UA was without evidence of infection, chest x-ray without any obvious infiltrate, ultrasound of the lower extremities with no DVTs, However procal was elevated at 0.30. CT abdomen and pelvis with no obvious findings as cause of fever. CT PE negative for PE or other process.  Again, no evidence of pneumonia, hypoxia or respiratory infection.  -Blood cultures with no growth after 1 day  -started on cefepime and vancomycin given fever on 6/15   -If has a recurrent fever, will obtain a repeat set of blood cultures, consider adding clindamycin, repage primary team which is orthopedic   -consider infectious disease consult if continues to have fever of unknown origin    Sinus tachycardia following surgery, ongoing   Suspect due to decreased intravascular volume although now considering sepsis of unclear source. IV fluid bolus 06/15/23 , transfusion 6/15, minimal response. EKG with sinus tachycardia, non specific t wave change. Troponin within normal limits, ACS very unlikely. CT PE negative 06/16/23. CT chest abdomen pelvis 06/16/23 with no evidence of hematoma or other blood loss to explain tachycardia  -Continue cardiac monitoring with ongoing tachycardia   -IVF to continue , additional IV bolus on 6/16   -other considerations include medication side effect, withdrawal, malnutrition with hypoalbuminemia?  Although regardless does not explain fevers and hypotension    Acute postsurgical blood loss anemia, persistent   Concern for alternative source of ongoing bleeding   He indicates that he has had multiple transfusions following prior surgeries though its not clear that he had massive bleeding problems.  I note that in the past his hemoglobin values have been on the high side (running between 15 and 17).   Pre-op Hgb 15.9 (on 5/19/23).  Note that iron studies are equivocal, folate is low, vitamin B12 is relatively low. Consider possible underlying malnutrition.  Empirically started B complex vitamin with vitamin C. HGB 06/15/23 9.1, transfusion 06/15/23 as remains cardiac and symptomatic with fatigue, symptom limited PT, HGB 8.6 the day following transfusion.  -CT chest abdomen pelvis thought evidence of intra-abdominal bleed or hematoma, no evidence of infection either  -CBC in  "a.m.  -peripheral smear pending     Non insulin dependent diabetes mellitus   -hold PTA Metformin as is  -Insulin sliding scale, BG well controlled     Moderately decreased functional cardiac capacity for age.   TTE performed on 3/20/23 with normal stress echocardiogram without evidence of stress-induced ischemia, EF at 55 to 60%, stress testing without any anginal chest pain, moderately reduced functional capacity for age noted.   -Repeat TTE obtained on 6/16 given fevers of unexplained origin and ongoing tachycardia, rate is pending    Hypo-magnesium-replace as needed  Torx-hyhzwqlwceh-sebgt, work on nutrition    Chronic and stable:   Chronic Gout - PTA allopurinol  HTN   SULTANA. -On BiPAP  Hypomagnesemia-replace as needed  Hypoalbuminemia-noted on admission, nutrition evaluation pending  History of tobacco abuse without previously documented lung disease.  Hx tobacco abuse. Quit in 2022   Obesity-BMI 30.7    Incidental findings:   Pulmonary nodules-incidentally noted  Small nonobstructing stone of the left kidney-incidentally noted         Diet: Discharge Instruction - Regular Diet Adult  High Kcal/High Protein Diet, ADULT    DVT Prophylaxis: Pneumatic Compression Devices  Malhotra Catheter: Not present  Lines: None     Cardiac Monitoring: ACTIVE order. Indication: Tachyarrhythmias, acute (48 hours)  Code Status: Full Code      Clinically Significant Risk Factors            # Hypomagnesemia: Lowest Mg = 1.6 mg/dL in last 2 days, will replace as needed   # Hypoalbuminemia: Lowest albumin = 2.4 g/dL at 6/16/2023  6:40 AM, will monitor as appropriate  # Coagulation Defect: INR = 1.20 (Ref range: 0.85 - 1.15) and/or PTT = N/A, will monitor for bleeding          # DMII: A1C = 7.2 % (Ref range: 0.0 - 5.6 %) within past 6 months, PRESENT ON ADMISSION  # Obesity: Estimated body mass index is 30.7 kg/m  as calculated from the following:    Height as of this encounter: 1.75 m (5' 8.9\").    Weight as of this encounter: 94 kg (207 " lb 4.8 oz)., PRESENT ON ADMISSION          Disposition Plan      Expected Discharge Date: 06/19/2023                  Sierra Lawrence DO  Hospitalist Service  [facility]  Securely message with [employer] (more info)  Text page via [employer] Paging/Directory   ______________________________________________________________________    Interval History   Continues to feel fatigued. He did have a fever greater than 101 on the evening of 6/14 and again on 6/15, no evidence of active infection at this time, work up as above     Continues to have hip pain but feels that is stable, he denies any numbness or tingling, he has had difficulty with exercise given his symptoms of fatigue in addition to tachycardia    Does not report any chest pain, pressure or shortness of breath.  He reports no other concerns or complaints today.  Significant other is at bedside and helping with feeding during interview today.    Physical Exam   Vital Signs: Temp: 99.6  F (37.6  C) Temp src: Temporal BP: 127/81 Pulse: (!) 124   Resp: 16 SpO2: 100 % O2 Device: None (Room air) Oxygen Delivery: 1 LPM  Weight: 207 lbs 4.8 oz    Constitutional: awake, fatigued appearing male, cooperative, no apparent distress, and appears stated age   HEENT: Normocephalic, atraumatic  Respiratory: Normal work of breathing, good air exchange, clear to auscultation bilaterally, no crackles or wheezing noted  Cardiovascular: Regular rate and rhythm, no murmurs appreciated  GI: Soft and nontender to palpation, nondistended, no rebound or guarding  Skin: normal skin color, texture, turgor  Wound VAC over left hip, clean and dry with no purulent drainage  Musculoskeletal: No deformities, no edema present  Neurologic: Alert and oriented, no focal deficits   Neuropsychiatric: General: normal, calm and normal eye contact      Data   ------------------------- PAST 24 HR DATA REVIEWED -----------------------------------------------    I have personally  reviewed the following data over the past 24 hrs:    12.6 (H)  \   8.3 (L)   / 184     135 (L) 102 6.4 /  136 (H)   3.6 23 0.73 \       ALT: 8 AST: 31 AP: 72 TBILI: 0.5   ALB: 2.4 (L) TOT PROTEIN: 5.7 (L) LIPASE: N/A       Trop: 6 BNP: N/A       Procal: N/A CRP: N/A Lactic Acid: 2.1 (H)       Ferritin:  N/A % Retic:  3.0 (H) LDH:  N/A       Imaging results reviewed over the past 24 hrs:   Recent Results (from the past 24 hour(s))   XR Chest 2 Views    Narrative    EXAM: XR CHEST 2 VIEWS  LOCATION: [facility]  DATE: 6/15/2023    INDICATION: Lung crackles and fever.  COMPARISON: 06/15/2023.      Impression    IMPRESSION: Negative chest. Lungs are clear.       CT Chest Abdomen Pelvis w/o Contrast    Narrative    CT CHEST ABDOMEN PELVIS WITHOUT CONTRAST 6/16/2023 10:08 AM    CLINICAL HISTORY: Rule out postop intra-abdominal vs other bleed,  tachycardic, hypotensive with continued down trending HGB despite  transfusion and resuscitation.    TECHNIQUE: CT scan of the chest, abdomen, and pelvis was performed  without IV contrast. Multiplanar reformats were obtained. Dose  reduction techniques were used.   CONTRAST: None.    COMPARISON: Chest x-ray 6/15/2023.    FINDINGS:   LUNGS AND PLEURA: No effusions or pneumothorax. No acute airspace  disease. A few incidental small pulmonary nodules noted. An example at  the right upper lobe is solid measuring 3 mm image 100. There are  other small examples.    MEDIASTINUM/AXILLAE: No adenopathy or acute mediastinal abnormality at  unenhanced scanning. No fluid collection.    CORONARY ARTERY CALCIFICATION: Minimal    HEPATOBILIARY: Hepatic steatosis suggested. No acute abnormality.  Gallbladder unremarkable.    PANCREAS: Normal.    SPLEEN: Normal.    ADRENAL GLANDS: Normal.    KIDNEYS/BLADDER: Nonobstructing stone left kidney measuring 4 mm  series 3 image 156. No hydronephrosis. Normal urinary bladder.    BOWEL: No obstruction. Normal appendix. No acute  inflammatory change.  No free air.    LYMPH NODES: Normal.    VASCULATURE: Unremarkable.    PELVIC ORGANS: No intrapelvic mass or fluid collection identified.    MUSCULOSKELETAL: Atrophy of the left psoas. Left total hip  arthroplasty appears anatomically aligned. Prominent fluid and edema  at the operative site along with bubbles of gas noted. Remainder of  the osseous and muscular structures shows no evidence for hematoma or  acute abnormality.      Impression    IMPRESSION:  1.  Postoperative left hip arthroplasty with prominent edema and fluid  at the operative site along with bubbles of gas. Correlate with any  infectious etiology at this location, but this could just be  postoperative evolving blood products and fluid.  2.  No other acute abnormality identified. No additional sites of  hematoma or inflammatory change.  3.  A few small indeterminate pulmonary nodules, see below for follow  up.  4.  Small nonobstructing stone within the left kidney.    Recommendations for one or multiple incidental lung nodules < 6mm:    Low risk patients: No routine follow-up.    High risk patients: Optional follow-up CT at 12 months; if  unchanged, no further follow-up.    *Low Risk: Minimal or absent history of smoking or other known risk  factors.  *Nonsolid (ground glass) or partly solid nodules may require longer  follow-up to exclude indolent adenocarcinoma.  *Recommendations based on Guidelines for the Management of Incidental  Pulmonary Nodules Detected at CT: From the Fleischner Society 2017,  Radiology 2017.     BONY TAY MD         SYSTEM ID:  [id number]   CT Chest Pulmonary Embolism w Contrast    Narrative    CT CHEST PULMONARY EMBOLISM WITH CONTRAST 6/16/2023 11:50 AM    CLINICAL HISTORY: Evaluate for pulmonary embolism. Postoperative with  ongoing tachycardia, leukocytosis.    TECHNIQUE: CT angiogram chest during arterial phase injection IV  contrast. 2D and 3D MIP reconstructions were performed by the  CT  technologist. Dose reduction techniques were used.     CONTRAST: 69mL Isovue-370    COMPARISON: None.    FINDINGS:  ANGIOGRAM CHEST: Pulmonary arteries are normal caliber and negative  for pulmonary emboli. Thoracic aorta is negative for dissection. No CT  evidence of right heart strain.    LUNGS AND PLEURA: No effusions. No evidence for pneumothorax. Stable  small pulmonary nodules, with an example measuring 3 mm right upper  lobe series 7 image 94. There are other stable small examples. No  acute lobar consolidation.    MEDIASTINUM/AXILLAE: No adenopathy. No acute mediastinal abnormality.  No mediastinal fluid.    CORONARY ARTERY CALCIFICATION: None.    UPPER ABDOMEN: Fatty liver. No acute upper abdominal abnormality.    MUSCULOSKELETAL: Unremarkable. No acute abnormality or aggressive  process.      Impression    IMPRESSION:  1. No evidence for pulmonary embolism, acute thoracic aortic  abnormality, or acute airspace disease.  2. A few small incidental pulmonary nodules.    Recommendations for one or multiple incidental lung nodules < 6mm :    Low risk patients: No routine follow-up.    High risk patients: Optional follow-up CT at 12 months; if  unchanged, no further follow-up.    *Low Risk: Minimal or absent history of smoking or other known risk  factors.  *Nonsolid (ground glass) or partly solid nodules may require longer  follow-up to exclude indolent adenocarcinoma.  *Recommendations based on Guidelines for the Management of Incidental  Pulmonary Nodules Detected at CT: From the Fleischner Society 2017,  Radiology 2017.    BONY TAY MD         SYSTEM ID:  [id number]

## 2023-06-16 NOTE — PLAN OF CARE
Vital today been tachy most of the day 110-140s md aware and on tele. Also been having fevers all day, tylenol give. Lactic last was 2.1. hgb 8.3  Mg and K replaced today.  Bolus currently running now.  Lung sounds clear.    +BS +flatus.  Tolerating diet.   Voiding using bedside urinal.   CMS+  Blood sugars stable today - no coverage needed   Pain controlled. Using oral Dilaudid and atarax for pain control.   Dressing changed today and small area at the top warm to touch and redden. Also area noted by the middle of incision hard- could be scare tissue- continue to monitor and drainage if noted. MD aware.   Moves with assist of 1 and walker.  Alert and oriented pleasant.   Many tests done today to try and r/o issues going on but nothing came back alarming. Ortho notified as well.   Discharge plan: pending  Will continue to monitor.  Goal Outcome Evaluation:      Plan of Care Reviewed With: patient

## 2023-06-16 NOTE — CONSULTS
Care Management Initial Consult    General Information  Assessment completed with: Patient, VM-chart review,    Type of CM/SW Visit: Initial Assessment    Primary Care Provider verified and updated as needed:     Readmission within the last 30 days:        Reason for Consult: discharge planning  Advance Care Planning:            Communication Assessment  Patient's communication style: spoken language (English or Bilingual)    Hearing Difficulty or Deaf: no   Wear Glasses or Blind: no    Cognitive  Cognitive/Neuro/Behavioral: WDL  Level of Consciousness: lethargic  Arousal Level: arouses to voice  Orientation: oriented x 4  Mood/Behavior: calm, cooperative  Best Language: 0 - No aphasia  Speech: clear, spontaneous    Living Environment:   People in home: spouse, child(juliette), dependent  Thazin (spouse)  Current living Arrangements: apartment      Able to return to prior arrangements:  (after TCU)       Family/Social Support:  Care provided by: spouse/significant other, self  Provides care for:    Marital Status:   Wife, Children          Description of Support System:           Current Resources:   Patient receiving home care services: No     Community Resources: None  Equipment currently used at home: none  Supplies currently used at home: None    Employment/Financial:  Employment Status:          Financial Concerns:             Does the patient's insurance plan have a 3 day qualifying hospital stay waiver?  No    Lifestyle & Psychosocial Needs:  Social Determinants of Health     Tobacco Use: Medium Risk (6/14/2023)    Patient History      Smoking Tobacco Use: Former      Smokeless Tobacco Use: Never      Passive Exposure: Past   Alcohol Use: Not At Risk (12/14/2022)    AUDIT-C      Frequency of Alcohol Consumption: Never      Average Number of Drinks: Patient does not drink      Frequency of Binge Drinking: Never   Financial Resource Strain: Low Risk  (12/14/2022)    Overall Financial Resource Strain (CARDIA)       Difficulty of Paying Living Expenses: Not very hard   Food Insecurity: No Food Insecurity (12/14/2022)    Hunger Vital Sign      Worried About Running Out of Food in the Last Year: Never true      Ran Out of Food in the Last Year: Never true   Transportation Needs: No Transportation Needs (12/14/2022)    PRAPARE - Transportation      Lack of Transportation (Medical): No      Lack of Transportation (Non-Medical): No   Physical Activity: Insufficiently Active (12/14/2022)    Exercise Vital Sign      Days of Exercise per Week: 3 days      Minutes of Exercise per Session: 30 min   Stress: No Stress Concern Present (12/14/2022)    [employer] - Occupational Stress Questionnaire      Feeling of Stress : Only a little   Social Connections: Not on file   Intimate Partner Violence: Not At Risk (12/14/2022)    Humiliation, Afraid, Rape, and Kick questionnaire      Fear of Current or Ex-Partner: No      Emotionally Abused: No      Physically Abused: No      Sexually Abused: No   Depression: At risk (3/3/2023)    PHQ-2      PHQ-2 Score: 3   Housing Stability: Low Risk  (12/14/2022)    Housing Stability Vital Sign      Unable to Pay for Housing in the Last Year: No      Number of Places Lived in the Last Year: 2      Unstable Housing in the Last Year: No       Functional Status:  Prior to admission patient needed assistance:   Dependent ADLs:: Independent  Dependent IADLs:: Independent         Additional Information:  Edna met with pt and introduced self and role. Pt is independent with IADL/ADLs.  Pt originally was going to return home with assist from spouse.  However, pt has had some medical issues arise since admission.  Pt is now needing a TCU.     [employer] provided pt the SNF packet and a list of Medicare.gov SNFs 10 mi within his address.  Pt spoke with wife and made top choices of TCUs.  They prefer [facility] since spouse and friends work there, they believe this would be the best place due  to possible language barriers at other facilities. SW did enter this in the referral note and LVM for admissions re: this.  2) [facility] 3) [facility] and 4) [facility]. All referrals sent.    Christiana Husain, ESTEPHANIA, [facility] HospitalSW, [facility]  Inpatient Care Coordination  [facility]  [phone]          CHRISTIANA HUSAIN

## 2023-06-17 ENCOUNTER — APPOINTMENT (OUTPATIENT)
Dept: PHYSICAL THERAPY | Facility: CLINIC | Age: 43
End: 2023-06-17
Attending: ORTHOPAEDIC SURGERY
Payer: COMMERCIAL

## 2023-06-17 LAB
ERYTHROCYTE [DISTWIDTH] IN BLOOD BY AUTOMATED COUNT: 12.5 % (ref 10–15)
GLUCOSE BLDC GLUCOMTR-MCNC: 128 MG/DL (ref 70–99)
GLUCOSE BLDC GLUCOMTR-MCNC: 131 MG/DL (ref 70–99)
GLUCOSE BLDC GLUCOMTR-MCNC: 152 MG/DL (ref 70–99)
GLUCOSE BLDC GLUCOMTR-MCNC: 153 MG/DL (ref 70–99)
GLUCOSE BLDC GLUCOMTR-MCNC: 182 MG/DL (ref 70–99)
HCT VFR BLD AUTO: 27.8 % (ref 40–53)
HGB BLD-MCNC: 9.2 G/DL (ref 13.3–17.7)
LACTATE SERPL-SCNC: 1.3 MMOL/L (ref 0.7–2)
MAGNESIUM SERPL-MCNC: 1.8 MG/DL (ref 1.7–2.3)
MCH RBC QN AUTO: 28.9 PG (ref 26.5–33)
MCHC RBC AUTO-ENTMCNC: 33.1 G/DL (ref 31.5–36.5)
MCV RBC AUTO: 87 FL (ref 78–100)
PHOSPHATE SERPL-MCNC: 4.3 MG/DL (ref 2.5–4.5)
PLATELET # BLD AUTO: 260 10E3/UL (ref 150–450)
POTASSIUM SERPL-SCNC: 4.1 MMOL/L (ref 3.4–5.3)
RBC # BLD AUTO: 3.18 10E6/UL (ref 4.4–5.9)
VANCOMYCIN SERPL-MCNC: 9.5 UG/ML
WBC # BLD AUTO: 10.5 10E3/UL (ref 4–11)

## 2023-06-17 PROCEDURE — 83605 ASSAY OF LACTIC ACID: CPT | Performed by: STUDENT IN AN ORGANIZED HEALTH CARE EDUCATION/TRAINING PROGRAM

## 2023-06-17 PROCEDURE — 97116 GAIT TRAINING THERAPY: CPT | Mod: GP | Performed by: PHYSICAL THERAPIST

## 2023-06-17 PROCEDURE — 99221 1ST HOSP IP/OBS SF/LOW 40: CPT | Performed by: INTERNAL MEDICINE

## 2023-06-17 PROCEDURE — 120N000001 HC R&B MED SURG/OB

## 2023-06-17 PROCEDURE — 250N000013 HC RX MED GY IP 250 OP 250 PS 637: Performed by: PHYSICIAN ASSISTANT

## 2023-06-17 PROCEDURE — 84132 ASSAY OF SERUM POTASSIUM: CPT | Performed by: STUDENT IN AN ORGANIZED HEALTH CARE EDUCATION/TRAINING PROGRAM

## 2023-06-17 PROCEDURE — 99232 SBSQ HOSP IP/OBS MODERATE 35: CPT | Performed by: STUDENT IN AN ORGANIZED HEALTH CARE EDUCATION/TRAINING PROGRAM

## 2023-06-17 PROCEDURE — 80202 ASSAY OF VANCOMYCIN: CPT | Performed by: ORTHOPAEDIC SURGERY

## 2023-06-17 PROCEDURE — 258N000003 HC RX IP 258 OP 636: Performed by: STUDENT IN AN ORGANIZED HEALTH CARE EDUCATION/TRAINING PROGRAM

## 2023-06-17 PROCEDURE — 85014 HEMATOCRIT: CPT | Performed by: STUDENT IN AN ORGANIZED HEALTH CARE EDUCATION/TRAINING PROGRAM

## 2023-06-17 PROCEDURE — 250N000011 HC RX IP 250 OP 636: Performed by: STUDENT IN AN ORGANIZED HEALTH CARE EDUCATION/TRAINING PROGRAM

## 2023-06-17 PROCEDURE — 36415 COLL VENOUS BLD VENIPUNCTURE: CPT | Performed by: STUDENT IN AN ORGANIZED HEALTH CARE EDUCATION/TRAINING PROGRAM

## 2023-06-17 PROCEDURE — 83735 ASSAY OF MAGNESIUM: CPT | Performed by: STUDENT IN AN ORGANIZED HEALTH CARE EDUCATION/TRAINING PROGRAM

## 2023-06-17 PROCEDURE — 87040 BLOOD CULTURE FOR BACTERIA: CPT | Performed by: STUDENT IN AN ORGANIZED HEALTH CARE EDUCATION/TRAINING PROGRAM

## 2023-06-17 PROCEDURE — 250N000013 HC RX MED GY IP 250 OP 250 PS 637: Performed by: INTERNAL MEDICINE

## 2023-06-17 PROCEDURE — 84100 ASSAY OF PHOSPHORUS: CPT | Performed by: STUDENT IN AN ORGANIZED HEALTH CARE EDUCATION/TRAINING PROGRAM

## 2023-06-17 RX ORDER — MAGNESIUM SULFATE HEPTAHYDRATE 40 MG/ML
2 INJECTION, SOLUTION INTRAVENOUS ONCE
Status: COMPLETED | OUTPATIENT
Start: 2023-06-17 | End: 2023-06-17

## 2023-06-17 RX ADMIN — ACETAMINOPHEN 650 MG: 325 TABLET, FILM COATED ORAL at 10:59

## 2023-06-17 RX ADMIN — SODIUM CHLORIDE, POTASSIUM CHLORIDE, SODIUM LACTATE AND CALCIUM CHLORIDE: 600; 310; 30; 20 INJECTION, SOLUTION INTRAVENOUS at 03:10

## 2023-06-17 RX ADMIN — ASPIRIN 81 MG: 81 TABLET, COATED ORAL at 08:11

## 2023-06-17 RX ADMIN — INSULIN ASPART 1 UNITS: 100 INJECTION, SOLUTION INTRAVENOUS; SUBCUTANEOUS at 08:08

## 2023-06-17 RX ADMIN — INSULIN ASPART 1 UNITS: 100 INJECTION, SOLUTION INTRAVENOUS; SUBCUTANEOUS at 17:53

## 2023-06-17 RX ADMIN — ACETAMINOPHEN 650 MG: 325 TABLET, FILM COATED ORAL at 20:21

## 2023-06-17 RX ADMIN — HYDROXYZINE HYDROCHLORIDE 25 MG: 25 TABLET, FILM COATED ORAL at 20:21

## 2023-06-17 RX ADMIN — VANCOMYCIN HYDROCHLORIDE 1500 MG: 10 INJECTION, POWDER, LYOPHILIZED, FOR SOLUTION INTRAVENOUS at 09:19

## 2023-06-17 RX ADMIN — CEFEPIME 2 G: 2 INJECTION, POWDER, FOR SOLUTION INTRAVENOUS at 03:11

## 2023-06-17 RX ADMIN — ASPIRIN 81 MG: 81 TABLET, COATED ORAL at 20:21

## 2023-06-17 RX ADMIN — CLINDAMYCIN PHOSPHATE 900 MG: 900 INJECTION, SOLUTION INTRAVENOUS at 08:11

## 2023-06-17 RX ADMIN — B-COMPLEX W/ C & FOLIC ACID TAB 1 TABLET: TAB at 08:11

## 2023-06-17 RX ADMIN — MAGNESIUM SULFATE HEPTAHYDRATE 2 G: 2 INJECTION, SOLUTION INTRAVENOUS at 10:55

## 2023-06-17 RX ADMIN — ALLOPURINOL 400 MG: 300 TABLET ORAL at 08:11

## 2023-06-17 RX ADMIN — HYDROXYZINE HYDROCHLORIDE 25 MG: 25 TABLET, FILM COATED ORAL at 10:59

## 2023-06-17 RX ADMIN — CEFEPIME 2 G: 2 INJECTION, POWDER, FOR SOLUTION INTRAVENOUS at 11:36

## 2023-06-17 ASSESSMENT — ACTIVITIES OF DAILY LIVING (ADL)
ADLS_ACUITY_SCORE: 22
ADLS_ACUITY_SCORE: 22
ADLS_ACUITY_SCORE: 26
ADLS_ACUITY_SCORE: 25
ADLS_ACUITY_SCORE: 22
ADLS_ACUITY_SCORE: 21
ADLS_ACUITY_SCORE: 26
ADLS_ACUITY_SCORE: 21
ADLS_ACUITY_SCORE: 22
ADLS_ACUITY_SCORE: 25
ADLS_ACUITY_SCORE: 26
ADLS_ACUITY_SCORE: 25

## 2023-06-17 NOTE — PROGRESS NOTES
[facility]  Medicine Progress Note - Hospitalist Service  Date of Admission:  6/13/2023    Assessment & Plan   Mr. Cora Senior is a 43 year old man native to [facility] who was admitted for Right IVONNE. Medicine service was consulted to assist with comanagement of chronic medical problems in addition to sinus tachycardia, postsurgical blood loss anemia and now fever with concern for sepsis with unclear source. ID consulted with no concern for active infection, antibiotics discontinued, anemia work up and tachycardia work up completed as below. Medicine will follow peripherally at this time.      Assessment and plan:   s/p right IVONNE 6/14   Medical history is very complicated by social history.  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 Hx of multiple hip surgeries following a hip injury at age 13.  His prior surgeries were complex and incompletely beneficial.  The patient really has been unable to bear weight on his left lower extremity for years and he indicates that he really has no muscle function on left lower extremity.  -Orthopedics management as primary team    -Gram stain of tissue with no organisms seen but 4+ white blood cells and 2+ PMNs, otherwise cultures negative at this time  -CT scan 06/16/23 with prominent edema and fluid at the operative site along with bubbles of gas. Correlate with any infectious etiology at this location, but this could just be postoperative evolving blood products and fluid   -Orthopedics aware and not planning on seeing the patient today, not concerned with above findings   -Primary team cares per orthopedics     Postoperative fever, recurrent    Concern for evolving sepsis of unclear source    Leukocytosis, hypotension, lactic acidosis, resolved    Tachycardia, persistent   Suspected fever 6/14 evening postoperatively, UA was without evidence of infection, chest x-ray without any  obvious infiltrate, ultrasound of the lower extremities with no DVTs, However procal was elevated at 0.30. CT abdomen and pelvis with no obvious findings as cause of fever. CT PE negative for PE or other process. Again, no evidence of pneumonia, hypoxia or respiratory infection. Blood cultures with no growth.  -ID consult given ongoing recurrent fevers    -discontinued all abx per ID today     Sinus tachycardia following surgery, ongoing   Suspect due to decreased intravascular volume although now considering sepsis of unclear source. IV fluid bolus 06/15/23 , transfusion 6/15, minimal response. EKG with sinus tachycardia, non specific t wave change. Troponin within normal limits, ACS very unlikely. TTE with EF at 60% and no valvular disease. CT PE negative 06/16/23. CT chest abdomen pelvis 06/16/23 with no evidence of hematoma or other blood loss to explain tachycardia  -Continue cardiac monitoring with ongoing tachycardia   -not responding to fluids, discontinue IVF as tolerating PO    -other considerations include medication side effect, withdrawal, malnutrition with hypoalbuminemia?  Although regardless does not explain fevers and hypotension     Acute on chronic postsurgical blood loss anemia, persistent   Concern for alternative source of ongoing bleeding   He indicates that he has had multiple transfusions following prior surgeries though its not clear that he had massive bleeding problems.  I note that in the past his hemoglobin values have been on the high side (running between 15 and 17). Pre-op Hgb 15.9 (on 5/19/23).  Note that iron studies are equivocal, folate is low, vitamin B12 is relatively low. Consider possible underlying malnutrition.  Empirically started B complex vitamin with vitamin C. HGB 06/15/23 9.1, transfusion 06/15/23 with concern for acute blood loss anemia as he remained tachycardiac and symptomatic with fatigue, symptom limited PT, HGB 8.6 the day following transfusion. Now has been  stable. Peripheral smear with Moderate anemia, normochromic normocytic, and slight leukocytosis with neutrophilia. CT chest abdomen pelvis 6/16 without evidence of intra-abdominal bleed or hematoma, no evidence of infection either.   -HGB stable, CBC QAM     Non insulin dependent diabetes mellitus   -holding PTA Metformin  -Insulin sliding scale, BG well controlled     Moderately decreased functional cardiac capacity for age, resolved   TTE performed on 3/20/23 with normal stress echocardiogram without evidence of stress-induced ischemia, EF at 55 to 60%, stress testing without any anginal chest pain, moderately reduced functional capacity for age noted. Repeat TTE obtained on 6/16 with EF at 60% without any valvular abnormalities.     Hypo-magnesium-replace as needed    Yehc-ahonuddffjm-wwsuj, work on nutrition    Chronic and stable:   Chronic Gout - PTA allopurinol  HTN   SULTANA. -On BiPAP  Hypomagnesemia-replace as needed  Hypoalbuminemia-noted on admission, nutrition evaluation    History of tobacco abuse without previously documented lung disease.  Hx tobacco abuse. Quit in 2022   Obesity-BMI 30.7    Incidental findings:   Pulmonary nodules-incidentally noted  Small nonobstructing stone of the left kidney-incidentally noted         Diet: Discharge Instruction - Regular Diet Adult  High Kcal/High Protein Diet, ADULT    DVT Prophylaxis: Pneumatic Compression Devices  Malhotra Catheter: Not present  Lines: None     Cardiac Monitoring: ACTIVE order. Indication: Tachyarrhythmias, acute (48 hours)  Code Status: Full Code      Clinically Significant Risk Factors            # Hypomagnesemia: Lowest Mg = 1.6 mg/dL in last 2 days, will replace as needed   # Hypoalbuminemia: Lowest albumin = 2.4 g/dL at 6/16/2023  6:40 AM, will monitor as appropriate  # Coagulation Defect: INR = 1.20 (Ref range: 0.85 - 1.15) and/or PTT = N/A, will monitor for bleeding          # DMII: A1C = 7.2 % (Ref range: 0.0 - 5.6 %) within past 6 months,  "PRESENT ON ADMISSION  # Obesity: Estimated body mass index is 30.7 kg/m  as calculated from the following:    Height as of this encounter: 1.75 m (5' 8.9\").    Weight as of this encounter: 94 kg (207 lb 4.8 oz)., PRESENT ON ADMISSION          Disposition Plan      Expected Discharge Date: 06/19/2023                  Sierra Lawrence DO  Hospitalist Service  [facility]  Securely message with [employer] (more info)  Text page via [employer] Paging/Directory   ______________________________________________________________________    Interval History   Had a fever of 100.8 yesterday evening , remains tachycardic     Is feeling much improved today and has not had any other new symptoms   Pain seems to be improved to right hip (area of operation)     Does not report any chest pain, pressure or shortness of breath.  He reports no other concerns or complaints today.  Significant other and child is at bedside during interview today     Physical Exam   Vital Signs: Temp: 98  F (36.7  C) Temp src: Temporal BP: 115/69 Pulse: (!) 125   Resp: 16 SpO2: 96 % O2 Device: None (Room air)    Weight: 207 lbs 4.8 oz    Constitutional: awake, alert, cooperative, no apparent distress, and appears stated age   HEENT: Normocephalic, atraumatic  Respiratory: Normal work of breathing   Cardiovascular: tachycardic, normal chest rise   GI: Soft and nontender to palpation, nondistended, no rebound or guarding  Skin: normal skin color, texture, turgor  Operative hip site is clean and dry   Musculoskeletal: No deformities, no edema present  Neurologic: Alert and oriented, no focal deficits   Neuropsychiatric: General: normal, calm and normal eye contact      Data   ------------------------- PAST 24 HR DATA REVIEWED -----------------------------------------------    I have personally reviewed the following data over the past 24 hrs:    10.5  \   9.2 (L)   / 260     N/A N/A N/A /  128 (H)   4.1 N/A N/A \       Procal: N/A CRP: N/A " Lactic Acid: 1.3         Imaging results reviewed over the past 24 hrs:   No results found for this or any previous visit (from the past 24 hour(s)).

## 2023-06-17 NOTE — PLAN OF CARE
15-year-old female with history of anxiety, gastroesophageal reflux disease, and hypercholesteremia. Patient resents today with primary complaint of 10 days of persistent fever with associated productive cough with rust colored sputum, chills, diarrhea, and myalgias. Patient states she has been vaccinated for COVID-19 reports no specific exposures. Patient denies any associated nausea/vomiting, abdominal pain, syncope, or chest pain. Past Medical History:   Diagnosis Date    Anxiety 2010    GERD (gastroesophageal reflux disease)     Grief reaction 2010    Headache disorder 2010    Headache(784.0)     Hypercholesterolemia     Pure hypercholesterolemia 2010    Walking pneumonia        Past Surgical History:   Procedure Laterality Date    HX COLONOSCOPY           Family History:   Problem Relation Age of Onset    Hypertension Father     Elevated Lipids Father     Heart Disease Father        Social History     Socioeconomic History    Marital status:      Spouse name: Not on file    Number of children: Not on file    Years of education: Not on file    Highest education level: Not on file   Occupational History    Not on file   Tobacco Use    Smoking status: Former Smoker     Packs/day: 0.25     Years: 30.00     Pack years: 7.50     Quit date: 2020     Years since quittin.3    Smokeless tobacco: Never Used   Substance and Sexual Activity    Alcohol use: No    Drug use: No    Sexual activity: Not Currently     Partners: Male   Other Topics Concern    Not on file   Social History Narrative    Not on file     Social Determinants of Health     Financial Resource Strain:     Difficulty of Paying Living Expenses: Not on file   Food Insecurity:     Worried About Running Out of Food in the Last Year: Not on file    Luz Elena of Food in the Last Year: Not on file   Transportation Needs:     Lack of Transportation (Medical):  Not on file    Lack of Goal Outcome Evaluation:      Plan of Care Reviewed With: patient    Overall Patient Progress: improvingOverall Patient Progress: improving     Patient vital signs are at baseline: No,  Reason:  Elevated temp and HR.   Patient able to ambulate as they were prior to admission or with assist devices provided by therapies during their stay:  No,  Reason:  Assist x2 with walker and gait belt.  Patient MUST void prior to discharge:  Yes  Patient able to tolerate oral intake:  Yes  Pain has adequate pain control using Oral analgesics:  Yes  Does patient have an identified :  Yes  Has goal D/C date and time been discussed with patient:  No,  Reason:      Pt A/O x4. VSS except elevated temp and HR.Pain managed with PRN Tylenol and Oxycodone. Assist x2 with gait belt and walker. Voiding via urinal. Tolerating Diet well. CMS at baseline. Dressing CDI. Plan TCU at discharge, referrals sent.            Transportation (Non-Medical): Not on file   Physical Activity:     Days of Exercise per Week: Not on file    Minutes of Exercise per Session: Not on file   Stress:     Feeling of Stress : Not on file   Social Connections:     Frequency of Communication with Friends and Family: Not on file    Frequency of Social Gatherings with Friends and Family: Not on file    Attends Sikhism Services: Not on file    Active Member of [address] [place] [address] or Organizations: Not on file    Attends Club or Organization Meetings: Not on file    Marital Status: Not on file   Intimate Partner Violence:     Fear of Current or Ex-Partner: Not on file    Emotionally Abused: Not on file    Physically Abused: Not on file    Sexually Abused: Not on file   Housing Stability:     Unable to Pay for Housing in the Last Year: Not on file    Number of [place] in the Last Year: Not on file    Unstable Housing in the Last Year: Not on file         ALLERGIES: Aspirin, Darvocet a500 [propoxyphene n-acetaminophen], Midrin [lfvneza-pulqgnjrh-uyvqvveoaptd], Neurontin [gabapentin], Ultram [tramadol], and Codeine    Review of Systems   Constitutional: Positive for appetite change, chills and fever. HENT: Negative for rhinorrhea and sore throat. Eyes: Negative for discharge and redness. Respiratory: Positive for cough and shortness of breath. Cardiovascular: Negative for chest pain and leg swelling. Gastrointestinal: Positive for diarrhea. Negative for abdominal pain, nausea and vomiting. Genitourinary: Negative for difficulty urinating and dysuria. Musculoskeletal: Negative for back pain and neck pain. Skin: Negative for rash and wound. Neurological: Negative for syncope, light-headedness and headaches.        Vitals:    01/06/22 0745 01/06/22 0749 01/06/22 0749   BP:  124/64    Pulse: 86     Resp: 19     Temp: (!) 101.1 °F (38.4 °C)     SpO2: 100%  100%   Weight: 54.4 kg (120 lb)     Height: 5' 5\" (1.651 m)              Physical Exam  Constitutional:       General: She is not in acute distress. Appearance: She is not ill-appearing, toxic-appearing or diaphoretic. HENT:      Head: Normocephalic and atraumatic. Right Ear: External ear normal.      Left Ear: External ear normal.      Nose: No congestion or rhinorrhea. Mouth/Throat:      Mouth: Mucous membranes are moist.      Pharynx: No oropharyngeal exudate or posterior oropharyngeal erythema. Eyes:      General:         Right eye: No discharge. Left eye: No discharge. Pupils: Pupils are equal, round, and reactive to light. Neck:      Vascular: No carotid bruit. Cardiovascular:      Rate and Rhythm: Normal rate and regular rhythm. Heart sounds: No murmur heard. No friction rub. No gallop. Pulmonary:      Effort: Pulmonary effort is normal. No respiratory distress. Breath sounds: No stridor. No wheezing, rhonchi or rales. Comments: Lungs are clear to auscultation bilaterally nonfocal with no wheezing, rhonchi, or rales. Chest:      Chest wall: No tenderness. Abdominal:      General: Abdomen is flat. There is no distension. Tenderness: There is no right CVA tenderness, left CVA tenderness, guarding or rebound. Musculoskeletal:         General: No swelling, tenderness, deformity or signs of injury. Cervical back: No rigidity or tenderness. Right lower leg: No edema. Left lower leg: No edema. Lymphadenopathy:      Cervical: No cervical adenopathy. Skin:     General: Skin is warm. Capillary Refill: Capillary refill takes less than 2 seconds. Coloration: Skin is not jaundiced or pale. Findings: No bruising, erythema, lesion or rash. Neurological:      General: No focal deficit present. Mental Status: She is alert and oriented to person, place, and time. Sensory: No sensory deficit. Motor: No weakness.    Psychiatric:         Mood and Affect: Mood normal.          MDM  Number of Diagnoses or Management Options  Diagnosis management comments: She presents with a clinical syndrome consistent with a bacterial community-acquired pneumonia especially given persistent fever. Will evaluate with chest x-ray, laboratory studies, will provide patient with dose of antibiotics while in the emergency department and if work-up is reassuring will plan for discharge home with outpatient therapy. Amount and/or Complexity of Data Reviewed  Tests in the radiology section of CPT®: reviewed      ED Course as of 01/06/22 0843   Thu Jan 06, 2022   0841 XR CHEST PA LAT  No evidence of acute infiltrate, bony abnormality, heart size within normal limits, or pneumothorax.  [JK]      ED Course User Index  [JK] Tanesha Cohen MD       Procedures

## 2023-06-17 NOTE — PLAN OF CARE
Goal Outcome Evaluation:      Plan of Care Reviewed With: patient, spouse    Overall Patient Progress: improvingOverall Patient Progress: improving     Patient vital signs are at baseline: No,  Reason:  tachy  Patient able to ambulate as they were prior to admission or with assist devices provided by therapies during their stay:  Yes  Patient MUST void prior to discharge:  Yes  Patient able to tolerate oral intake:  Yes  Pain has adequate pain control using Oral analgesics:  Yes  Does patient have an identified :  Yes  Has goal D/C date and time been discussed with patient:  No,  Reason:  plans TBD    Pt A&Ox4, tachy in 120s and up to 140s when ambulating. MD aware. Tele in place. Afebrile. Assist x1 with walker and GB. Voiding bedside urinal and ambulating to the bathroom. 2 Bms this shift. CMS intact, dressing CDI, ice applied. Mg replaced this shift. Two PIVs both SL. All abx discontinued. Tylenol and atarax for pain. Sugar checks with sliding scale. Blood cultures pending. Spouse and child at bedside today.

## 2023-06-17 NOTE — PLAN OF CARE
"Goal Outcome Evaluation:  Vital signs, low grade fever, tachycardic 's, remote telemetry monitoring.  Deniers chest pain or dyspnea, sats on roomair 94 %  Pt transferred to chair with assist of 2 using walker and gait belt. Pt unable to lift left foot off ground, stated\"has always had weakness and no muscle strength in left leg\".  Encouraged to increase po fluid intake, sit up in chair, use inspirometer and do ankle pump exercises.  Voids per urinal.  On iv Cleocin,maxipime, and vanco.  Blood glucose monitored.  Cms intact, dressing dry.  Lactic acid level recheck 1.1.  Swelling , mild redness to left thigh/hip. Ice pack applied.  Pain controlled with tylenol, vistaril and po dilaudid.    Plan of Care Reviewed With: patient                 "

## 2023-06-17 NOTE — PROGRESS NOTES
Care Management Follow Up    Length of Stay (days): 2    Expected Discharge Date: 06/19/2023     Concerns to be Addressed: needs TCU       Patient plan of care discussed at interdisciplinary rounds: Yes    Anticipated Discharge Disposition: Transitional Care     Additional Information:  Chart reviewed.  Pt denied at following:  [facility]  [facility]    SW tried to f/u with referrals that were sent to:  [facility]  [facility]-no answer    SW met with pt to get additional TCU preferences.  SW made two more referrals.     Plan:  SW waiting to hear back from TCU's on availability        Jeannie BEST, [facility]  Inpatient Care Coordination   [facility]   [phone]

## 2023-06-17 NOTE — PROVIDER NOTIFICATION
Laurent to Dr. Lawrence - pt is tachy 120s-140s. No chest pain, tightness or SOB. Other vitals WNL.
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
## 2023-06-18 ENCOUNTER — APPOINTMENT (OUTPATIENT)
Dept: PHYSICAL THERAPY | Facility: CLINIC | Age: 43
End: 2023-06-18
Attending: ORTHOPAEDIC SURGERY
Payer: COMMERCIAL

## 2023-06-18 LAB
BACTERIA TISS BX CULT: NO GROWTH
ERYTHROCYTE [DISTWIDTH] IN BLOOD BY AUTOMATED COUNT: 12.6 % (ref 10–15)
GLUCOSE BLDC GLUCOMTR-MCNC: 139 MG/DL (ref 70–99)
GLUCOSE BLDC GLUCOMTR-MCNC: 143 MG/DL (ref 70–99)
GLUCOSE BLDC GLUCOMTR-MCNC: 148 MG/DL (ref 70–99)
GLUCOSE BLDC GLUCOMTR-MCNC: 163 MG/DL (ref 70–99)
GLUCOSE BLDC GLUCOMTR-MCNC: 177 MG/DL (ref 70–99)
HCT VFR BLD AUTO: 28.4 % (ref 40–53)
HGB BLD-MCNC: 9.2 G/DL (ref 13.3–17.7)
MAGNESIUM SERPL-MCNC: 2 MG/DL (ref 1.7–2.3)
MCH RBC QN AUTO: 28.2 PG (ref 26.5–33)
MCHC RBC AUTO-ENTMCNC: 32.4 G/DL (ref 31.5–36.5)
MCV RBC AUTO: 87 FL (ref 78–100)
PHOSPHATE SERPL-MCNC: 4.9 MG/DL (ref 2.5–4.5)
PLATELET # BLD AUTO: 286 10E3/UL (ref 150–450)
POTASSIUM SERPL-SCNC: 4.1 MMOL/L (ref 3.4–5.3)
RBC # BLD AUTO: 3.26 10E6/UL (ref 4.4–5.9)
WBC # BLD AUTO: 8.5 10E3/UL (ref 4–11)

## 2023-06-18 PROCEDURE — 250N000013 HC RX MED GY IP 250 OP 250 PS 637: Performed by: INTERNAL MEDICINE

## 2023-06-18 PROCEDURE — 84100 ASSAY OF PHOSPHORUS: CPT | Performed by: STUDENT IN AN ORGANIZED HEALTH CARE EDUCATION/TRAINING PROGRAM

## 2023-06-18 PROCEDURE — 83735 ASSAY OF MAGNESIUM: CPT | Performed by: STUDENT IN AN ORGANIZED HEALTH CARE EDUCATION/TRAINING PROGRAM

## 2023-06-18 PROCEDURE — 250N000013 HC RX MED GY IP 250 OP 250 PS 637: Performed by: PHYSICIAN ASSISTANT

## 2023-06-18 PROCEDURE — 97116 GAIT TRAINING THERAPY: CPT | Mod: GP | Performed by: PHYSICAL THERAPIST

## 2023-06-18 PROCEDURE — 36415 COLL VENOUS BLD VENIPUNCTURE: CPT | Performed by: STUDENT IN AN ORGANIZED HEALTH CARE EDUCATION/TRAINING PROGRAM

## 2023-06-18 PROCEDURE — 250N000013 HC RX MED GY IP 250 OP 250 PS 637: Performed by: STUDENT IN AN ORGANIZED HEALTH CARE EDUCATION/TRAINING PROGRAM

## 2023-06-18 PROCEDURE — 99232 SBSQ HOSP IP/OBS MODERATE 35: CPT | Performed by: STUDENT IN AN ORGANIZED HEALTH CARE EDUCATION/TRAINING PROGRAM

## 2023-06-18 PROCEDURE — 85027 COMPLETE CBC AUTOMATED: CPT | Performed by: STUDENT IN AN ORGANIZED HEALTH CARE EDUCATION/TRAINING PROGRAM

## 2023-06-18 PROCEDURE — 120N000001 HC R&B MED SURG/OB

## 2023-06-18 PROCEDURE — 84132 ASSAY OF SERUM POTASSIUM: CPT | Performed by: STUDENT IN AN ORGANIZED HEALTH CARE EDUCATION/TRAINING PROGRAM

## 2023-06-18 PROCEDURE — 99232 SBSQ HOSP IP/OBS MODERATE 35: CPT | Performed by: INTERNAL MEDICINE

## 2023-06-18 RX ORDER — MULTIVITAMIN,THERAPEUTIC
1 TABLET ORAL DAILY
Status: DISCONTINUED | OUTPATIENT
Start: 2023-06-18 | End: 2023-06-19 | Stop reason: HOSPADM

## 2023-06-18 RX ORDER — MULTIVITAMIN,THERAPEUTIC
1 TABLET ORAL DAILY
Qty: 90 TABLET | Refills: 3 | Status: SHIPPED | OUTPATIENT
Start: 2023-06-18 | End: 2024-03-05

## 2023-06-18 RX ORDER — FERROUS SULFATE 325(65) MG
325 TABLET ORAL EVERY OTHER DAY
Qty: 30 TABLET | Refills: 1 | Status: SHIPPED | OUTPATIENT
Start: 2023-06-20 | End: 2024-03-05

## 2023-06-18 RX ORDER — MAGNESIUM OXIDE 400 MG/1
400 TABLET ORAL EVERY 4 HOURS
Status: COMPLETED | OUTPATIENT
Start: 2023-06-18 | End: 2023-06-18

## 2023-06-18 RX ORDER — FERROUS SULFATE 325(65) MG
325 TABLET ORAL EVERY OTHER DAY
Status: DISCONTINUED | OUTPATIENT
Start: 2023-06-18 | End: 2023-06-19 | Stop reason: HOSPADM

## 2023-06-18 RX ADMIN — INSULIN ASPART 1 UNITS: 100 INJECTION, SOLUTION INTRAVENOUS; SUBCUTANEOUS at 07:59

## 2023-06-18 RX ADMIN — ASPIRIN 81 MG: 81 TABLET, COATED ORAL at 21:05

## 2023-06-18 RX ADMIN — HYDROXYZINE HYDROCHLORIDE 25 MG: 25 TABLET, FILM COATED ORAL at 17:30

## 2023-06-18 RX ADMIN — ACETAMINOPHEN 650 MG: 325 TABLET, FILM COATED ORAL at 21:06

## 2023-06-18 RX ADMIN — ALLOPURINOL 400 MG: 300 TABLET ORAL at 08:00

## 2023-06-18 RX ADMIN — ATORVASTATIN CALCIUM 20 MG: 20 TABLET, FILM COATED ORAL at 08:00

## 2023-06-18 RX ADMIN — INSULIN ASPART 1 UNITS: 100 INJECTION, SOLUTION INTRAVENOUS; SUBCUTANEOUS at 12:10

## 2023-06-18 RX ADMIN — MAGNESIUM OXIDE TAB 400 MG (241.3 MG ELEMENTAL MG) 400 MG: 400 (241.3 MG) TAB at 08:00

## 2023-06-18 RX ADMIN — MAGNESIUM OXIDE TAB 400 MG (241.3 MG ELEMENTAL MG) 400 MG: 400 (241.3 MG) TAB at 12:10

## 2023-06-18 RX ADMIN — ASPIRIN 81 MG: 81 TABLET, COATED ORAL at 08:00

## 2023-06-18 RX ADMIN — SENNOSIDES AND DOCUSATE SODIUM 1 TABLET: 50; 8.6 TABLET ORAL at 21:05

## 2023-06-18 RX ADMIN — ACETAMINOPHEN 650 MG: 325 TABLET, FILM COATED ORAL at 17:30

## 2023-06-18 RX ADMIN — HYDROXYZINE HYDROCHLORIDE 25 MG: 25 TABLET, FILM COATED ORAL at 08:00

## 2023-06-18 RX ADMIN — ACETAMINOPHEN 650 MG: 325 TABLET, FILM COATED ORAL at 08:00

## 2023-06-18 RX ADMIN — FERROUS SULFATE TAB 325 MG (65 MG ELEMENTAL FE) 325 MG: 325 (65 FE) TAB at 12:16

## 2023-06-18 RX ADMIN — THERA TABS 1 TABLET: TAB at 13:48

## 2023-06-18 RX ADMIN — B-COMPLEX W/ C & FOLIC ACID TAB 1 TABLET: TAB at 08:00

## 2023-06-18 ASSESSMENT — ACTIVITIES OF DAILY LIVING (ADL)
ADLS_ACUITY_SCORE: 21

## 2023-06-18 NOTE — PLAN OF CARE
Goal Outcome Evaluation:      Plan of Care Reviewed With: patient    Overall Patient Progress: improvingOverall Patient Progress: improving    Patient vital signs are at baseline: Yes  Patient able to ambulate as they were prior to admission or with assist devices provided by therapies during their stay:  Yes  Patient MUST void prior to discharge:  Yes  Patient able to tolerate oral intake:  Yes  Pain has adequate pain control using Oral analgesics:  Yes  Does patient have an identified :  Yes  Has goal D/C date and time been discussed with patient:  Yes     Pt A/O x4. VSS; tachycardic. Remote tele in place. Pain managed with tylenol and atarax. Assist x1 with walker and gait belt. Voiding well. Tolerating diet well. CMS at baseline. Dressing CDI. Plan is TCU at discharge, pending placement.
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
## 2023-06-18 NOTE — PROGRESS NOTES
"[facility]  Infectious Disease Progress Note          Assessment and Plan:   Assessment & Plan  Thet Vinod Senior is a 43 year old male who was admitted on 6/13/2023.      Impression: 1 43-year-old male left total hip arthroplasty, generally usual postoperative course  2 fever, this is common, normal and routine in postop total joint course, work-up negative, nothing to suggest localizing infection     REC 1 unlikely any concern here, definitely no indication for antibiotics discontinue all at this point, hold on bigger work-up watch fever, do occur for several days postop, continue incentive spirometry and watch for any other localizing symptoms  Down, nothing to suggest significant issue here, okay disposition anytime, will sign off call if issues           Interval History:   no new complaints and doing well; no cp, sob, n/v/d, or abd pain.              Medications:       allopurinol  400 mg Oral Daily     aspirin  81 mg Oral BID     atorvastatin  20 mg Oral Daily     ferrous sulfate  325 mg Oral Every Other Day     insulin aspart  1-7 Units Subcutaneous TID AC     insulin aspart  1-5 Units Subcutaneous At Bedtime     [Held by provider] lisinopril  2.5 mg Oral Daily     [Held by provider] metFORMIN  2,000 mg Oral Daily with supper     polyethylene glycol  17 g Oral Daily     senna-docusate  1 tablet Oral BID     sodium chloride (PF)  3 mL Intracatheter Q8H     vitamin B complex with vitamin C  1 tablet Oral Daily                  Physical Exam:   Blood pressure 114/74, pulse 104, temperature 98.3  F (36.8  C), temperature source Temporal, resp. rate 24, height 1.75 m (5' 8.9\"), weight 94 kg (207 lb 4.8 oz), SpO2 99 %.  Wt Readings from Last 2 Encounters:   06/13/23 94 kg (207 lb 4.8 oz)   05/19/23 96.2 kg (212 lb)     Vital Signs with Ranges  Temp:  [98.3  F (36.8  C)-99.8  F (37.7  C)] 98.3  F (36.8  C)  Pulse:  [104-118] 104  Resp:  [14-24] 24  BP: (114-116)/(65-75) 114/74  SpO2:  [97 %-99 " %] 99 %    Constitutional: Awake, alert, cooperative, no apparent distress   Lungs: Clear to auscultation bilaterally, no crackles or wheezing   Cardiovascular: Regular rate and rhythm, normal S1 and S2, and no murmur noted   Abdomen: Normal bowel sounds, soft, non-distended, non-tender   Skin: No rashes, no cyanosis, no edema   Other:           Data:   All microbiology laboratory data reviewed.  Recent Labs   Lab Test 06/18/23  0624 06/17/23  0722 06/16/23  1345 06/16/23  0903   WBC 8.5 10.5  --  12.6*   HGB 9.2* 9.2* 8.3* 9.3*   HCT 28.4* 27.8*  --  27.8*   MCV 87 87  --  87    260  --  184     Recent Labs   Lab Test 06/16/23  0640 06/15/23  0616 06/14/23  1823   CR 0.73 0.80 0.87     Recent Labs   Lab Test 12/23/22  1523   SED 18*     No lab results found.    Invalid input(s): LYDIA

## 2023-06-18 NOTE — PLAN OF CARE
Goal Outcome Evaluation:      Plan of Care Reviewed With: patient    Overall Patient Progress: improvingOverall Patient Progress: improving     Pt A/O x4. VSS; tachycardic. Remote tele in place. Pain well managed with tylenol and atarax. SBA with crutches. Ambulated halls and did stairs with PT. Voiding well. Tolerating diet well. CMS at baseline. Dressing CDI. Ice applied. Plan is to discharge home.

## 2023-06-18 NOTE — PLAN OF CARE
Goal Outcome Evaluation:  Vital signs stable, tachycardic on remote telemetry. Denies dyspnea or chest pain.  Lungs clear, encouraged inspirometer use.  Ambulated with walker, gait belt and assist of 1.  CMS intact, moderate strength in left leg and foot, baseline muscle weakness in left leg, unable to lift left leg off of bed without help from right leg.  PCD;s on, encouraged ankle pump exercises.  Hgb 9.2.  Blood glucose monitored.  Pain controlled with tylenol and atarax,    Plan of Care Reviewed With: patient

## 2023-06-19 ENCOUNTER — APPOINTMENT (OUTPATIENT)
Dept: OCCUPATIONAL THERAPY | Facility: CLINIC | Age: 43
End: 2023-06-19
Attending: ORTHOPAEDIC SURGERY
Payer: COMMERCIAL

## 2023-06-19 VITALS
OXYGEN SATURATION: 94 % | TEMPERATURE: 98.7 F | WEIGHT: 207.3 LBS | SYSTOLIC BLOOD PRESSURE: 108 MMHG | HEIGHT: 69 IN | HEART RATE: 106 BPM | BODY MASS INDEX: 30.7 KG/M2 | DIASTOLIC BLOOD PRESSURE: 76 MMHG | RESPIRATION RATE: 16 BRPM

## 2023-06-19 LAB
ATRIAL RATE - MUSE: 135 BPM
DIASTOLIC BLOOD PRESSURE - MUSE: NORMAL MMHG
GLUCOSE BLDC GLUCOMTR-MCNC: 132 MG/DL (ref 70–99)
GLUCOSE BLDC GLUCOMTR-MCNC: 142 MG/DL (ref 70–99)
INTERPRETATION ECG - MUSE: NORMAL
MAGNESIUM SERPL-MCNC: 2 MG/DL (ref 1.7–2.3)
P AXIS - MUSE: 47 DEGREES
PHOSPHATE SERPL-MCNC: 4 MG/DL (ref 2.5–4.5)
POTASSIUM SERPL-SCNC: 4.4 MMOL/L (ref 3.4–5.3)
PR INTERVAL - MUSE: 142 MS
QRS DURATION - MUSE: 68 MS
QT - MUSE: 278 MS
QTC - MUSE: 417 MS
R AXIS - MUSE: 41 DEGREES
SYSTOLIC BLOOD PRESSURE - MUSE: NORMAL MMHG
T AXIS - MUSE: 37 DEGREES
VENTRICULAR RATE- MUSE: 135 BPM

## 2023-06-19 PROCEDURE — 36415 COLL VENOUS BLD VENIPUNCTURE: CPT | Performed by: STUDENT IN AN ORGANIZED HEALTH CARE EDUCATION/TRAINING PROGRAM

## 2023-06-19 PROCEDURE — 84132 ASSAY OF SERUM POTASSIUM: CPT | Performed by: STUDENT IN AN ORGANIZED HEALTH CARE EDUCATION/TRAINING PROGRAM

## 2023-06-19 PROCEDURE — 97535 SELF CARE MNGMENT TRAINING: CPT | Mod: GO

## 2023-06-19 PROCEDURE — 83735 ASSAY OF MAGNESIUM: CPT | Performed by: STUDENT IN AN ORGANIZED HEALTH CARE EDUCATION/TRAINING PROGRAM

## 2023-06-19 PROCEDURE — 84100 ASSAY OF PHOSPHORUS: CPT | Performed by: STUDENT IN AN ORGANIZED HEALTH CARE EDUCATION/TRAINING PROGRAM

## 2023-06-19 PROCEDURE — 250N000013 HC RX MED GY IP 250 OP 250 PS 637: Performed by: PHYSICIAN ASSISTANT

## 2023-06-19 PROCEDURE — 250N000013 HC RX MED GY IP 250 OP 250 PS 637: Performed by: STUDENT IN AN ORGANIZED HEALTH CARE EDUCATION/TRAINING PROGRAM

## 2023-06-19 PROCEDURE — 250N000013 HC RX MED GY IP 250 OP 250 PS 637: Performed by: INTERNAL MEDICINE

## 2023-06-19 RX ADMIN — ASPIRIN 81 MG: 81 TABLET, COATED ORAL at 08:10

## 2023-06-19 RX ADMIN — ALLOPURINOL 400 MG: 300 TABLET ORAL at 08:10

## 2023-06-19 RX ADMIN — THERA TABS 1 TABLET: TAB at 08:13

## 2023-06-19 RX ADMIN — B-COMPLEX W/ C & FOLIC ACID TAB 1 TABLET: TAB at 08:10

## 2023-06-19 RX ADMIN — INSULIN ASPART 1 UNITS: 100 INJECTION, SOLUTION INTRAVENOUS; SUBCUTANEOUS at 08:08

## 2023-06-19 RX ADMIN — ATORVASTATIN CALCIUM 20 MG: 20 TABLET, FILM COATED ORAL at 08:10

## 2023-06-19 ASSESSMENT — ACTIVITIES OF DAILY LIVING (ADL)
ADLS_ACUITY_SCORE: 21

## 2023-06-19 NOTE — PLAN OF CARE
Goal Outcome Evaluation:      Patient vital signs are at baseline: Yes except Tachycarida  Patient able to ambulate as they were prior to admission or with assist devices provided by therapies during their stay:  Yes Using crutches  Patient MUST void prior to discharge:  Yes  Patient able to tolerate oral intake:  Yes  Pain has adequate pain control using Oral analgesics:  Yes  Does patient have an identified :  Yes  Has goal D/C date and time been discussed with patient:  Yes

## 2023-06-19 NOTE — DISCHARGE SUMMARY
[facility]  Discharge Summary            Interval History:     43 year old male admitted postoperatively for elective Left total hip arthroplasty  with Dr. Erasmo Orozco due to hip condition unresponsive to non operative treatment.  There were no notable intraoperative complications.  Patient being discharged post op Day #5.  Cora Senior received skilled nursing, PT, OT, SW inquiry.  There was Hospitalist care during the stay, and consults by Infectious disease, and Nutrition.  Pt came into the hospital with a history of chronic gout, SULTANA, HTN, mineral deficiencies, obesity, DM II, cardiomegaly, PTSD.  Pt experience an extended post operative stay due to the following medical issues:    Symptomatic acute blood loss anemia with hypotension, tachycardia, dizziness, diaphoresis, which was worked up with laboratory testing, CT scans or chest and abdomen, US for DVT, EKG/cardiac monitoring without signs of ongoing surgical hemorrhage, or overt new hemorrhage or lysis, which was treated with fluid resuscitation, blood transfusion.  Fever over 101 deg F, positive lactic acid and leukocytosis worked up with blood cultures, infectious disease consult, UA, and chest imaging without confirming site of infection or sepsis, and was empirically treated with Vancomycin and cefepime which were stopped prior to discharge.  After an extended post operative stay, Cora Senior has progressed satisfactorily with ambulation and pain management and without  medical stabilization.  Patient is confident in their discharge and has safely met goals with PT and OT. Pt lives at home with wife available and will be discharged to home based on home living conditions and level of support.  Cora Senior leaves the hospital in stable condition with good chance for recovery.  Today: Pain is minimal on tylenol and atarax, eating, voiding, ambulating well with crutches including stairs, understands restrictions and is confident in  discharge to home.  Medicine, infectious disease and PT have signed off, and patient appears stable from Ortho standpoint.    Pain: tolerable.   Nausea: none.  Light headedness : none  Chest pain: none  Shortness of breath: none              Assessment and Plan:     S/P Left IVONNE Day #5.  Final Diagnosis: same.  VSS, Hemodynamically asymptomatic, pain management adequate.    PLAN:  DVT prophylaxis with daily aspirin, as patient has no history of VTE.  Pain management with tylenol, oxy PRN.  Bowel regimen.  Hip restrictions.  PT  Ambulatory devices.  PCP follow up.  Patient instructions attached to discharge.      Discharge to home.  Follow up in clinic as previously scheduled, approx 10-14 days post op.    [facility]  [address] [place]  [address]  [phone] fax  [phone] for scheduling                      Physical Exam:   Patient Vitals for the past 12 hrs:   BP Temp Temp src Pulse Resp SpO2   06/19/23 0724 108/76 98.7  F (37.1  C) Temporal 106 16 94 %   06/18/23 2310 112/69 98.2  F (36.8  C) Temporal 101 16 94 %   06/18/23 2151 -- -- -- -- 16 --   06/18/23 2106 -- -- -- -- 14 --     Hemoglobin   Date Value Ref Range Status   06/18/2023 9.2 (L) 13.3 - 17.7 g/dL Final   06/17/2023 9.2 (L) 13.3 - 17.7 g/dL Final       Patient is alert and oriented.  Vitals: stable  Neurovascular status: Grossly intact to LLE.  Dressing: clean and dry  Calves: soft and non tender          Carlin Lee PA-C  [facility] - Surgery    6/19/2023

## 2023-06-19 NOTE — PLAN OF CARE
"Physical Therapy Discharge Summary    Reason for therapy discharge:    Discharged to home with outpatient therapy.    Progress towards therapy goal(s). See goals on Care Plan in [facility] electronic health record for goal details.  Goals partially met.  Barriers to achieving goals:   discharge from facility.    Therapy recommendation(s):    Continued therapy is recommended.  Rationale/Recommendations:  Per prior PT recommendation, \"Pt making huge progress today compared to previous session this PT worked with him.  With crutches pt able to walk a total of >200' with CGA progressing to SBA and able to ascend/descend x4 steps x2 with CGA and crutches.  Pt reports spouse is off work another 9 days (took time off for this surgery) and is able and willing to help at home.  Pt would be safe to discharge home with spouse providing CGA on stairs and HHPT\".      "

## 2023-06-19 NOTE — PLAN OF CARE
Patient vital signs are at baseline: Yes except elevated HR. Per tele: Sinus tachy, rate 118  Patient able to ambulate as they were prior to admission or with assist devices provided by therapies during their stay:  Yes, SBA with crutches  Patient MUST void prior to discharge:  Yes  Patient able to tolerate oral intake:  Yes  Pain has adequate pain control using Oral analgesics:  Yes  Does patient have an identified :  Yes, wife   Has goal D/C date and time been discussed with patient:  Yes home today      Aox4. Able to express needs well. VSS except elevated HR. Denies pain, N/V or diarrhea. No chest pain or fever. Lung sounds clear. Bowel sounds active in all four quads. No BM but passing gas. CMS intact. Dressing clean, dry and intact.    Pt in agreement with discharge. Expressed understanding of discharge plan. Discharge packet reviewed and signed. IV removed. Personal belongings and discharge meds taken home. Pt wheeled off unit by support staff.

## 2023-06-19 NOTE — PROGRESS NOTES
Care Management Discharge Note    Discharge Date: 06/19/2023       Discharge Disposition: Outpatient Rehab (PT, OT, SLP, Cardiac or Pulmonary)    Discharge Services:      Discharge DME:      Discharge Transportation: agency    Private pay costs discussed: Not applicable    Does the patient's insurance plan have a 3 day qualifying hospital stay waiver?  No      Persons Notified of Discharge Plans: patient  Patient/Family in Agreement with the Plan: yes    Additional Information:  Pt's discharge recommendations changed to home with assist with OP therapies.  [facility] for [facility] therapy appt desk, after seeing pt has initial intake appt for therapy today at 9:30, requesting an appt change since pt is still in hospital.  Requested they contact pt to reschedule.    Informed pt of above.    OPCC ordered.    Sierra Husain, ESTEPHANIA, LICSW, LADC  Inpatient Care Coordination  [facility]  [phone]      SIERRA HUSAIN

## 2023-06-19 NOTE — PLAN OF CARE
Goal Outcome Evaluation:  Vital signs stable, except tachycardic, pt asymptomatic, on remote telemetry monitoring.  Lungs clear, encouraged inspirometer use.  Ambulated with crutches and sba of 1.  Voiding, cms  intact.  Hgb 9.2.  Blood glucose monitored.  Dressing intact to left hip, swelling still present.  Pain controlled with tylenol and atarax.    Plan of Care Reviewed With: patient

## 2023-06-20 LAB
BACTERIA BLD CULT: NO GROWTH
BACTERIA BLD CULT: NO GROWTH
BACTERIA TISS BX CULT: NORMAL

## 2023-06-20 NOTE — PROGRESS NOTES
Occupational Therapy Discharge Summary    Reason for therapy discharge:    Discharged to home with home therapy.    Progress towards therapy goal(s). See goals on Care Plan in [facility] electronic health record for goal details.  Goals partially met.  Barriers to achieving goals:   discharge from facility.    Therapy recommendation(s):    Continued therapy is recommended.  Rationale/Recommendations:  assist from wife as needed for ADL. HH PT.

## 2023-06-22 LAB
BACTERIA BLD CULT: NO GROWTH
BACTERIA BLD CULT: NO GROWTH

## 2023-06-26 ENCOUNTER — THERAPY VISIT (OUTPATIENT)
Dept: PHYSICAL THERAPY | Facility: REHABILITATION | Age: 43
End: 2023-06-26
Attending: ORTHOPAEDIC SURGERY
Payer: COMMERCIAL

## 2023-06-26 DIAGNOSIS — Z96.642 S/P TOTAL LEFT HIP ARTHROPLASTY: Primary | ICD-10-CM

## 2023-06-26 PROCEDURE — 97161 PT EVAL LOW COMPLEX 20 MIN: CPT | Mod: GP

## 2023-06-26 PROCEDURE — 97110 THERAPEUTIC EXERCISES: CPT | Mod: GP

## 2023-06-26 ASSESSMENT — ACTIVITIES OF DAILY LIVING (ADL)
SITTING_FOR_15_MINUTES: NO DIFFICULTY AT ALL
HOS_ADL_SCORE(%): 60.29
HOS_ADL_ITEM_SCORE_TOTAL: 41
LIGHT_TO_MODERATE_WORK: SLIGHT DIFFICULTY
RECREATIONAL_ACTIVITIES: SLIGHT DIFFICULTY
HOW_WOULD_YOU_RATE_YOUR_CURRENT_LEVEL_OF_FUNCTION_DURING_YOUR_USUAL_ACTIVITIES_OF_DAILY_LIVING_FROM_0_TO_100_WITH_100_BEING_YOUR_LEVEL_OF_FUNCTION_PRIOR_TO_YOUR_HIP_PROBLEM_AND_0_BEING_THE_INABILITY_TO_PERFORM_ANY_OF_YOUR_USUAL_DAILY_ACTIVITIES?: 40
WALKING_UP_STEEP_HILLS: SLIGHT DIFFICULTY
WALKING_INITIALLY: MODERATE DIFFICULTY
GOING_DOWN_1_FLIGHT_OF_STAIRS: SLIGHT DIFFICULTY
HOS_ADL_COUNT: 17
TWISTING/PIVOTING_ON_INVOLVED_LEG: MODERATE DIFFICULTY
WALKING_DOWN_STEEP_HILLS: SLIGHT DIFFICULTY
WALKING_15_MINUTES_OR_GREATER: EXTREME DIFFICULTY
DEEP_SQUATTING: EXTREME DIFFICULTY
STEPPING_UP_AND_DOWN_CURBS: SLIGHT DIFFICULTY
GETTING_INTO_AND_OUT_OF_A_BATHTUB: MODERATE DIFFICULTY
GETTING_INTO_AND_OUT_OF_AN_AVERAGE_CAR: SLIGHT DIFFICULTY
GOING_UP_1_FLIGHT_OF_STAIRS: SLIGHT DIFFICULTY
HOS_ADL_HIGHEST_POTENTIAL_SCORE: 68
PUTTING_ON_SOCKS_AND_SHOES: MODERATE DIFFICULTY
WALKING_APPROXIMATELY_10_MINUTES: MODERATE DIFFICULTY
HEAVY_WORK: EXTREME DIFFICULTY
STANDING_FOR_15_MINUTES: SLIGHT DIFFICULTY
ROLLING_OVER_IN_BED: SLIGHT DIFFICULTY

## 2023-06-26 NOTE — PROGRESS NOTES
PHYSICAL THERAPY EVALUATION  Type of Visit: Evaluation    See electronic medical record for Abuse and Falls Screening details.     Subjective Pt reports that he had L IVONNE surgery on 6/13/2023. Pt reports that he has difficulty standing for a long period of time. Pt reports that he can only stand for about 10-15 minutes before in pain. Pt reports that he had issues with his LLE for about 20 years and needed to use his arms to move his LLE. Pt reports that he does have hip precautions. Pt reports that he has been walking with two crutches; pt reports that he cannot walk with only one crutch yet. Pt reports that he has only been using Tylenol for mild pain and hasn't had much severe pain. Pt reports that he does have weakness in both quadricep muscles. Pt reports no numbness or tingling.     Presenting condition or subjective complaint: I have to join the therapy after five days of Total Hip Replacement Surgery, to recover from hip replacement surgery  Date of onset: 06/13/23    Relevant medical history:   Diabetes, osteoarthritis, smoking  Dates & types of surgery:  L IVONNE 6/13/2023    Prior diagnostic imaging/testing results: MRI; CT scan; X-ray; EMG; Bone scan; Video fluoroscopic swallow study     Prior therapy history for the same diagnosis, illness or injury: No      Prior Level of Function   Transfers: Independent  Ambulation: Independent      Living Environment  Social support: With family members   Type of home: Apartment/condo   Stairs to enter the home: Yes 4 Is there a railing: Yes   Ramp: No   Stairs inside the home: Yes 10 Is there a railing: Yes   Help at home: Self Cares (home health aide/personal care attendant, family, etc)  Equipment owned: Walker, crutches    Employment: No    Hobbies/Interests:  photography, video editing    Patient goals for therapy: Walking and Running, walking without crutches    Pain assessment: Pain present     Objective   HIP EVALUATION  PAIN: Pain Level at Rest: 2/10  Pain  Level with Use: 4/10  Pain Location: hip and left hip  Pain Quality: Aching  Pain Frequency: intermittent or daily  Pain is Worst: with movement, walking, standing  Pain is Exacerbated By: walking more than 10 minutes, standing more than 10 minutes  Pain is Relieved By: tylenol, hasn't used ice that much  Pain Progression: Improved    POSTURE: WFL  GAIT:   Weightbearing Status: WBAT  Assistive Device(s): Crutches (bilateral)  Gait Deviations: Antalgic  Stance time decreased  Stride length decreased  BALANCE/PROPRIOCEPTION: WFL  ROM:   (Degrees) Left AROM Left PROM  Right AROM Right PROM   Hip Flexion 95  WFL   Hip Extension 0 WFL 0 WFL   Hip Abduction WFL WFL WFL WFL   Hip Adduction Not tested due to precautions Not tested due to precautions WFL WFL   Hip Internal Rotation Not tested due to precautions Not tested due to precautions WFL WFL   Hip External Rotation Not tested due to precautions Not tested due to precautions WFL WFL   Knee Flexion WFL WFL WFL WFL   Knee Extension WFL WFL WFL WFL   Lumbar Side glide 50% 50%   Lumbar Flexion 50%   Lumbar Extension 25%     STRENGTH:   Pain: - none + mild ++ moderate +++ severe  Strength Scale: 0-5/5 Left Right   Hip Flexion 3- 4+   Hip Extension 3 4+   Hip Abduction 3 4+                  Knee Flexion 4 4+   Knee Extension 4 4+     LE FLEXIBILITY: WFL  SPECIAL TESTS:    Left Right   ABDI not assessed Negative    FADIR/Labrum/RONALDO not assessed Negative        Assessment & Plan   CLINICAL IMPRESSIONS   Medical Diagnosis: Orthopedic aftercare    Treatment Diagnosis: LLE pain s/p left total hip arthroplasty   Impression/Assessment: Patient is a 43 year old male with left hip pain s/p left total hip arthroplasty complaints.  The following significant findings have been identified: Pain, Decreased ROM/flexibility, Decreased strength, Impaired balance, Impaired gait and Decreased activity tolerance. These impairments interfere with their ability to perform self care tasks,  work tasks, recreational activities, household chores, driving , household mobility and community mobility as compared to previous level of function.     Clinical Decision Making (Complexity):   Clinical Presentation: Stable/Uncomplicated  Clinical Presentation Rationale: based on medical and personal factors listed in PT evaluation  Clinical Decision Making (Complexity): Low complexity    PLAN OF CARE  Treatment Interventions:  Modalities: Cryotherapy, E-stim, Hot Pack  Interventions: Gait Training, Manual Therapy, Neuromuscular Re-education, Therapeutic Activity, Therapeutic Exercise, Self-Care/Home Management    Long Term Goals     PT Goal 1  Goal Identifier: HEP  Goal Description: Pt will be independent with HEP to improve mobility and decrease pain.  Target Date: 09/17/23  PT Goal 2  Goal Identifier: Standing  Goal Description: Pt will report ability to stand greater than 30 minutes with less than 1/10 pain to improve functional mobility and increase tolerance completing ADL's, recreational activities and work related tasks.  Target Date: 09/17/23  PT Goal 3  Goal Identifier: Walking  Goal Description: Pt will report ability to walk greater than 30 minutes with no AD and less than 1/10 pain to improve functional mobility and increase tolerance completing ADL's, recreational activities and work related tasks.  Target Date: 09/17/23      Frequency of Treatment: 1 time a week  Duration of Treatment: 1 time a week or every other week for a minimum of 12 weeks, minimum of 6 sessions    Recommended Referrals to Other Professionals:  none  Education Assessment:   Learner/Method: Patient;Demonstration;Pictures/Video    Risks and benefits of evaluation/treatment have been explained.   Patient/Family/caregiver agrees with Plan of Care.     Evaluation Time:     PT Eval, Low Complexity Minutes (40731): 20   Present: Not applicable    Signing Clinician: Romy Austin PT      [facility]                                                                                   OUTPATIENT PHYSICAL THERAPY      PLAN OF TREATMENT FOR OUTPATIENT REHABILITATION   Patient's Last Name, First Name, Cora Alex YOB: 1980   Provider's Name   [facility]   Medical Record No.  [id number]     Onset Date: 06/13/23  Start of Care Date: 06/26/23     Medical Diagnosis:  Orthopedic aftercare      PT Treatment Diagnosis:  LLE pain s/p left total hip arthroplasty Plan of Treatment  Frequency/Duration: 1 time a week/ 1 time a week or every other week for a minimum of 12 weeks, minimum of 6 sessions    Certification date from 06/26/23 to 09/17/23         See note for plan of treatment details and functional goals     Romy Austin, PT                         I CERTIFY THE NEED FOR THESE SERVICES FURNISHED UNDER        THIS PLAN OF TREATMENT AND WHILE UNDER MY CARE     (Physician attestation of this document indicates review and certification of the therapy plan).                  Referring Provider:  Dann Orozco      Initial Assessment  See Epic Evaluation- Start of Care Date: 06/26/23      I have reviewed record and images and concur with evaluation and treatment plan.    Dann Orozco MD

## 2023-06-30 ENCOUNTER — OFFICE VISIT (OUTPATIENT)
Dept: ORTHOPEDICS | Facility: CLINIC | Age: 43
End: 2023-06-30
Payer: COMMERCIAL

## 2023-06-30 DIAGNOSIS — Z79.2 NEED FOR PROPHYLACTIC ANTIBIOTIC: Primary | ICD-10-CM

## 2023-06-30 PROCEDURE — 99024 POSTOP FOLLOW-UP VISIT: CPT | Performed by: PHYSICIAN ASSISTANT

## 2023-06-30 RX ORDER — CEFADROXIL 500 MG/1
500 CAPSULE ORAL 2 TIMES DAILY
Qty: 28 CAPSULE | Refills: 0 | Status: SHIPPED | OUTPATIENT
Start: 2023-06-30 | End: 2024-03-05

## 2023-06-30 NOTE — LETTER
6/30/2023         RE: Cora Senior  [address]  [place] [address]        Dear Colleague,    Thank you for referring your patient, Cora Senior, to the [facility] [place]. Please see a copy of my visit note below.    ASSESSMENT/PLAN:  Cora Senior is a 43 year old who is status post L IVONNE with Dr. Orozco on 6/13/23.    He was started on Duricef 500mg BID for his incisional drainage and directed to closely monitor the wound. Directed to paint the incision daily with betadine and cover with non-adherent dressing. He will monitor for worsening opening of the wound, onset of redness, fevers, chills or sweats and notify clinic urgently if this occurs. He may shower and pat dry but is to refrain from submerging in a tub or pool. Reviewed the risk of possible need for irrigation/debridement and revision closure if wound fails to close. He demonstrates understanding.     The patient will see Dr. Orozco in 2 weeks for incision check. Thet has our clinic number and will call with any questions or concerns.    Marisabel Bansal PA-C  Orthopaedic Surgery    _________________________________    HISTORY OF PRESENT ILLNESS:  Cora Senior is a 43 year old male who is approximately 2 weeks status post the above procedure.    Weight bearing status/devices: WBAT, crutches  Pain level and management: pain is 0/10, currently using APAP intermittent   Physical therapy & ROM: Romy Austin, [facility]     The patient endorses swelling around the surgical incision but denies surrounding redness. Bandage has been in place since surgery. Thet denies recent fevers and chills, as well as any other symptoms concerning for infection.     DVT prophylaxis: ASA 81mg BID x 4 weeks  Patient denies calf pain or tenderness.      MEDICATIONS:   Current Outpatient Rx   Medication Sig Dispense Refill    acetaminophen (TYLENOL) 325 MG tablet Take 2 tablets (650 mg) by mouth every 4 hours as needed for  other (mild pain) 100 tablet 0    allopurinol (ZYLOPRIM) 100 MG tablet Take 1 tablet (100 mg) by mouth daily Take with one 300 mg tablet daily 90 tablet 1    allopurinol (ZYLOPRIM) 300 MG tablet Take 1 tablet (300 mg) by mouth daily 90 tablet 3    aspirin 81 MG EC tablet Take 1 tablet (81 mg) by mouth 2 times daily 60 tablet 0    atorvastatin (LIPITOR) 20 MG tablet Take 1 tablet (20 mg) by mouth daily 90 tablet 4    ferrous sulfate (FEROSUL) 325 (65 Fe) MG tablet Take 1 tablet (325 mg) by mouth every other day 30 tablet 1    indomethacin (INDOCIN) 50 MG capsule Take 1 capsule (50 mg) by mouth 2 times daily (with meals) 50 capsule 4    lisinopril (ZESTRIL) 2.5 MG tablet Take 1 tablet (2.5 mg) by mouth daily 30 tablet 11    metFORMIN (GLUCOPHAGE XR) 500 MG 24 hr tablet Take 4 tablets (2,000 mg) by mouth daily (with dinner) 120 tablet 11    multivitamin, therapeutic (THERA-VIT) TABS tablet Take 1 tablet by mouth daily 90 tablet 3         ALLERGIES: Patient has no known allergies.       PHYSICAL EXAMINATION:   On physical examination the patient is comfortable and is in no acute distress. The affect is appropriate and breathing is non-labored.    Surgical wound: The surgical wound was exposed and found to be approximated. There is an area of serosanguinous drainage from distal incision. There was trapped moisture under bandage with some maceration of the skin. . There is mild edema around the incision. There is no erythema. The skin was appropriately warm to touch.            ROM: tolerates gentle PROM of the hip, unable to actively flex hip.   Isometric activation of the quadriceps: in tact  SLR: in tact  Gait: ambulates with two crutche, anatalgic favoring left leg.     Motor intact distally throughout the tibial and peroneal nerve distributions, 5/5 strength with tibialis anterior, gastrocnemius and soleus, EHL, FHL firing  Sensation intact to light touch throughout superficial peroneal, deep peroneal, tibial,  saphenous, and sural nerves  Dorsalis pedis and posterior tibial pulses palpable, toes warm and well-perfused        Again, thank you for allowing me to participate in the care of your patient.        Sincerely,        Marisabel Bansal PA-C

## 2023-06-30 NOTE — NURSING NOTE
Reason For Visit:   Chief Complaint   Patient presents with     Surgical Followup     DOS 06/13/23. Left ARTHROPLASTY, HIP, TOTAL with Dr. Orozco       There were no vitals taken for this visit.    Pain Assessment  Patient Currently in Pain: Jeffrey Neves ATC

## 2023-07-03 ENCOUNTER — THERAPY VISIT (OUTPATIENT)
Dept: PHYSICAL THERAPY | Facility: REHABILITATION | Age: 43
End: 2023-07-03
Attending: ORTHOPAEDIC SURGERY
Payer: COMMERCIAL

## 2023-07-03 DIAGNOSIS — Z96.642 S/P TOTAL LEFT HIP ARTHROPLASTY: Primary | ICD-10-CM

## 2023-07-03 PROCEDURE — 97112 NEUROMUSCULAR REEDUCATION: CPT | Mod: GP

## 2023-07-03 PROCEDURE — 97110 THERAPEUTIC EXERCISES: CPT | Mod: GP

## 2023-07-11 LAB — BACTERIA TISS BX CULT: NO GROWTH

## 2023-07-14 ENCOUNTER — THERAPY VISIT (OUTPATIENT)
Dept: PHYSICAL THERAPY | Facility: REHABILITATION | Age: 43
End: 2023-07-14
Payer: COMMERCIAL

## 2023-07-14 ENCOUNTER — ANCILLARY PROCEDURE (OUTPATIENT)
Dept: GENERAL RADIOLOGY | Facility: CLINIC | Age: 43
End: 2023-07-14
Attending: ORTHOPAEDIC SURGERY
Payer: COMMERCIAL

## 2023-07-14 ENCOUNTER — OFFICE VISIT (OUTPATIENT)
Dept: ORTHOPEDICS | Facility: CLINIC | Age: 43
End: 2023-07-14
Payer: COMMERCIAL

## 2023-07-14 DIAGNOSIS — Z96.642 S/P TOTAL LEFT HIP ARTHROPLASTY: Primary | ICD-10-CM

## 2023-07-14 DIAGNOSIS — Z96.642 S/P TOTAL LEFT HIP ARTHROPLASTY: ICD-10-CM

## 2023-07-14 PROCEDURE — 99024 POSTOP FOLLOW-UP VISIT: CPT | Performed by: ORTHOPAEDIC SURGERY

## 2023-07-14 PROCEDURE — 73502 X-RAY EXAM HIP UNI 2-3 VIEWS: CPT | Mod: LT | Performed by: RADIOLOGY

## 2023-07-14 PROCEDURE — 97110 THERAPEUTIC EXERCISES: CPT | Mod: GP | Performed by: PHYSICAL THERAPIST
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
## 2023-07-14 NOTE — NURSING NOTE
Reason For Visit:   Chief Complaint   Patient presents with     Surgical Followup     Post op follow-up Left  IVONNE DOS: 6/13/2023 states that incision is not fully closed and is still bleeding        There were no vitals taken for this visit.    Pain Assessment  Patient Currently in Pain: Jeffrey Owens, EMT

## 2023-07-17 ENCOUNTER — TELEPHONE (OUTPATIENT)
Dept: FAMILY MEDICINE | Facility: CLINIC | Age: 43
End: 2023-07-17
Payer: COMMERCIAL

## 2023-07-17 NOTE — TELEPHONE ENCOUNTER
Please call this patient. I could see him one of the next two days if the openings remain available, or else I should see him in follow up as soon as it can be scheduled. (if it is easier, the appointment could be done by phone, although Dr. Vela will likely want some labs done eventually)  Let him know it seems he is doing well and the surgery was a success, per how Dr. Vela sees the surgical and hospital notes.

## 2023-07-17 NOTE — TELEPHONE ENCOUNTER
Spoke to patient and scheduled, there were no openings in the next few days.     With Family Practice (Hanny Vela MD)  08/16/2023 at 8:30 AM 8:10 am arrival    Pt confirms he is doing well.

## 2023-07-19 ENCOUNTER — TELEPHONE (OUTPATIENT)
Dept: PHYSICAL THERAPY | Facility: REHABILITATION | Age: 43
End: 2023-07-19

## 2023-07-19 ENCOUNTER — THERAPY VISIT (OUTPATIENT)
Dept: PHYSICAL THERAPY | Facility: REHABILITATION | Age: 43
End: 2023-07-19
Payer: COMMERCIAL

## 2023-07-19 DIAGNOSIS — Z96.642 S/P TOTAL LEFT HIP ARTHROPLASTY: Primary | ICD-10-CM

## 2023-07-19 PROCEDURE — 97110 THERAPEUTIC EXERCISES: CPT | Mod: GP

## 2023-07-19 PROCEDURE — 97112 NEUROMUSCULAR REEDUCATION: CPT | Mod: GP

## 2023-07-19 NOTE — TELEPHONE ENCOUNTER
Left voicemail for pt. Pt no show for PT appt on Wednesday, July 19th at 9:30am. Pt reminded of next PT appt on Wednesday, July 26th at 9:15am. Pt to call with any questions.  Romy Austin, PT, DPT

## 2023-07-26 ENCOUNTER — THERAPY VISIT (OUTPATIENT)
Dept: PHYSICAL THERAPY | Facility: REHABILITATION | Age: 43
End: 2023-07-26
Payer: COMMERCIAL

## 2023-07-26 DIAGNOSIS — Z96.642 S/P TOTAL LEFT HIP ARTHROPLASTY: Primary | ICD-10-CM

## 2023-07-26 PROCEDURE — 97530 THERAPEUTIC ACTIVITIES: CPT | Mod: GP | Performed by: PHYSICAL THERAPIST

## 2023-07-26 PROCEDURE — 97112 NEUROMUSCULAR REEDUCATION: CPT | Mod: GP | Performed by: PHYSICAL THERAPIST

## 2023-07-26 PROCEDURE — 97110 THERAPEUTIC EXERCISES: CPT | Mod: GP | Performed by: PHYSICAL THERAPIST

## 2023-07-28 ENCOUNTER — OFFICE VISIT (OUTPATIENT)
Dept: ORTHOPEDICS | Facility: CLINIC | Age: 43
End: 2023-07-28
Payer: COMMERCIAL

## 2023-07-28 DIAGNOSIS — Z96.642 S/P TOTAL LEFT HIP ARTHROPLASTY: Primary | ICD-10-CM

## 2023-07-28 PROCEDURE — 99024 POSTOP FOLLOW-UP VISIT: CPT | Performed by: ORTHOPAEDIC SURGERY
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
## 2023-07-28 NOTE — NURSING NOTE
Reason For Visit:   Chief Complaint   Patient presents with    RECHECK     Patient returns 6w s/p left IVONNE and 2w since last seen in clinic.  He completed XR last visit.  Patient reports continued improvement with pain in left hip, there's not much pain today.  He states ambulation is improving over time.  Recent onset of bilateral anterolateral knee pain that comes and goes.  Treating with topical creams.       There were no vitals taken for this visit.    Pain Assessment  Patient Currently in Pain: Yes  0-10 Pain Scale: 1  Primary Pain Location: Hip (left)    Dann Mittal, ATC

## 2023-08-02 ENCOUNTER — THERAPY VISIT (OUTPATIENT)
Dept: PHYSICAL THERAPY | Facility: REHABILITATION | Age: 43
End: 2023-08-02
Payer: COMMERCIAL

## 2023-08-02 DIAGNOSIS — Z96.642 S/P TOTAL LEFT HIP ARTHROPLASTY: Primary | ICD-10-CM

## 2023-08-02 PROCEDURE — 97110 THERAPEUTIC EXERCISES: CPT | Mod: GP | Performed by: PHYSICAL THERAPIST

## 2023-08-02 PROCEDURE — 97530 THERAPEUTIC ACTIVITIES: CPT | Mod: GP | Performed by: PHYSICAL THERAPIST

## 2023-08-02 PROCEDURE — 97112 NEUROMUSCULAR REEDUCATION: CPT | Mod: GP | Performed by: PHYSICAL THERAPIST

## 2023-08-09 ENCOUNTER — THERAPY VISIT (OUTPATIENT)
Dept: PHYSICAL THERAPY | Facility: REHABILITATION | Age: 43
End: 2023-08-09
Payer: COMMERCIAL

## 2023-08-09 DIAGNOSIS — Z96.642 S/P TOTAL LEFT HIP ARTHROPLASTY: Primary | ICD-10-CM

## 2023-08-09 LAB
ACID FAST STAIN (ARUP): NORMAL

## 2023-08-09 PROCEDURE — 97110 THERAPEUTIC EXERCISES: CPT | Mod: GP

## 2023-08-09 ASSESSMENT — ACTIVITIES OF DAILY LIVING (ADL)
HOS_ADL_COUNT: 17
WALKING_INITIALLY: NO DIFFICULTY AT ALL
HOS_ADL_ITEM_SCORE_TOTAL: 66
WALKING_APPROXIMATELY_10_MINUTES: NO DIFFICULTY AT ALL
GOING_DOWN_1_FLIGHT_OF_STAIRS: NO DIFFICULTY AT ALL
STEPPING_UP_AND_DOWN_CURBS: NO DIFFICULTY AT ALL
DEEP_SQUATTING: SLIGHT DIFFICULTY
SITTING_FOR_15_MINUTES: NO DIFFICULTY AT ALL
ROLLING_OVER_IN_BED: NO DIFFICULTY AT ALL
TWISTING/PIVOTING_ON_INVOLVED_LEG: NO DIFFICULTY AT ALL
LIGHT_TO_MODERATE_WORK: NO DIFFICULTY AT ALL
GOING_UP_1_FLIGHT_OF_STAIRS: NO DIFFICULTY AT ALL
HEAVY_WORK: SLIGHT DIFFICULTY
HOS_ADL_HIGHEST_POTENTIAL_SCORE: 68
RECREATIONAL_ACTIVITIES: NO DIFFICULTY AT ALL
PUTTING_ON_SOCKS_AND_SHOES: SLIGHT DIFFICULTY
GETTING_INTO_AND_OUT_OF_A_BATHTUB: NO DIFFICULTY AT ALL
HOW_WOULD_YOU_RATE_YOUR_CURRENT_LEVEL_OF_FUNCTION_DURING_YOUR_USUAL_ACTIVITIES_OF_DAILY_LIVING_FROM_0_TO_100_WITH_100_BEING_YOUR_LEVEL_OF_FUNCTION_PRIOR_TO_YOUR_HIP_PROBLEM_AND_0_BEING_THE_INABILITY_TO_PERFORM_ANY_OF_YOUR_USUAL_DAILY_ACTIVITIES?: 95
GETTING_INTO_AND_OUT_OF_AN_AVERAGE_CAR: NO DIFFICULTY AT ALL
WALKING_UP_STEEP_HILLS: NO DIFFICULTY AT ALL
WALKING_15_MINUTES_OR_GREATER: NO DIFFICULTY AT ALL
WALKING_DOWN_STEEP_HILLS: NO DIFFICULTY AT ALL
STANDING_FOR_15_MINUTES: NO DIFFICULTY AT ALL
HOS_ADL_SCORE(%): 97.06

## 2023-08-11 NOTE — PROGRESS NOTES
DISCHARGE  Reason for Discharge: Patient has met all goals.  Patient chooses to discontinue therapy.    Equipment Issued: none    Discharge Plan: Patient to continue home program.  Pt to receive new referral for PT from doctor to schedule more PT appointments.    Referring Provider:  Dann Orozco       08/09/23 0500   Appointment Info   Signing clinician's name / credentials Romy Austin, PT, DPT   Visits Used 6   Medical Diagnosis Orthopedic aftercare   PT Tx Diagnosis LLE pain s/p left total hip arthroplasty   Quick Adds Certification   Progress Note/Certification   Start of Care Date 06/26/23   Onset of illness/injury or Date of Surgery 06/13/23   Therapy Frequency 1 time a week   Predicted Duration 1 time a week or every other week for a minimum of 12 weeks, minimum of 6 sessions   Certification date from 06/26/23   Certification date to 09/17/23   Progress Note Due Date 07/25/23       Present No   GOALS   PT Goals 2;3;4   PT Goal 1   Goal Identifier HEP   Goal Description Pt will be independent with HEP to improve mobility and decrease pain.   Goal Progress Goal Met   Target Date 09/17/23   Date Met 08/09/23   PT Goal 2   Goal Identifier Standing   Goal Description Updated: Pt will report ability to stand greater than 3 hours with less than 1/10 pain to improve functional mobility and increase tolerance completing ADL's, recreational activities and work related tasks.   Goal Progress Goal Met- pt reports that he can stand for about 2-3 hours with minimal pain   Target Date 09/17/23   Date Met 08/09/23   PT Goal 3   Goal Identifier Walking   Goal Description Updated: Pt will report ability to walk greater than 2 hours with no AD and less than 1/10 pain to improve functional mobility and increase tolerance completing ADL's, recreational activities and work related tasks.   Goal Progress Goal Met- pt reports that he has been able to walk for at least 2-3 hours with no pain   Target Date  09/17/23   Date Met 08/09/23   PT Goal 4   Goal Identifier Gait   Goal Description Pt will ambulate with even gait with no pain to improve functional mobility   Goal Progress Goal Met- pt reports that he feels like he is walking with a smooth even gait   Target Date 09/17/23   Date Met 08/09/23   Subjective Report   Subjective Report Pt reports that he has been having 0/10 pain in his L hip. Pt reports that he is okay with discharging from PT today, reporting no concerns. Pt reports does have some L anteriorlateral knee pain.   Objective Measures   Objective Measures Objective Measure 2;Objective Measure 3   Objective Measure 2   Objective Measure L hip ROM   Details With heel slides/AROM 130 degrees, PROM in supine about 90 degrees, pt reports stiffness in end range with PROM, hard end feel   Objective Measure 3   Objective Measure Hip Outcome Score (HOS)   Details ADL Score: 97%, Sport Score: 67%   Treatment Interventions (PT)   Interventions Therapeutic Procedure/Exercise;Neuromuscular Re-education;Therapeutic Activity   Therapeutic Procedure/Exercise   Therapeutic Procedures: strength, endurance, ROM, flexibillity minutes (83541) 38   Ther Proc 1 - Details Upright bike seat 6 x 6 minutes, subjective and objective measures taken, review over HEP, bridges x 10, bridges with single leg x 10B, single leg squat x 10B, standing quadriceps stretch with chair x 30 second holds, single leg stance x 10, monster walks x 10' x 4 x lvl 3 theraband, lateral step up on bosu ball x 10B, tandem walking x 10' x 2, heel walking x 10' x 2, toe walking x 10' x 2   Skilled Intervention Education on exercises for HEP. Verbal cues, tactile cues and assist with correct technique. Pt educated to complete exercises within pain.   Patient Response/Progress Tolerated well   Education   Learner/Method Patient;Demonstration;Pictures/Video   Plan   Home program PTRx: heel slides, quad sets, SLR, LAQ, hooklying roll outs, hooklying roll ins,  sidelying hip abduction SLR, side stepping with and without theraband, single leg stance, standing marches, sit<>stand with no hands, standing hip abduction   Plan for next session Discharge PT   Comments   Comments Pt is a 43 year old male who presents to physical therapy discharge session with reports improvements in L hip pain s/p L total hip arthroplasty on 6/13/2023. Pt has improved functional mobility and increased tolerance completing ADL's, recreational activities and work related tasks.   Total Session Time   Timed Code Treatment Minutes 38   Total Treatment Time (sum of timed and untimed services) 38     Romy Austin, PT, DPT

## 2023-08-16 ENCOUNTER — OFFICE VISIT (OUTPATIENT)
Dept: FAMILY MEDICINE | Facility: CLINIC | Age: 43
End: 2023-08-16
Payer: COMMERCIAL

## 2023-08-16 ENCOUNTER — PATIENT OUTREACH (OUTPATIENT)
Dept: CARE COORDINATION | Facility: CLINIC | Age: 43
End: 2023-08-16

## 2023-08-16 VITALS
SYSTOLIC BLOOD PRESSURE: 112 MMHG | HEIGHT: 70 IN | TEMPERATURE: 98.1 F | HEART RATE: 97 BPM | DIASTOLIC BLOOD PRESSURE: 78 MMHG | BODY MASS INDEX: 29.32 KG/M2 | OXYGEN SATURATION: 99 % | RESPIRATION RATE: 14 BRPM | WEIGHT: 204.8 LBS

## 2023-08-16 DIAGNOSIS — Z00.00 PREVENTATIVE HEALTH CARE: ICD-10-CM

## 2023-08-16 DIAGNOSIS — Z96.642 S/P TOTAL LEFT HIP ARTHROPLASTY: ICD-10-CM

## 2023-08-16 DIAGNOSIS — E55.9 VITAMIN D DEFICIENCY: ICD-10-CM

## 2023-08-16 DIAGNOSIS — N18.2 TYPE 2 DIABETES MELLITUS WITH STAGE 2 CHRONIC KIDNEY DISEASE, WITHOUT LONG-TERM CURRENT USE OF INSULIN (H): ICD-10-CM

## 2023-08-16 DIAGNOSIS — E11.22 TYPE 2 DIABETES MELLITUS WITH STAGE 2 CHRONIC KIDNEY DISEASE, WITHOUT LONG-TERM CURRENT USE OF INSULIN (H): ICD-10-CM

## 2023-08-16 DIAGNOSIS — Z02.89 REFUGEE HEALTH EXAMINATION: ICD-10-CM

## 2023-08-16 PROCEDURE — 99215 OFFICE O/P EST HI 40 MIN: CPT | Performed by: FAMILY MEDICINE

## 2023-08-16 RX ORDER — ERGOCALCIFEROL 1.25 MG/1
50000 CAPSULE, LIQUID FILLED ORAL WEEKLY
Qty: 12 CAPSULE | Refills: 4 | Status: SHIPPED | OUTPATIENT
Start: 2023-08-16

## 2023-08-16 NOTE — PROGRESS NOTES
Assessment & Plan     S/P total left hip arthroplasty  He is moving very well. His skin is about healed but needs more epidermis. He will keep the scar more moist;covered with petroleum jelly until healed. No signs of infection.    Type 2 diabetes mellitus with stage 2 chronic kidney disease, without long-term current use of insulin (H)  1) he needs to see an eye doctor due to his diabetes. Order placed, but he might find an eye clinic near his home.   2) foot exam done  3) he is more active and has lost weight. He might need less metformin. If he starts to feel like he is gettting low sugars, he will let me know. Otherwise, we'll check his A1-C in a couple months. Right now the A1-C would not show much of the significant change he's made to his lifestyle.  - Adult Eye  Referral  - HEMOGLOBIN A1C  - Primary Care - Care Coordination Referral    Preventative health care  reveiwed  - REVIEW OF HEALTH MAINTENANCE PROTOCOL ORDERS  - Primary Care - Care Coordination Referral    Vitamin D deficiency  Continue vit D supplement, but I'll give him 3 month supplies.  - vitamin D2 (ERGOCALCIFEROL) 15562 units (1250 mcg) capsule  Dispense: 12 capsule; Refill: 4  - Primary Care - Care Coordination Referral    Refugee health examination  He requests help with some things - like knowing what income at which they will lose their medical assistance, and some help to know how to pursue purchasing a house (they've been saving every little bit they can) and such things. I offered that a  could provide some help.  - Primary Care - Care Coordination Referral      Review of external notes as documented elsewhere in note  Ordering of each unique test  Prescription drug management  60 minutes spent by me on the date of the encounter doing chart review, history and exam, documentation and further activities per the note     Nicotine/Tobacco Cessation:  He reports that he has been smoking cigarettes. He started smoking  "about 28 years ago. He has been smoking an average of .33 packs per day. He has been exposed to tobacco smoke. He has never used smokeless tobacco.  Nicotine/Tobacco Cessation Plan:   Self help information given to patient    Hanny Vela MD  [facility] SHANIQUE Shepherd is a 43 year old, presenting for the following health issues:  Follow Up (Hip replacement)      8/16/2023     7:48 AM   Additional Questions   Roomed by wagner       History of Present Illness       Reason for visit:  Hip Total Replacement Follow Up    He eats 2-3 servings of fruits and vegetables daily.He consumes 1 sweetened beverage(s) daily.He exercises with enough effort to increase his heart rate 30 to 60 minutes per day.  He exercises with enough effort to increase his heart rate 5 days per week.   He is taking medications regularly.     He is very pleased with the result of his hip surgery. He is now able to move around. He goes to the park daily with his son. They go on family walks. He is amazed at how much he can do now without getting sore and tired. While he knows it will take more time, he can see that he is building muscle in his left leg.     Mixing brown rice with white rice at home to reduce the effect on blood sugars for him and for his wife.    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  Physical Exam   GENERAL: healthy, alert and no distress  RESP: lungs clear to auscultation - no rales, rhonchi or wheezes  CV: regular rate and rhythm, normal S1 S2, no S3 or S4, no murmur, click or rub, no peripheral edema and " peripheral pulses strong  MS: no gross musculoskeletal defects noted, no edema  SKIN: no suspicious lesions or rashes and left hip surgical scar has 6 areas that still need a bit of healing. Around those areas was flaky skin - until I rubbed petroleum jelly on it.  Diabetic foot exam: normal DP and PT pulses, no trophic changes or ulcerative lesions, and normal sensory exam    No results displayed because visit has over 200 results.

## 2023-08-16 NOTE — PROGRESS NOTES
Clinic Care Coordination Contact  Community Health Worker Initial Outreach    CHW Initial Information Gathering:  Referral Source: PCP  Preferred Hospital: [facility]  [phone]  Preferred Urgent Care: [facility] [place], [phone]  Current living arrangement:: I live in a private home with family  Type of residence:: Apartment  Community Resources: None  Supplies Currently Used at Home: None  Equipment Currently Used at Home: none  Informal Support system:: Family  No PCP office visit in Past Year: No  Transportation means:: Family, Accessible car  CHW Additional Questions  If ED/Hospital discharge, follow-up appointment scheduled as recommended?: N/A  Medication changes made following ED/Hospital discharge?: N/A  MyChart active?: No  Patient agreeable to assistance with activating MyChart?: No    Patient accepts CC: No, resources given and patient no longer needs CCC services. Patient will be sent Care Coordination introduction letter for future reference.     CCC received referral that patient just needs resources/information about income guideline to qualify for Medical Assistant. CHW called and spoke with patient and provided [place] that patient can visit the website and review the information. Patient no longer need CCC assistance and PCP feel free to place new referral if need(s) identify.

## 2023-08-22 ENCOUNTER — TRANSFERRED RECORDS (OUTPATIENT)
Dept: HEALTH INFORMATION MANAGEMENT | Facility: CLINIC | Age: 43
End: 2023-08-22

## 2023-08-24 ENCOUNTER — TELEPHONE (OUTPATIENT)
Dept: GASTROENTEROLOGY | Facility: CLINIC | Age: 43
End: 2023-08-24
Payer: COMMERCIAL

## 2023-08-24 NOTE — TELEPHONE ENCOUNTER
"Endoscopy Scheduling Screen    Have you had a positive Covid test in the last 14 days?  No    Are you active on MyChart?   Yes    What insurance is in the chart?  Other:  [facility]    Ordering/Referring Provider:     CHRISTIANA RIVAS      (If ordering provider performs procedure, schedule with ordering provider unless otherwise instructed. )    BMI: Estimated body mass index is 29.32 kg/m  as calculated from the following:    Height as of 8/16/23: 1.78 m (5' 10.08\").    Weight as of 8/16/23: 92.9 kg (204 lb 12.8 oz).     Sedation Ordered  moderate sedation.   If patient BMI > 50 do not schedule in ASC.    Are you taking any prescription medications for pain?   No    Are you taking methadone or Suboxone?  No    Do you have a history of malignant hyperthermia or adverse reaction to anesthesia?  No    (Females) Are you currently pregnant?        Have you been diagnosed or told you have pulmonary hypertension?   No    Do you have an LVAD?  No    Have you been told you have moderate to severe sleep apnea?  No    Have you been told you have COPD, asthma, or any other lung disease?  No    Do you have any heart conditions?  No     Have you ever had or are you awaiting a heart or lung transplant?   No    Have you had a stroke or transient ischemic attack (TIA aka \"mini stroke\" in the last 6 months?   No    Have you been diagnosed with or been told you have cirrhosis of the liver?   No    Are you currently on dialysis?   No    Do you need assistance transferring?   No    BMI: Estimated body mass index is 29.32 kg/m  as calculated from the following:    Height as of 8/16/23: 1.78 m (5' 10.08\").    Weight as of 8/16/23: 92.9 kg (204 lb 12.8 oz).     Is patients BMI > 40 and scheduling location [facility]?  No    Do you take the medication Phentermine, Ozempic or Wegovy?  No    Do you take the medication Naltrexone?  No    Do you take blood thinners?  No      Prep   Are you currently on dialysis or do you have chronic kidney " disease?  No    Do you have a diagnosis of diabetes?  Yes (Golytely Prep)    Do you have a diagnosis of cystic fibrosis (CF)?  No    On a regular basis do you go 3 -5 days between bowel movements?  No    BMI > 40?  No    Preferred Pharmacy:    [id number] - [place], [place] - [address]  [address] [place] [address]  Phone: [phone] Fax: [phone]      Final Scheduling Details   Colonoscopy prep sent?  Standard Golytely    Procedure scheduled  Colonoscopy    Surgeon:       Date of procedure: NO  DECLINED       Schedule PAC:   No    Location  [facility]    Sedation   Moderate Sedation    Patient Reminders:   You will receive a call from a Nurse to review instructions and health history.  This assessment must be completed prior to your procedure.  Failure to complete the Nurse assessment may result in the procedure being cancelled.      On the day of your procedure, please designate an adult(s) who can drive you home stay with you for the next 24 hours. The medicines used in the exam will make you sleepy. You will not be able to drive.      You cannot take public transportation, ride share services, or non-medical taxi service without a responsible caregiver.  Medical transport services are allowed with the requirement that a responsible caregiver will receive you at your destination.  We require that drivers and caregivers are confirmed prior to your procedure.

## 2023-09-14 DIAGNOSIS — E78.5 HYPERLIPIDEMIA LDL GOAL <100: ICD-10-CM

## 2023-09-14 DIAGNOSIS — M1A.9XX1 CHRONIC GOUT INVOLVING TOE WITH TOPHUS, UNSPECIFIED CAUSE, UNSPECIFIED LATERALITY: ICD-10-CM

## 2023-09-14 DIAGNOSIS — M16.12 PRIMARY OSTEOARTHRITIS OF LEFT HIP: ICD-10-CM

## 2023-09-14 RX ORDER — INDOMETHACIN 50 MG/1
50 CAPSULE ORAL 2 TIMES DAILY WITH MEALS
Qty: 50 CAPSULE | Refills: 4 | Status: SHIPPED | OUTPATIENT
Start: 2023-09-14

## 2023-09-14 RX ORDER — ATORVASTATIN CALCIUM 20 MG/1
20 TABLET, FILM COATED ORAL DAILY
Qty: 90 TABLET | Refills: 0 | Status: SHIPPED | OUTPATIENT
Start: 2023-09-14 | End: 2023-11-14

## 2023-09-14 RX ORDER — ALLOPURINOL 100 MG/1
100 TABLET ORAL DAILY
Qty: 90 TABLET | Refills: 4 | Status: SHIPPED | OUTPATIENT
Start: 2023-09-14 | End: 2024-03-05

## 2023-09-29 ENCOUNTER — OFFICE VISIT (OUTPATIENT)
Dept: FAMILY MEDICINE | Facility: CLINIC | Age: 43
End: 2023-09-29
Payer: COMMERCIAL

## 2023-09-29 VITALS
TEMPERATURE: 98.9 F | DIASTOLIC BLOOD PRESSURE: 79 MMHG | OXYGEN SATURATION: 98 % | HEART RATE: 96 BPM | RESPIRATION RATE: 16 BRPM | WEIGHT: 209.6 LBS | BODY MASS INDEX: 30.01 KG/M2 | SYSTOLIC BLOOD PRESSURE: 114 MMHG

## 2023-09-29 DIAGNOSIS — M25.462 EFFUSION OF LEFT KNEE: Primary | ICD-10-CM

## 2023-09-29 DIAGNOSIS — E11.22 TYPE 2 DIABETES MELLITUS WITH STAGE 2 CHRONIC KIDNEY DISEASE, WITHOUT LONG-TERM CURRENT USE OF INSULIN (H): ICD-10-CM

## 2023-09-29 DIAGNOSIS — N18.2 TYPE 2 DIABETES MELLITUS WITH STAGE 2 CHRONIC KIDNEY DISEASE, WITHOUT LONG-TERM CURRENT USE OF INSULIN (H): ICD-10-CM

## 2023-09-29 LAB
ANION GAP SERPL CALCULATED.3IONS-SCNC: 11 MMOL/L (ref 7–15)
APPEARANCE FLD: ABNORMAL
BUN SERPL-MCNC: 10.5 MG/DL (ref 6–20)
CALCIUM SERPL-MCNC: 9.8 MG/DL (ref 8.6–10)
CELL COUNT BODY FLUID SOURCE: ABNORMAL
CHLORIDE SERPL-SCNC: 103 MMOL/L (ref 98–107)
COLOR FLD: YELLOW
CREAT SERPL-MCNC: 0.83 MG/DL (ref 0.67–1.17)
CRYSTALS SNV MICRO: NORMAL
DEPRECATED HCO3 PLAS-SCNC: 24 MMOL/L (ref 22–29)
EGFRCR SERPLBLD CKD-EPI 2021: >90 ML/MIN/1.73M2
GLUCOSE SERPL-MCNC: 121 MG/DL (ref 70–99)
GRAM STAIN RESULT: NORMAL
GRAM STAIN RESULT: NORMAL
HBA1C MFR BLD: 8 % (ref 0–5.6)
LYMPHOCYTES NFR FLD MANUAL: 18 %
MONOS+MACROS NFR FLD MANUAL: 1 %
NEUTS BAND NFR FLD MANUAL: 82 %
POTASSIUM SERPL-SCNC: 4.3 MMOL/L (ref 3.4–5.3)
SODIUM SERPL-SCNC: 138 MMOL/L (ref 135–145)
URATE SERPL-MCNC: 4.6 MG/DL (ref 3.4–7)
WBC # FLD AUTO: 6431 /UL

## 2023-09-29 PROCEDURE — 87070 CULTURE OTHR SPECIMN AEROBIC: CPT | Performed by: EMERGENCY MEDICINE

## 2023-09-29 PROCEDURE — 89060 EXAM SYNOVIAL FLUID CRYSTALS: CPT | Performed by: EMERGENCY MEDICINE

## 2023-09-29 PROCEDURE — 80048 BASIC METABOLIC PNL TOTAL CA: CPT | Performed by: EMERGENCY MEDICINE

## 2023-09-29 PROCEDURE — 83036 HEMOGLOBIN GLYCOSYLATED A1C: CPT | Performed by: EMERGENCY MEDICINE

## 2023-09-29 PROCEDURE — 87205 SMEAR GRAM STAIN: CPT | Performed by: EMERGENCY MEDICINE

## 2023-09-29 PROCEDURE — 36415 COLL VENOUS BLD VENIPUNCTURE: CPT | Performed by: EMERGENCY MEDICINE

## 2023-09-29 PROCEDURE — 99213 OFFICE O/P EST LOW 20 MIN: CPT | Mod: 25 | Performed by: EMERGENCY MEDICINE

## 2023-09-29 PROCEDURE — 20610 DRAIN/INJ JOINT/BURSA W/O US: CPT | Performed by: EMERGENCY MEDICINE

## 2023-09-29 PROCEDURE — 84550 ASSAY OF BLOOD/URIC ACID: CPT | Performed by: EMERGENCY MEDICINE

## 2023-09-29 PROCEDURE — 89051 BODY FLUID CELL COUNT: CPT | Performed by: EMERGENCY MEDICINE

## 2023-09-29 RX ORDER — COLCHICINE 0.6 MG/1
0.6 TABLET ORAL 2 TIMES DAILY
Qty: 14 TABLET | Refills: 0 | Status: SHIPPED | OUTPATIENT
Start: 2023-09-29 | End: 2023-10-06
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
## 2023-09-29 NOTE — LETTER
September 29, 2023      Cora Senior  [address]   [place] [address]        To Whom It May Concern:    Cora Senior  was seen today.  Please excuse him  until 10/3/23 due to an illness.        Sincerely,        HERIBERTO VALENCIA MD

## 2023-09-29 NOTE — RESULT ENCOUNTER NOTE
Hello -    Here are my comments about the recent results. The gout test was negative for crystals so you can stop the colchicine.  You should see your orthopedic doctor for further evaluation about why you have fluid in your knee.    Please let us know if you have any questions or concerns.    Regards,  HERIBERTO VALENCIA MD

## 2023-10-04 LAB — BACTERIA SNV CULT: NO GROWTH

## 2023-10-11 ENCOUNTER — LAB (OUTPATIENT)
Dept: LAB | Facility: CLINIC | Age: 43
End: 2023-10-11
Payer: COMMERCIAL

## 2023-10-11 DIAGNOSIS — N18.2 TYPE 2 DIABETES MELLITUS WITH STAGE 2 CHRONIC KIDNEY DISEASE, WITHOUT LONG-TERM CURRENT USE OF INSULIN (H): Primary | ICD-10-CM

## 2023-10-11 DIAGNOSIS — N18.2 TYPE 2 DIABETES MELLITUS WITH STAGE 2 CHRONIC KIDNEY DISEASE, WITHOUT LONG-TERM CURRENT USE OF INSULIN (H): ICD-10-CM

## 2023-10-11 DIAGNOSIS — E78.5 HYPERLIPIDEMIA LDL GOAL <100: ICD-10-CM

## 2023-10-11 DIAGNOSIS — E11.22 TYPE 2 DIABETES MELLITUS WITH STAGE 2 CHRONIC KIDNEY DISEASE, WITHOUT LONG-TERM CURRENT USE OF INSULIN (H): ICD-10-CM

## 2023-10-11 DIAGNOSIS — E11.22 TYPE 2 DIABETES MELLITUS WITH STAGE 2 CHRONIC KIDNEY DISEASE, WITHOUT LONG-TERM CURRENT USE OF INSULIN (H): Primary | ICD-10-CM

## 2023-10-11 LAB
ANION GAP SERPL CALCULATED.3IONS-SCNC: 11 MMOL/L (ref 7–15)
BUN SERPL-MCNC: 9.3 MG/DL (ref 6–20)
CALCIUM SERPL-MCNC: 9.8 MG/DL (ref 8.6–10)
CHLORIDE SERPL-SCNC: 100 MMOL/L (ref 98–107)
CHOLEST SERPL-MCNC: 142 MG/DL
CREAT SERPL-MCNC: 0.87 MG/DL (ref 0.67–1.17)
DEPRECATED HCO3 PLAS-SCNC: 28 MMOL/L (ref 22–29)
EGFRCR SERPLBLD CKD-EPI 2021: >90 ML/MIN/1.73M2
GLUCOSE SERPL-MCNC: 122 MG/DL (ref 70–99)
HDLC SERPL-MCNC: 37 MG/DL
LDLC SERPL CALC-MCNC: 50 MG/DL
NONHDLC SERPL-MCNC: 105 MG/DL
POTASSIUM SERPL-SCNC: 4.7 MMOL/L (ref 3.4–5.3)
SODIUM SERPL-SCNC: 139 MMOL/L (ref 135–145)
TRIGL SERPL-MCNC: 275 MG/DL

## 2023-10-11 PROCEDURE — 80061 LIPID PANEL: CPT

## 2023-10-11 PROCEDURE — 80048 BASIC METABOLIC PNL TOTAL CA: CPT

## 2023-10-11 PROCEDURE — 36415 COLL VENOUS BLD VENIPUNCTURE: CPT

## 2023-10-13 ENCOUNTER — TELEPHONE (OUTPATIENT)
Dept: FAMILY MEDICINE | Facility: CLINIC | Age: 43
End: 2023-10-13
Payer: COMMERCIAL

## 2023-10-13 NOTE — TELEPHONE ENCOUNTER
----- Message from Hanny Vela MD sent at 10/12/2023  8:21 PM CDT -----  Team - please call patient with results. Let him know his cholesterol is very markedly improved from the last time we checked it. That is terrific. He could decrease his atorvastatin from 20mg daily to 10mg.

## 2023-10-18 ENCOUNTER — ANCILLARY PROCEDURE (OUTPATIENT)
Dept: GENERAL RADIOLOGY | Facility: CLINIC | Age: 43
End: 2023-10-18
Attending: ORTHOPAEDIC SURGERY
Payer: COMMERCIAL

## 2023-10-18 ENCOUNTER — OFFICE VISIT (OUTPATIENT)
Dept: ORTHOPEDICS | Facility: CLINIC | Age: 43
End: 2023-10-18

## 2023-10-18 DIAGNOSIS — M25.562 ACUTE PAIN OF LEFT KNEE: Primary | ICD-10-CM

## 2023-10-18 DIAGNOSIS — Z96.642 S/P TOTAL LEFT HIP ARTHROPLASTY: ICD-10-CM

## 2023-10-18 PROCEDURE — 99213 OFFICE O/P EST LOW 20 MIN: CPT | Performed by: ORTHOPAEDIC SURGERY

## 2023-10-18 PROCEDURE — 73562 X-RAY EXAM OF KNEE 3: CPT | Mod: LT | Performed by: RADIOLOGY

## 2023-10-18 NOTE — NURSING NOTE
Reason For Visit:   Chief Complaint   Patient presents with    RECHECK     Discuss left knee pain and swelling, pt reports no recent falls or traumas // went to walk-in clinic and had aspiration        There were no vitals taken for this visit.    Pain Assessment  Patient Currently in Pain: Yes  0-10 Pain Scale: 5  Primary Pain Location: Knee (left)      Remi Watson, ATC

## 2023-10-18 NOTE — PROGRESS NOTES
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  He is happy with the hip but has noticed some left knee pain and swelling.  He had the knee tapped and was told that there were no crystals there.  He has been taking medicine for high uric acid levels.  He feels that the knee is still swollen although today is a bit better than most.    On examination he is alert oriented has a normal mood and affect and is in no acute distress.  Respirations are regular and unlabored eyes are nonicteric.  He walks with a Trendelenburg type gait favoring that left side but without an assistive device.  The patient has no anterior posterior drawer laxity or varus or valgus instability.  He has no major joint line tenderness but he is mildly tender at the suprapatellar pouch laterally.    X-rays show no joint space narrowing or osteophytes or bony lesions.    This patient may have some low-grade gout that is affecting his knee.  There is been no sign of obvious bacterial infection but his fluid from the knee that was drawn few weeks ago was not grossly normal either.  Would like to have him seen in couple weeks by our sports clinic to retap his knee if it still bothersome and we can check all these labs again such as crystals cell count and cultures.  He will continue to use his compressive sleeve and over-the-counter medications for the discomfort in the meantime.  If he has a significant change in swelling or pain he will return to see us immediately.

## 2023-10-18 NOTE — LETTER
10/18/2023         RE: Cora Senior  [address]  [place] [address]        Dear Colleague,    Thank you for referring your patient, Cora Senior, to the [facility] [place]. Please see a copy of my visit note below.    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  He is happy with the hip but has noticed some left knee pain and swelling.  He had the knee tapped and was told that there were no crystals there.  He has been taking medicine for high uric acid levels.  He feels that the knee is still swollen although today is a bit better than most.    On examination he is alert oriented has a normal mood and affect and is in no acute distress.  Respirations are regular and unlabored eyes are nonicteric.  He walks with a Trendelenburg type gait favoring that left side but without an assistive device.  The patient has no anterior posterior drawer laxity or varus or valgus instability.  He has no major joint line tenderness but he is mildly tender at the suprapatellar pouch laterally.    X-rays show no joint space narrowing or osteophytes or bony lesions.    This patient may have some low-grade gout that is affecting his knee.  There is been no sign of obvious bacterial infection but his fluid from the knee that was drawn few weeks ago was not grossly normal either.  Would like to have him seen in couple weeks by our sports clinic to retap his knee if it still bothersome and we can check all these labs again such as crystals cell count and cultures.  He will continue to use his compressive sleeve and over-the-counter medications for the discomfort in the meantime.  If he has a significant change in swelling or pain he will return to see us immediately.          Noe Salmon MD

## 2023-11-03 ENCOUNTER — OFFICE VISIT (OUTPATIENT)
Dept: ORTHOPEDICS | Facility: CLINIC | Age: 43
End: 2023-11-03
Payer: COMMERCIAL

## 2023-11-03 DIAGNOSIS — M25.462 EFFUSION OF LEFT KNEE JOINT: Primary | ICD-10-CM

## 2023-11-03 LAB
APPEARANCE FLD: ABNORMAL
CELL COUNT BODY FLUID SOURCE: ABNORMAL
COLOR FLD: YELLOW
CRYSTALS SNV MICRO: NORMAL
GRAM STAIN RESULT: NORMAL
GRAM STAIN RESULT: NORMAL
LYMPHOCYTES NFR FLD MANUAL: 10 %
MONOS+MACROS NFR FLD MANUAL: 16 %
NEUTS BAND NFR FLD MANUAL: 74 %
WBC # FLD AUTO: ABNORMAL /UL

## 2023-11-03 PROCEDURE — 99000 SPECIMEN HANDLING OFFICE-LAB: CPT | Performed by: PATHOLOGY

## 2023-11-03 PROCEDURE — 87476 LYME DIS DNA AMP PROBE: CPT | Mod: 90 | Performed by: PATHOLOGY

## 2023-11-03 PROCEDURE — 89060 EXAM SYNOVIAL FLUID CRYSTALS: CPT | Performed by: FAMILY MEDICINE

## 2023-11-03 PROCEDURE — 87205 SMEAR GRAM STAIN: CPT | Performed by: FAMILY MEDICINE

## 2023-11-03 PROCEDURE — 99207 PR DROP WITH A PROCEDURE: CPT | Performed by: FAMILY MEDICINE

## 2023-11-03 PROCEDURE — 20611 DRAIN/INJ JOINT/BURSA W/US: CPT | Mod: LT | Performed by: FAMILY MEDICINE

## 2023-11-03 PROCEDURE — 87070 CULTURE OTHR SPECIMN AEROBIC: CPT | Performed by: FAMILY MEDICINE

## 2023-11-03 PROCEDURE — 87075 CULTR BACTERIA EXCEPT BLOOD: CPT | Performed by: FAMILY MEDICINE

## 2023-11-03 PROCEDURE — 89051 BODY FLUID CELL COUNT: CPT | Performed by: FAMILY MEDICINE

## 2023-11-03 RX ADMIN — LIDOCAINE HYDROCHLORIDE 3 ML: 10 INJECTION, SOLUTION EPIDURAL; INFILTRATION; INTRACAUDAL; PERINEURAL at 16:54

## 2023-11-03 NOTE — PROGRESS NOTES
PROCEDURE ENCOUNTER    [facility]  Orthopedics  Arthur Barcenas DO  November 3, 2023     Name: Cora Senior  MRN: [id number]  Age: 43 year old  : 1980    Referring provider: Dr. Noe Salmon  Diagnosis: Effusion left knee    Brief Hx: Recurrent effusion of left knee despite treatment for gout.    Knee Aspiration - Ultrasound Guided  The patient was informed of the risks and the benefits of the procedure and a written consent was signed.  The patient s left knee was prepped with chlorhexidine in sterile fashion.   Procedure was performed using sterile technique.  Local anesthesia was performed using a 27-gauge 1.5-inch needle to administer 3 mL of 1% lidocaine without epi.    Under ultrasound guidance a 1.5-inch 18-gauge needle on a 30 cc syringe was used to enter the superolateral aspect of the knee.  Needle placement was visualized and documented with ultrasound.  Ultrasound visualization was necessary due to ensure proper placement of needle in to the effusion, as well as resolution of effusion once aspiration was completed.  15 cc of yellow, cloudy joint fluid was aspirated.  Aspiration was performed successfully, without difficulty.  Images were permanently stored for the patient's record.  There were no complications. The patient tolerated the procedure well. There was negligible bleeding.   The patient was instructed to ice the knee upon leaving clinic and refrain from overuse over the next 3 days.   The patient was instructed to call or go to the emergency room with any unusual pain, swelling, redness, or if otherwise concerned.   Aspirate was sent to labs in appropriate tubes.  Will forward results to Dr. Noe Salmon.  Labs included crystals, aerobic and anaerobic cultures, cell count with differential, Gram stain, Lyme, and crystal analysis.  Large Joint Injection/Arthocentesis: L knee joint    Date/Time: 11/3/2023 4:54 PM    Performed by: Arthur Barcenas DO  Authorized by: Arthur Barcenas  DO    Indications:  Pain and osteoarthritis  Needle Size:  18 G  Guidance: ultrasound    Approach:  Anterolateral  Location:  Knee      Medications:  3 mL lidocaine (PF) 1 %  Aspirate amount (mL):  15  Aspirate:  Cloudy and yellow  Outcome:  Tolerated well, no immediate complications  Procedure discussed: discussed risks, benefits, and alternatives    Consent Given by:  Patient  Timeout: timeout called immediately prior to procedure    Prep: patient was prepped and draped in usual sterile fashion       Dylan Boogie M.A., LAT, ATC  Certified Athletic Trainer

## 2023-11-03 NOTE — LETTER
11/3/2023      RE: Cora Senior  [address]  [place] [address]     Dear Colleague,    Thank you for referring your patient, Cora Senior, to the [facility] [place]. Please see a copy of my visit note below.    PROCEDURE ENCOUNTER    [facility]  Orthopedics  Arthur Barcenas, DO  November 3, 2023     Name: Cora Senior  MRN: [id number]  Age: 43 year old  : 1980    Referring provider: Dr. Noe Salmon  Diagnosis: Effusion left knee    Brief Hx: Recurrent effusion of left knee despite treatment for gout.    Knee Aspiration - Ultrasound Guided  The patient was informed of the risks and the benefits of the procedure and a written consent was signed.  The patient s left knee was prepped with chlorhexidine in sterile fashion.   Procedure was performed using sterile technique.  Local anesthesia was performed using a 27-gauge 1.5-inch needle to administer 3 mL of 1% lidocaine without epi.    Under ultrasound guidance a 1.5-inch 18-gauge needle on a 30 cc syringe was used to enter the superolateral aspect of the knee.  Needle placement was visualized and documented with ultrasound.  Ultrasound visualization was necessary due to ensure proper placement of needle in to the effusion, as well as resolution of effusion once aspiration was completed.  15 cc of yellow, cloudy joint fluid was aspirated.  Aspiration was performed successfully, without difficulty.  Images were permanently stored for the patient's record.  There were no complications. The patient tolerated the procedure well. There was negligible bleeding.   The patient was instructed to ice the knee upon leaving clinic and refrain from overuse over the next 3 days.   The patient was instructed to call or go to the emergency room with any unusual pain, swelling, redness, or if otherwise concerned.   Aspirate was sent to labs in appropriate tubes.  Will forward results to Dr. Noe Salmon.  Labs  included crystals, aerobic and anaerobic cultures, cell count with differential, Gram stain, Lyme, and crystal analysis.  Large Joint Injection/Arthocentesis: L knee joint    Date/Time: 11/3/2023 4:54 PM    Performed by: Arthur Barcenas DO  Authorized by: Arthur Barcenas DO    Indications:  Pain and osteoarthritis  Needle Size:  18 G  Guidance: ultrasound    Approach:  Anterolateral  Location:  Knee      Medications:  3 mL lidocaine (PF) 1 %  Aspirate amount (mL):  15  Aspirate:  Cloudy and yellow  Outcome:  Tolerated well, no immediate complications  Procedure discussed: discussed risks, benefits, and alternatives    Consent Given by:  Patient  Timeout: timeout called immediately prior to procedure    Prep: patient was prepped and draped in usual sterile fashion       Dylan Boogie M.A., LAT, ATC  Certified Athletic Trainer            Again, thank you for allowing me to participate in the care of your patient.      Sincerely,    Arthur Barcenas DO

## 2023-11-03 NOTE — NURSING NOTE
[address] [address]  Dept: [phone]  ______________________________________________________________________________    Patient: Cora Sneior   : 1980   MRN: [id number]   November 3, 2023    INVASIVE PROCEDURE SAFETY CHECKLIST    Date: 11/3/23   Procedure: Left knee IA USG Aspiration  Patient Name: Cora Senior  MRN: [id number]  YOB: 1980    Action: Complete sections as appropriate. Any discrepancy results in a HARD COPY until resolved.     PRE PROCEDURE:  Patient ID verified with 2 identifiers (name and  or MRN): Yes  Procedure and site verified with patient/designee (when able): Yes  Accurate consent documentation in medical record: Yes  H&P (or appropriate assessment) documented in medical record: Yes  H&P must be up to 20 days prior to procedure and updates within 24 hours of procedure as applicable: NA  Relevant diagnostic and radiology test results appropriately labeled and displayed as applicable: Yes  Procedure site(s) marked with provider initials: NA    TIMEOUT:  Time-Out performed immediately prior to starting procedure, including verbal and active participation of all team members addressing the following:Yes  * Correct patient identify  * Confirmed that the correct side and site are marked  * An accurate procedure consent form  * Agreement on the procedure to be done  * Correct patient position  * Relevant images and results are properly labeled and appropriately displayed  * The need to administer antibiotics or fluids for irrigation purposes during the procedure as applicable   * Safety precautions based on patient history or medication use    DURING PROCEDURE: Verification of correct person, site, and procedures any time the responsibility for care of the patient is transferred to another member of the care team.       Prior to injection, verified patient identity using patient's name and date of  birth.  Due to injection administration, patient instructed to remain in clinic for 15 minutes  afterwards, and to report any adverse reaction to me immediately.    Joint aspiration was performed        Dylan Boogie ATC  November 3, 2023

## 2023-11-04 RX ORDER — LIDOCAINE HYDROCHLORIDE 10 MG/ML
3 INJECTION, SOLUTION EPIDURAL; INFILTRATION; INTRACAUDAL; PERINEURAL
Status: SHIPPED | OUTPATIENT
Start: 2023-11-03

## 2023-11-07 LAB — B BURGDOR DNA SPEC QL NAA+PROBE: NOT DETECTED

## 2023-11-08 LAB — BACTERIA SNV CULT: NO GROWTH

## 2023-11-14 DIAGNOSIS — E78.5 HYPERLIPIDEMIA LDL GOAL <100: ICD-10-CM

## 2023-11-14 RX ORDER — ATORVASTATIN CALCIUM 20 MG/1
20 TABLET, FILM COATED ORAL DAILY
Qty: 90 TABLET | Refills: 2 | Status: SHIPPED | OUTPATIENT
Start: 2023-11-14 | End: 2024-03-05

## 2023-11-15 ENCOUNTER — OFFICE VISIT (OUTPATIENT)
Dept: ORTHOPEDICS | Facility: CLINIC | Age: 43
End: 2023-11-15
Payer: COMMERCIAL

## 2023-11-15 DIAGNOSIS — M25.562 ACUTE PAIN OF LEFT KNEE: Primary | ICD-10-CM

## 2023-11-15 DIAGNOSIS — M25.462 EFFUSION, LEFT KNEE: ICD-10-CM

## 2023-11-15 PROCEDURE — 99213 OFFICE O/P EST LOW 20 MIN: CPT | Mod: GC | Performed by: ORTHOPAEDIC SURGERY

## 2023-11-15 NOTE — NURSING NOTE
Chief Complaint   Patient presents with    RECHECK     Patient returns 1 month since last visit.  Was then seen by Dr. Barcenas and had labs drawn.  He is here to review labs per advice from Dr. Barcenas.       43 year old  1980    Primary MD: Hanny Vela  Ref. MD: gene    There were no vitals taken for this visit.      Pain Assessment  Patient Currently in Pain: Yes  0-10 Pain Scale: 7  Primary Pain Location: Knee (left)  Other Pain Locations: left shoulder     [id number] - [place], [place] - [address] [id number] - [place], [place] - [address]        No Known Allergies        Current Outpatient Medications   Medication    acetaminophen (TYLENOL) 325 MG tablet    allopurinol (ZYLOPRIM) 100 MG tablet    allopurinol (ZYLOPRIM) 300 MG tablet    aspirin 81 MG EC tablet    atorvastatin (LIPITOR) 20 MG tablet    cefadroxil (DURICEF) 500 MG capsule    ferrous sulfate (FEROSUL) 325 (65 Fe) MG tablet    indomethacin (INDOCIN) 50 MG capsule    lisinopril (ZESTRIL) 2.5 MG tablet    metFORMIN (GLUCOPHAGE XR) 500 MG 24 hr tablet    multivitamin, therapeutic (THERA-VIT) TABS tablet    vitamin D2 (ERGOCALCIFEROL) 69102 units (1250 mcg) capsule     Current Facility-Administered Medications   Medication    lidocaine (PF) (XYLOCAINE) 1 % injection 3 mL       Promis 10 Assessment        11/12/2023    11:02 AM   PROMIS 10   In general, would you say your health is: Fair   In general, would you say your quality of life is: Fair   In general, how would you rate your physical health? Fair   In general, how would you rate your mental health, including your mood and your ability to think? Fair   In general, how would you rate your satisfaction with your social activities and relationships? Fair   In general, please rate how well you carry out your usual social activities and roles Poor   To what extent are you able to carry out your everyday physical activities such  as walking, climbing stairs, carrying groceries, or moving a chair? A little   In the past 7 days, how often have you been bothered by emotional problems such as feeling anxious, depressed, or irritable? Often   In the past 7 days, how would you rate your fatigue on average? Severe   In the past 7 days, how would you rate your pain on average, where 0 means no pain, and 10 means worst imaginable pain? 7   In general, would you say your health is: 2   In general, would you say your quality of life is: 2   In general, how would you rate your physical health? 2   In general, how would you rate your mental health, including your mood and your ability to think? 2   In general, how would you rate your satisfaction with your social activities and relationships? 2   In general, please rate how well you carry out your usual social activities and roles. (This includes activities at home, at work and in your community, and responsibilities as a parent, child, spouse, employee, friend, etc.) 1   To what extent are you able to carry out your everyday physical activities such as walking, climbing stairs, carrying groceries, or moving a chair? 2   In the past 7 days, how often have you been bothered by emotional problems such as feeling anxious, depressed, or irritable? 4   In the past 7 days, how would you rate your fatigue on average? 4   In the past 7 days, how would you rate your pain on average, where 0 means no pain, and 10 means worst imaginable pain? 7   Global Mental Health Score 8   Global Physical Health Score 8   PROMIS TOTAL - SUBSCORES 16              Or[facility] Knee Questionnaire         No data to display

## 2023-11-15 NOTE — PROGRESS NOTES
Orthopedic Clinic Note    Follow-up visit for 43 year old with left knee pain and swelling of unknown origin. This occurred approximately 2 months after his left IVONNE with Dr. Orozco in June of this year.  He had a repeat aspiration with Dr. Barcenas on 11/3.  Results of this demonstrated 48216 WBC but negative cultures and no crystals. He had some relief of the pain and swelling for a few days after the aspiration but this recurred and he is back at his baseline level of left knee pain. He is also now having left shoulder pain that started without incident approximately 1 week ago.     On exam:  Left knee: ROM 5-110 with pain at end range. Palpable moderate effusion of the left knee. Stable to varus/valgus stress at 5 and 30 degrees. Negative Lachman. No palpable crepitus or grinding.  Left shoulder: Pain with FF or Abduction beyond 90 degrees. Pain with empty can, belly press and hornblowers, no pain with resisted external rotation. No weakness noted on exam only pain. Minimal discomfort with PROM of the shoulder.    No new imaging obtained today.    43 year old male with recurrent left knee pain and effusion of unknown origin, and left shoulder pain with possible rotator cuff pathology.    - PT referral for left shoulder.  - MRI left knee. Will call patient with results. Depending on findings may need sports medicine evaluation versus further laboratory evaluation for possible etiology.     Seen and discussed with Dr. Viky Mtz MD  Orthopaedic Surgery PGY-4

## 2023-11-15 NOTE — LETTER
11/15/2023         RE: Cora Senior  [address]  [place] [address]        Dear Colleague,    Thank you for referring your patient, Cora Senior, to the [facility] [place]. Please see a copy of my visit note below.    I was present with the resident during the history and exam.  I discussed the case with the resident and agree with the findings as documented in the assessment and plan.    Orthopedic Clinic Note    Follow-up visit for 43 year old with left knee pain and swelling of unknown origin. This occurred approximately 2 months after his left IVONNE with Dr. Orozco in June of this year.  He had a repeat aspiration with Dr. Barcenas on 11/3.  Results of this demonstrated 19374 WBC but negative cultures and no crystals. He had some relief of the pain and swelling for a few days after the aspiration but this recurred and he is back at his baseline level of left knee pain. He is also now having left shoulder pain that started without incident approximately 1 week ago.     On exam:  Left knee: ROM 5-110 with pain at end range. Palpable moderate effusion of the left knee. Stable to varus/valgus stress at 5 and 30 degrees. Negative Lachman. No palpable crepitus or grinding.  Left shoulder: Pain with FF or Abduction beyond 90 degrees. Pain with empty can, belly press and hornblowers, no pain with resisted external rotation. No weakness noted on exam only pain. Minimal discomfort with PROM of the shoulder.    No new imaging obtained today.    43 year old male with recurrent left knee pain and effusion of unknown origin, and left shoulder pain with possible rotator cuff pathology.    - PT referral for left shoulder.  - MRI left knee. Will call patient with results. Depending on findings may need sports medicine evaluation versus further laboratory evaluation for possible etiology.     Seen and discussed with Dr. Viky Mtz MD  Orthopaedic Surgery  PGY-4        Noe Salmon MD

## 2023-11-16 ENCOUNTER — HOSPITAL ENCOUNTER (OUTPATIENT)
Dept: MRI IMAGING | Facility: HOSPITAL | Age: 43
Discharge: HOME | End: 2023-11-16
Attending: ORTHOPAEDIC SURGERY | Admitting: ORTHOPAEDIC SURGERY
Payer: COMMERCIAL

## 2023-11-16 ENCOUNTER — TELEPHONE (OUTPATIENT)
Dept: ORTHOPEDICS | Facility: CLINIC | Age: 43
End: 2023-11-16
Payer: COMMERCIAL

## 2023-11-16 DIAGNOSIS — M25.562 ACUTE PAIN OF LEFT KNEE: ICD-10-CM

## 2023-11-16 DIAGNOSIS — M25.462 EFFUSION, LEFT KNEE: ICD-10-CM

## 2023-11-16 PROCEDURE — 255N000002 HC RX 255 OP 636: Mod: JZ | Performed by: ORTHOPAEDIC SURGERY

## 2023-11-16 PROCEDURE — 73723 MRI JOINT LWR EXTR W/O&W/DYE: CPT | Mod: LT

## 2023-11-16 PROCEDURE — A9585 GADOBUTROL INJECTION: HCPCS | Mod: JZ | Performed by: ORTHOPAEDIC SURGERY

## 2023-11-16 RX ORDER — GADOBUTROL 604.72 MG/ML
0.1 INJECTION INTRAVENOUS ONCE
Status: COMPLETED | OUTPATIENT
Start: 2023-11-16 | End: 2023-11-16

## 2023-11-16 RX ADMIN — GADOBUTROL 9 ML: 604.72 INJECTION INTRAVENOUS at 13:21

## 2023-11-16 NOTE — TELEPHONE ENCOUNTER
[facility] Call Center    Phone Message    May a detailed message be left on voicemail: yes     Reason for Call: Pj with [facility] needs clarification on an MRI order, would like a call back asap patient has a 1:00pm appt today, please call [phone] [employer]     Action Taken: Other: [facility]    Travel Screening: Not Applicable

## 2023-11-17 ENCOUNTER — VIRTUAL VISIT (OUTPATIENT)
Dept: ORTHOPEDICS | Facility: CLINIC | Age: 43
End: 2023-11-17
Payer: COMMERCIAL

## 2023-11-17 DIAGNOSIS — S83.232A COMPLEX TEAR OF MEDIAL MENISCUS OF LEFT KNEE AS CURRENT INJURY, INITIAL ENCOUNTER: Primary | ICD-10-CM

## 2023-11-17 LAB — BACTERIA SNV CULT: NORMAL

## 2023-11-17 PROCEDURE — 99441 PR PHYSICIAN TELEPHONE EVALUATION 5-10 MIN: CPT | Mod: 95 | Performed by: ORTHOPAEDIC SURGERY

## 2023-11-17 NOTE — LETTER
11/17/2023       RE: Cora Senior  [address]  [place] [address]    Dear Colleague,    Thank you for referring your patient, Cora Senior, to the [facility] [place]. Please see a copy of my visit note below.    Cora is a 43 year old who is being evaluated via a billable telephone visit.      What phone number would you like to be contacted at? [phone]  How would you like to obtain your AVS? 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  Since then he has noticed a lot of left knee pain.  He presents now to discuss his recent MRI.    I reviewed the MRI and the patient has a significant medial meniscus tear.  He has some subtle changes in the lateral meniscus.    I am not sure if this is repairable or if it would be best managed by debridement.  He does not have much in the way of secondary arthritic changes so we will have him seen by our sports physicians.  I am hopeful they will be able to offer the patient some type of arthroscopic procedure to address the meniscus and the patient's knee pain.  The patient is in agreement with this plan and we will reach out to him to set these things up soon.  I have answered all his questions today.          Noe Salmon MD

## 2023-11-17 NOTE — PROGRESS NOTES
Thet is a 43 year old who is being evaluated via a billable telephone visit.      What phone number would you like to be contacted at? [phone]  How would you like to obtain your AVS? 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  Since then he has noticed a lot of left knee pain.  He presents now to discuss his recent MRI.    I reviewed the MRI and the patient has a significant medial meniscus tear.  He has some subtle changes in the lateral meniscus.    I am not sure if this is repairable or if it would be best managed by debridement.  He does not have much in the way of secondary arthritic changes so we will have him seen by our sports physicians.  I am hopeful they will be able to offer the patient some type of arthroscopic procedure to address the meniscus and the patient's knee pain.  The patient is in agreement with this plan and we will reach out to him to set these things up soon.  I have answered all his questions today.

## 2023-11-27 ENCOUNTER — OFFICE VISIT (OUTPATIENT)
Dept: ORTHOPEDICS | Facility: CLINIC | Age: 43
End: 2023-11-27
Payer: COMMERCIAL

## 2023-11-27 VITALS — BODY MASS INDEX: 30.96 KG/M2 | HEIGHT: 69 IN | WEIGHT: 209 LBS

## 2023-11-27 DIAGNOSIS — M25.562 LEFT KNEE PAIN, UNSPECIFIED CHRONICITY: Primary | ICD-10-CM

## 2023-11-27 PROCEDURE — 20610 DRAIN/INJ JOINT/BURSA W/O US: CPT | Mod: LT | Performed by: ORTHOPAEDIC SURGERY

## 2023-11-27 RX ADMIN — TRIAMCINOLONE ACETONIDE 40 MG: 40 INJECTION, SUSPENSION INTRA-ARTICULAR; INTRAMUSCULAR at 08:14

## 2023-11-27 RX ADMIN — LIDOCAINE HYDROCHLORIDE 8 ML: 5 INJECTION, SOLUTION INFILTRATION; INTRAVENOUS at 08:14
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
## 2023-11-27 NOTE — PROGRESS NOTES
Patient seen and examined with the fellow. I also personally reviewed the images and interpreted the imaging myself.     Assesment: Left knee recurrent effusions, unclear contribution of changes within the meniscus    Plan: I had a long discussion with the patient.  Reviewed the diagnosis potential treatment options.  At this time I told him that its not definitively clear if the changes that we see in his meniscus or explaining his underlying symptoms.  In light of this information I would like to try corticosteroid injection.  If he sees good relief then no further intervention be necessary.  He fails to see lasting improvement I would consider arthroscopic evaluation and meniscectomy.    After written informed consent obtained and signed, after sufficient prepping and sterile technique, 40 mg of kenalog and , 8 cc of 1% lidocaine were injected without complication into the left knee. The patient tolerated the injection well and a sterile dressing was applied.     I agree with history, physical and imaging as well as the assessment and plan as detailed by Dr. Mccarthy.         Large Joint Injection: L knee joint    Date/Time: 11/27/2023 8:14 AM    Performed by: Sam Ruiz MD  Authorized by: Sam Ruiz MD    Indications:  Pain and osteoarthritis  Needle Size:  21 G  Guidance: landmark guided    Approach:  Anterolateral  Location:  Knee      Medications:  40 mg triamcinolone 40 MG/ML; 8 mL lidocaine (PF) 0.5 %  Outcome:  Tolerated well, no immediate complications  Procedure discussed: discussed risks, benefits, and alternatives    Consent Given by:  Patient  Timeout: timeout called immediately prior to procedure    Prep: patient was prepped and draped in usual sterile fashion

## 2023-11-27 NOTE — NURSING NOTE
[address] [address]  Dept: [phone]  ______________________________________________________________________________    Patient: Cora Senior   : 1980   MRN: [id number]   2023    INVASIVE PROCEDURE SAFETY CHECKLIST    Date: 2023   Procedure: left knee joint injection with kenalog  Patient Name: Cora Senior  MRN: [id number]  YOB: 1980    Action: Complete sections as appropriate. Any discrepancy results in a HARD COPY until resolved.     PRE PROCEDURE:  Patient ID verified with 2 identifiers (name and  or MRN): Yes  Procedure and site verified with patient/designee (when able): Yes  Accurate consent documentation in medical record: Yes  H&P (or appropriate assessment) documented in medical record: Yes  H&P must be up to 20 days prior to procedure and updates within 24 hours of procedure as applicable: NA  Relevant diagnostic and radiology test results appropriately labeled and displayed as applicable: NA  Procedure site(s) marked with provider initials: NA    TIMEOUT:  Time-Out performed immediately prior to starting procedure, including verbal and active participation of all team members addressing the following:Yes  * Correct patient identify  * Confirmed that the correct side and site are marked  * An accurate procedure consent form  * Agreement on the procedure to be done  * Correct patient position  * Relevant images and results are properly labeled and appropriately displayed  * The need to administer antibiotics or fluids for irrigation purposes during the procedure as applicable   * Safety precautions based on patient history or medication use    DURING PROCEDURE: Verification of correct person, site, and procedures any time the responsibility for care of the patient is transferred to another member of the care team.       Prior to injection, verified patient identity using patient's name  and date of birth.  Due to injection administration, patient instructed to remain in clinic for 15 minutes  afterwards, and to report any adverse reaction to me immediately.    Joint injection was performed.      Lido   Drug Amount Wasted:  Yes: 42 mg/ml   Vial/Syringe: Single dose vial  Expiration Date:  10/01/2024    Kenalog   Drug Amount Wasted: None  Vial/Syringe: Single dose vial  Expiration Date: 07/01/2025    Joselyn Soriano, ATC  November 27, 2023

## 2023-11-29 RX ORDER — TRIAMCINOLONE ACETONIDE 40 MG/ML
40 INJECTION, SUSPENSION INTRA-ARTICULAR; INTRAMUSCULAR
Status: SHIPPED | OUTPATIENT
Start: 2023-11-27

## 2023-11-29 RX ORDER — LIDOCAINE HYDROCHLORIDE 5 MG/ML
8 INJECTION, SOLUTION INFILTRATION; INTRAVENOUS
Status: SHIPPED | OUTPATIENT
Start: 2023-11-27

## 2023-12-05 ENCOUNTER — THERAPY VISIT (OUTPATIENT)
Dept: PHYSICAL THERAPY | Facility: REHABILITATION | Age: 43
End: 2023-12-05
Payer: COMMERCIAL

## 2023-12-05 DIAGNOSIS — M25.512 ACUTE PAIN OF LEFT SHOULDER: ICD-10-CM

## 2023-12-05 DIAGNOSIS — M25.562 ACUTE PAIN OF LEFT KNEE: Primary | ICD-10-CM

## 2023-12-05 DIAGNOSIS — Z47.89 ORTHOPEDIC AFTERCARE: ICD-10-CM

## 2023-12-05 PROCEDURE — 97110 THERAPEUTIC EXERCISES: CPT | Mod: GP | Performed by: PHYSICAL THERAPIST

## 2023-12-05 PROCEDURE — 97161 PT EVAL LOW COMPLEX 20 MIN: CPT | Mod: GP | Performed by: PHYSICAL THERAPIST
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
## 2023-12-31 ENCOUNTER — HEALTH MAINTENANCE LETTER (OUTPATIENT)
Age: 43
End: 2023-12-31

## 2024-01-01 DIAGNOSIS — E11.9 TYPE 2 DIABETES MELLITUS WITHOUT COMPLICATION, WITHOUT LONG-TERM CURRENT USE OF INSULIN (H): ICD-10-CM

## 2024-01-02 RX ORDER — METFORMIN HCL 500 MG
2000 TABLET, EXTENDED RELEASE 24 HR ORAL
Qty: 120 TABLET | Refills: 2 | Status: SHIPPED | OUTPATIENT
Start: 2024-01-02

## 2024-01-05 ENCOUNTER — THERAPY VISIT (OUTPATIENT)
Dept: PHYSICAL THERAPY | Facility: REHABILITATION | Age: 44
End: 2024-01-05
Payer: COMMERCIAL

## 2024-01-05 DIAGNOSIS — M25.562 ACUTE PAIN OF LEFT KNEE: ICD-10-CM

## 2024-01-05 DIAGNOSIS — M25.512 ACUTE PAIN OF LEFT SHOULDER: Primary | ICD-10-CM

## 2024-01-05 PROCEDURE — 97110 THERAPEUTIC EXERCISES: CPT | Mod: GP

## 2024-01-05 NOTE — PROGRESS NOTES
DISCHARGE  Reason for Discharge: Patient has met all goals.      Discharge Plan: Patient to continue home program.    Referring Provider:  Noe Salmon         01/05/24 0500   Appointment Info   Signing clinician's name / credentials Rhys Arroyo, PT, DPT, CSCS   Total/Authorized Visits 8   Visits Used 2   Medical Diagnosis M25.562 (ICD-10-CM) - Acute pain of left knee; L shoulder pain without trauma   PT Tx Diagnosis Acute L knee and L shoulder pain   Precautions/Limitations IVONNE L - no restrictions   Quick Adds Certification   Progress Note/Certification   Start of Care Date 12/05/23   Onset of illness/injury or Date of Surgery 08/01/23   Therapy Frequency 1x/week to every other week   Predicted Duration 8 weeks   Certification date from 12/05/23   Certification date to 01/30/24   Progress Note Due Date 01/30/24   Progress Note Completed Date 12/05/23   GOALS   PT Goals 2;3;4   PT Goal 1   Goal Identifier Self-management/HEP   Goal Description Patient will be independent in self-management of condition and HEP to reach functional goals.   Goal Progress 100%   Target Date 01/30/24   PT Goal 2   Goal Identifier Getting in and out of car   Goal Description Patient will be able to get into and out of his car without pain in the knee in order to return to PLOF.   Goal Progress 100%   Target Date 01/30/24   PT Goal 3   Goal Identifier Driving   Goal Description Patient will be able to drive without pain in the shoulder in order to improve QOL and functional independence.   Goal Progress 100%   Target Date 01/30/24   PT Goal 4   Goal Identifier Jacket   Goal Description Patient will be able to don/doff jacket without pain in the shoulder in order to return to PLOF and perform self-cares.   Goal Progress 100%   Target Date 01/30/24   Subjective Report   Subjective Report Patient reports his knee and shoulder are doing much better and he doesn't have much pain. He's doing more exercising and that seems to  "have helped.   Treatment Interventions (PT)   Interventions Therapeutic Procedure/Exercise   Therapeutic Procedure/Exercise   Therapeutic Procedures: strength, endurance, ROM, flexibillity minutes (98868) 40   PTRx Ther Proc 1 Hip Flexion Straight Leg Raise   PTRx Ther Proc 1 - Details x 10 repetitions L   PTRx Ther Proc 2 Hip Abduction Straight Leg Raise   PTRx Ther Proc 2 - Details x 10 repetitions L   PTRx Ther Proc 3 Bilateral Rotation With Theraband   PTRx Ther Proc 3 - Details x 10 with L3 band   PTRx Ther Proc 4 Shoulder Theraband Internal Rotation   PTRx Ther Proc 4 - Details x 5 with L3 band   PTRx Ther Proc 5 Shoulder Theraband Rows   PTRx Ther Proc 5 - Details x 5 with L3 band   PTRx Ther Proc 6 Scapular Retraction/Depression   PTRx Ther Proc 6 - Details x 6 repetitions   Skilled Intervention Progressive LE/UE strengthening   Patient Response/Progress Patient worked to appropriate level of fatigue   Ther Proc 1 Banded low row 2x12 Blue; Banded shoulder extension 2x12 Blue; Banded bilateral TKE 2x12 Blue; Wall plank down dog 2x12; Exercise ball serratus roll 2x12; Bent over kick back 2x12 each; Anterior step down 4\" 2x8 each (reproduced medial knee pain); Banded S/L TKE 2x12 G; Standing, S/L hip abducion 2x12; Seated shoulder press 2x12 @ 10#; Decline goblet squat 2x12 @ 15#;   Education   Learner/Method Patient;Listening;Demonstration   Plan   Home program PTRx   Plan for next session Progress strengthening as able.  Focus on hip and shoulder.   Comments   Comments Patient reports improvements in shoulder and knee pain. He is no longer limied with any functions. LE and UE strengthening progressed and patient worked to appropriate level of fatigue. At this time patient chooses to discontinue physical therapy due to meeting all goals. Patient was educated on future access to physical therapy.   Total Session Time   Timed Code Treatment Minutes 40   Total Treatment Time (sum of timed and untimed services) 40 "

## 2024-02-29 SDOH — HEALTH STABILITY: PHYSICAL HEALTH: ON AVERAGE, HOW MANY DAYS PER WEEK DO YOU ENGAGE IN MODERATE TO STRENUOUS EXERCISE (LIKE A BRISK WALK)?: 6 DAYS

## 2024-02-29 SDOH — HEALTH STABILITY: PHYSICAL HEALTH: ON AVERAGE, HOW MANY MINUTES DO YOU ENGAGE IN EXERCISE AT THIS LEVEL?: 150+ MIN

## 2024-02-29 ASSESSMENT — SOCIAL DETERMINANTS OF HEALTH (SDOH): HOW OFTEN DO YOU GET TOGETHER WITH FRIENDS OR RELATIVES?: ONCE A WEEK

## 2024-03-04 DIAGNOSIS — E78.5 HYPERLIPIDEMIA LDL GOAL <100: ICD-10-CM

## 2024-03-04 DIAGNOSIS — E11.9 TYPE 2 DIABETES MELLITUS WITHOUT COMPLICATION, WITHOUT LONG-TERM CURRENT USE OF INSULIN (H): Primary | ICD-10-CM

## 2024-03-05 ENCOUNTER — OFFICE VISIT (OUTPATIENT)
Dept: FAMILY MEDICINE | Facility: CLINIC | Age: 44
End: 2024-03-05
Payer: COMMERCIAL

## 2024-03-05 VITALS
RESPIRATION RATE: 16 BRPM | WEIGHT: 207.75 LBS | TEMPERATURE: 97.8 F | HEIGHT: 70 IN | BODY MASS INDEX: 29.74 KG/M2 | DIASTOLIC BLOOD PRESSURE: 76 MMHG | SYSTOLIC BLOOD PRESSURE: 110 MMHG | OXYGEN SATURATION: 98 % | HEART RATE: 100 BPM

## 2024-03-05 DIAGNOSIS — G89.18 ACUTE POST-OPERATIVE PAIN: ICD-10-CM

## 2024-03-05 DIAGNOSIS — M16.12 PRIMARY OSTEOARTHRITIS OF LEFT HIP: Primary | ICD-10-CM

## 2024-03-05 DIAGNOSIS — E78.5 HYPERLIPIDEMIA LDL GOAL <100: ICD-10-CM

## 2024-03-05 DIAGNOSIS — Z00.00 PREVENTATIVE HEALTH CARE: ICD-10-CM

## 2024-03-05 DIAGNOSIS — N18.2 TYPE 2 DIABETES MELLITUS WITH STAGE 2 CHRONIC KIDNEY DISEASE, WITHOUT LONG-TERM CURRENT USE OF INSULIN (H): ICD-10-CM

## 2024-03-05 DIAGNOSIS — E11.9 TYPE 2 DIABETES MELLITUS WITHOUT COMPLICATION, WITHOUT LONG-TERM CURRENT USE OF INSULIN (H): ICD-10-CM

## 2024-03-05 DIAGNOSIS — M1A.0720 CHRONIC GOUT OF LEFT ANKLE, UNSPECIFIED CAUSE: ICD-10-CM

## 2024-03-05 DIAGNOSIS — Z23 NEED FOR VACCINATION: ICD-10-CM

## 2024-03-05 DIAGNOSIS — E11.22 TYPE 2 DIABETES MELLITUS WITH STAGE 2 CHRONIC KIDNEY DISEASE, WITHOUT LONG-TERM CURRENT USE OF INSULIN (H): ICD-10-CM

## 2024-03-05 LAB
HBA1C MFR BLD: 7.4 % (ref 0–5.6)
HOLD SPECIMEN: NORMAL
HOLD SPECIMEN: NORMAL

## 2024-03-05 PROCEDURE — 99214 OFFICE O/P EST MOD 30 MIN: CPT | Mod: 25 | Performed by: FAMILY MEDICINE

## 2024-03-05 PROCEDURE — 91320 SARSCV2 VAC 30MCG TRS-SUC IM: CPT | Performed by: FAMILY MEDICINE

## 2024-03-05 PROCEDURE — 83036 HEMOGLOBIN GLYCOSYLATED A1C: CPT | Performed by: FAMILY MEDICINE

## 2024-03-05 PROCEDURE — 36415 COLL VENOUS BLD VENIPUNCTURE: CPT | Performed by: FAMILY MEDICINE

## 2024-03-05 PROCEDURE — 90471 IMMUNIZATION ADMIN: CPT | Performed by: FAMILY MEDICINE

## 2024-03-05 PROCEDURE — 90713 POLIOVIRUS IPV SC/IM: CPT | Performed by: FAMILY MEDICINE

## 2024-03-05 PROCEDURE — 90480 ADMN SARSCOV2 VAC 1/ONLY CMP: CPT | Performed by: FAMILY MEDICINE

## 2024-03-05 PROCEDURE — 99396 PREV VISIT EST AGE 40-64: CPT | Mod: 25 | Performed by: FAMILY MEDICINE

## 2024-03-05 RX ORDER — ALLOPURINOL 300 MG/1
300 TABLET ORAL DAILY
Qty: 90 TABLET | Refills: 3 | Status: CANCELLED | OUTPATIENT
Start: 2024-03-05

## 2024-03-05 RX ORDER — ALLOPURINOL 300 MG/1
300 TABLET ORAL DAILY
Qty: 90 TABLET | Refills: 3 | Status: SHIPPED | OUTPATIENT
Start: 2024-03-05

## 2024-03-05 RX ORDER — LISINOPRIL 2.5 MG/1
2.5 TABLET ORAL DAILY
Qty: 90 TABLET | Refills: 4 | Status: SHIPPED | OUTPATIENT
Start: 2024-03-05

## 2024-03-05 RX ORDER — ACETAMINOPHEN 325 MG/1
650 TABLET ORAL EVERY 4 HOURS PRN
Qty: 100 TABLET | Refills: 0 | Status: SHIPPED | OUTPATIENT
Start: 2024-03-05

## 2024-03-05 RX ORDER — ATORVASTATIN CALCIUM 20 MG/1
20 TABLET, FILM COATED ORAL DAILY
Qty: 90 TABLET | Refills: 2 | Status: SHIPPED | OUTPATIENT
Start: 2024-03-05
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
## 2024-03-07 PROBLEM — N18.2 TYPE 2 DIABETES MELLITUS WITH STAGE 2 CHRONIC KIDNEY DISEASE, WITHOUT LONG-TERM CURRENT USE OF INSULIN (H): Status: ACTIVE | Noted: 2022-04-20

## 2024-03-07 PROBLEM — E11.22 TYPE 2 DIABETES MELLITUS WITH STAGE 2 CHRONIC KIDNEY DISEASE, WITHOUT LONG-TERM CURRENT USE OF INSULIN (H): Status: ACTIVE | Noted: 2022-04-20

## 2024-03-27 ENCOUNTER — OFFICE VISIT (OUTPATIENT)
Dept: FAMILY MEDICINE | Facility: CLINIC | Age: 44
End: 2024-03-27
Payer: COMMERCIAL

## 2024-03-27 ENCOUNTER — ANCILLARY PROCEDURE (OUTPATIENT)
Dept: GENERAL RADIOLOGY | Facility: CLINIC | Age: 44
End: 2024-03-27
Attending: FAMILY MEDICINE
Payer: COMMERCIAL

## 2024-03-27 VITALS
TEMPERATURE: 97.2 F | HEART RATE: 100 BPM | OXYGEN SATURATION: 98 % | RESPIRATION RATE: 16 BRPM | HEIGHT: 71 IN | BODY MASS INDEX: 29.54 KG/M2 | SYSTOLIC BLOOD PRESSURE: 108 MMHG | WEIGHT: 211 LBS | DIASTOLIC BLOOD PRESSURE: 82 MMHG

## 2024-03-27 DIAGNOSIS — R76.11 POSITIVE SKIN TEST FOR TUBERCULOSIS: ICD-10-CM

## 2024-03-27 DIAGNOSIS — Z22.7 LATENT TUBERCULOSIS: Primary | ICD-10-CM

## 2024-03-27 PROCEDURE — 99214 OFFICE O/P EST MOD 30 MIN: CPT | Performed by: FAMILY MEDICINE

## 2024-03-27 PROCEDURE — 71046 X-RAY EXAM CHEST 2 VIEWS: CPT | Mod: TC | Performed by: STUDENT IN AN ORGANIZED HEALTH CARE EDUCATION/TRAINING PROGRAM

## 2024-03-27 RX ORDER — RIFAMPIN 300 MG/1
600 CAPSULE ORAL DAILY
Qty: 120 CAPSULE | Refills: 0 | Status: SHIPPED | OUTPATIENT
Start: 2024-03-27 | End: 2024-07-01

## 2024-03-27 NOTE — PROGRESS NOTES
"  Assessment & Plan     Positive skin test for tuberculosis  - XR Chest 2 Views    Latent tuberculosis  Discussed exam findings and radiographic findings.  Based on these and positive PPD, has latent tuberculosis.  Discussed treatment with various options recommended by CDC.  Due to other medications the weekly regimen not recommended.  Over the other options rifampin for 4 months was most agreeable to patient.  Prescribed as below.  Discussed follow-up if needed.  Letter provided for work as needed.  - rifampin (RIFADIN) 300 MG capsule  Dispense: 120 capsule; Refill: 0      BMI  Estimated body mass index is 29.54 kg/m  as calculated from the following:    Height as of this encounter: 1.8 m (5' 10.87\").    Weight as of this encounter: 95.7 kg (211 lb).       Subjective   Thet is a 43 year old, presenting for the following health issues:  [place] Results Reading (Tested positive  for tb 3/26/2024)      3/27/2024     7:46 AM   Additional Questions   Roomed by Juanita     History of Present Illness       Reason for visit:  After I visited to [facility] to TB skin test, the result came out that I have positive. [facility] Doctor referred me to meet with primary care clinic to to the Chest Xray as a next step.  Symptoms include:  TB Positive after the skin test  Symptom intensity:  Mild  Symptom progression:  Staying the same  Had these symptoms before:  No  What makes it worse:  No  What makes it better:  No    He eats 2-3 servings of fruits and vegetables daily.He consumes 1 sweetened beverage(s) daily.He exercises with enough effort to increase his heart rate 60 or more minutes per day.  He exercises with enough effort to increase his heart rate 5 days per week.   He is taking medications regularly.     43-year-old man here for concerns as noted above.  Does not have any symptoms of tuberculosis including cough, productive cough, night sweats, loss of weight.  He has no known contacts to tuberculosis.  History is " significant for being born in [place] and living [place].  He had screening for employment as he plans to work as an .        Objective    There were no vitals taken for this visit.  There is no height or weight on file to calculate BMI.  Physical Exam   GENERAL: alert, not distressed  CHEST: clear, no rales, rhonchi, or wheezes  CARDIAC: regular without murmur, gallop, or rub    Personally reviewed the chest x-ray.  No infiltrates.  No sign of tuberculosis.      Prior to immunization administration, verified patients identity using patient s name and date of birth. Please see Immunization Activity for additional information.     Screening Questionnaire for Adult Immunization    Are you sick today?   No   Do you have allergies to medications, food, a vaccine component or latex?   No   Have you ever had a serious reaction after receiving a vaccination?   No   Do you have a long-term health problem with heart, lung, kidney, or metabolic disease (e.g., diabetes), asthma, a blood disorder, no spleen, complement component deficiency, a cochlear implant, or a spinal fluid leak?  Are you on long-term aspirin therapy?   Yes   Do you have cancer, leukemia, HIV/AIDS, or any other immune system problem?   No   Do you have a parent, brother, or sister with an immune system problem?   No   In the past 3 months, have you taken medications that affect  your immune system, such as prednisone, other steroids, or anticancer drugs; drugs for the treatment of rheumatoid arthritis, Crohn s disease, or psoriasis; or have you had radiation treatments?   No   Have you had a seizure, or a brain or other nervous system problem?   No   During the past year, have you received a transfusion of blood or blood    products, or been given immune (gamma) globulin or antiviral drug?   Yes   For women: Are you pregnant or is there a chance you could become       pregnant during the next month?   No   Have you received any  vaccinations in the past 4 weeks?   Yes     Immunization questionnaire was positive for at least one answer.  Notified Dr Singh.      Patient instructed to remain in clinic for 15 minutes afterwards, and to report any adverse reactions.     Screening performed by Juanita Cerda on 3/27/2024 at 7:51 AM.   Signed Electronically by: Cristian Singh MD

## 2024-06-10 DIAGNOSIS — E11.9 TYPE 2 DIABETES MELLITUS WITHOUT COMPLICATION, WITHOUT LONG-TERM CURRENT USE OF INSULIN (H): Primary | ICD-10-CM

## 2024-06-11 ENCOUNTER — OFFICE VISIT (OUTPATIENT)
Dept: FAMILY MEDICINE | Facility: CLINIC | Age: 44
End: 2024-06-11
Payer: COMMERCIAL

## 2024-06-11 ENCOUNTER — PATIENT OUTREACH (OUTPATIENT)
Dept: CARE COORDINATION | Facility: CLINIC | Age: 44
End: 2024-06-11

## 2024-06-11 VITALS
WEIGHT: 206.12 LBS | OXYGEN SATURATION: 99 % | RESPIRATION RATE: 18 BRPM | BODY MASS INDEX: 29.51 KG/M2 | TEMPERATURE: 97.8 F | HEART RATE: 86 BPM | SYSTOLIC BLOOD PRESSURE: 105 MMHG | HEIGHT: 70 IN | DIASTOLIC BLOOD PRESSURE: 74 MMHG

## 2024-06-11 DIAGNOSIS — E11.22 TYPE 2 DIABETES MELLITUS WITH STAGE 3 CHRONIC KIDNEY DISEASE, WITHOUT LONG-TERM CURRENT USE OF INSULIN, UNSPECIFIED WHETHER STAGE 3A OR 3B CKD (H): Primary | ICD-10-CM

## 2024-06-11 DIAGNOSIS — Z71.89 OTHER SPECIFIED COUNSELING: Primary | Chronic | ICD-10-CM

## 2024-06-11 DIAGNOSIS — N18.30 TYPE 2 DIABETES MELLITUS WITH STAGE 3 CHRONIC KIDNEY DISEASE, WITHOUT LONG-TERM CURRENT USE OF INSULIN, UNSPECIFIED WHETHER STAGE 3A OR 3B CKD (H): Primary | ICD-10-CM

## 2024-06-11 DIAGNOSIS — Z59.9 FINANCIAL DIFFICULTIES: ICD-10-CM

## 2024-06-11 DIAGNOSIS — Z22.7 TB LUNG, LATENT: ICD-10-CM

## 2024-06-11 DIAGNOSIS — M16.12 PRIMARY OSTEOARTHRITIS OF LEFT HIP: ICD-10-CM

## 2024-06-11 DIAGNOSIS — Z59.71 DOES NOT HAVE HEALTH INSURANCE: ICD-10-CM

## 2024-06-11 LAB
HBA1C MFR BLD: 7.6 % (ref 0–5.6)
HOLD SPECIMEN: NORMAL
HOLD SPECIMEN: NORMAL

## 2024-06-11 PROCEDURE — 99215 OFFICE O/P EST HI 40 MIN: CPT | Performed by: FAMILY MEDICINE

## 2024-06-11 PROCEDURE — G2211 COMPLEX E/M VISIT ADD ON: HCPCS | Performed by: FAMILY MEDICINE

## 2024-06-11 PROCEDURE — 36415 COLL VENOUS BLD VENIPUNCTURE: CPT | Performed by: FAMILY MEDICINE

## 2024-06-11 PROCEDURE — 83036 HEMOGLOBIN GLYCOSYLATED A1C: CPT | Performed by: FAMILY MEDICINE

## 2024-06-11 PROCEDURE — 99417 PROLNG OP E/M EACH 15 MIN: CPT | Performed by: FAMILY MEDICINE

## 2024-06-11 SDOH — ECONOMIC STABILITY - INCOME SECURITY: PROBLEM RELATED TO HOUSING AND ECONOMIC CIRCUMSTANCES, UNSPECIFIED: Z59.9

## 2024-06-11 NOTE — PROGRESS NOTES
Clinic Care Coordination Contact  Community Health Worker Initial Outreach    CHW Initial Information Gathering:  Referral Source: PCP  Preferred Hospital: [facility]  [phone]  Preferred Urgent Care: [facility], [phone]  Current living arrangement:: I live in a private home with family  Type of residence:: Apartment  Community Resources: None  Supplies Currently Used at Home: None  Equipment Currently Used at Home: none  Informal Support system:: Family  No PCP office visit in Past Year: No  Transportation means:: Family, Medical transport  CHW Additional Questions  If ED/Hospital discharge, follow-up appointment scheduled as recommended?: N/A  Medication changes made following ED/Hospital discharge?: N/A  MyChart active?: No  Patient agreeable to assistance with activating MyChart?: No    Patient accepts CC: No, patient only needs help with health insurance renewal and referral placed to FRW. Patient will be sent Care Coordination introduction letter for future reference.     [place] Department of Human [place]: [place] Health Care Programs Eligibility Response (271)  SUBSCRIBER INFORMATION   Date of Service Subscriber ID Subscriber Name Birthdate Age Gender   06/11/2024 [id number] RADHA HAQ 1980 44 MALE          Address   [address] [facility] [place]  [address]          PROVIDER INFORMATION   Provider ID Submitter Transaction ID Provider Name Taxonomy Code Qualifier Taxonomy Code   [id number] [facility]   This subscriber has eligibility for MA: Medical Assistance.  Elig Type AA: Parent of a dependent child  Eligibility Begin Date: 04/01/2022  Eligibility End Date: 06/30/2024  This subscriber is eligible for the following service types: Medical Care ,  Chiropractic ,  Dental Care ,  Hospital ,  Hospital - Inpatient ,  Hospital - Outpatient ,  Emergency Services ,  Pharmacy ,   Professional (Physician) Visit - Office ,  Vision (Optometry) ,  Mental Health ,  Urgent Care   Prepaid Health Plan   This subscriber receives MA12 - Prepaid Medical Assistance Program (PMAP) delivered through [facility]. The phone numbers are: [phone] (metro) or [phone] (toll free).   Other Eligibility Information   No Special Transportation.  No Hospice.  County of residence is unknown.  Refer to Health Care Programs and Services Overview of the [facility] Provider Manual for a list of covered services.   Waivers   None     Subscriber Responsibility Information   None     Restricted Recipient Program   None       Medicare   None

## 2024-06-11 NOTE — LETTER
2024      To: Whom It May Concern  From: Hanny Vela MD  Re: Cora Senior ( 76)        This letter is to state that for medical reasons, Cora Senior is not to lift over 30 pounds at one time. He is mandated to take his time on steps or in slippery conditions. This is in effect from now until one year (25).     Please reach out if you have questions.    Signed,        Hanny Vela MD

## 2024-06-11 NOTE — PROGRESS NOTES
"  Assessment & Plan     Type 2 diabetes mellitus with stage 3 chronic kidney disease, without long-term current use of insulin, unspecified whether stage 3a or 3b CKD (H)  He thought his sugar would be lower than last time as he is not aware of any high sugar symptoms. Because it is now summer and he plans to be active with his son, we will not add any additional medications to lower his sugars. Instead he hopes to improve the A1-C with lifestyle. Furthermore, he fears not having any meds because his insurance runs out the end of this month.  - Extra Tube  - Hemoglobin A1c  - Extra Tube  - Albumin Random Urine Quantitative with Creat Ratio  - Primary Care - Care Coordination Referral    Does not have health insurance  At the end of June, his insurance will run out. He is quite worried. I told him the back up plan can be to get meds through the [facility] pharmacy program, but we will hope a financial resource person can help him sooner.  - Primary Care - Care Coordination Referral    Primary osteoarthritis of left hip  His hip has healed well from his major surgery. He is glad. He is working on getting the left leg stronger. Due to this problem, I wrote him a note for his  work stating he has medical reasons for limits to what he can carry, etc.    Financial difficulties  He works two jobs, while awaiting being an  --however the  work is \"hit or miss\" so he'll see how it goes.     TB lung, latent  He is on rifampin for this. He knows to take it daily and to take all of it. I instructed him to get a letter declaring his completion of the regimen once he is finished    BMI  Estimated body mass index is 29.58 kg/m  as calculated from the following:    Height as of this encounter: 1.778 m (5' 10\").    Weight as of this encounter: 93.5 kg (206 lb 1.9 oz).                 Subjective   Thet is a 44 year old, presenting for the following health issues:  Diabetes, Lipids, and handicap " "parking form (Need to renew as it  this month.)        2024     7:35 AM   Additional Questions   Roomed by alex kaur MA   Accompanied by son     History of Present Illness       Diabetes:   He presents for follow up of diabetes.    He is not checking blood glucose.         He has no concerns regarding his diabetes at this time.  He is having weight loss.            Hyperlipidemia:  He presents for follow up of hyperlipidemia.   He is taking medication to lower cholesterol. He is not having myalgia or other side effects to statin medications.    He eats 2-3 servings of fruits and vegetables daily.He consumes 3 sweetened beverage(s) daily.He exercises with enough effort to increase his heart rate 60 or more minutes per day.  He exercises with enough effort to increase his heart rate 5 days per week.   He is taking medications regularly.     Feels good as far as diabetes. Not going to the toilet during the night. On metformin 4 tabs in AM.    Is now on rifampin for latent TB.    Is working as an . Likes it.    Left knee pain is better since injection. Still a bit weak. Currently working on his own to strengthen it.     Health insurance ends this month. Needs help to renew/obtain it.  His wife and child are covered.        Objective    /74   Pulse 86   Temp 97.8  F (36.6  C) (Oral)   Resp 18   Ht 1.778 m (5' 10\")   Wt 93.5 kg (206 lb 1.9 oz)   SpO2 99%   BMI 29.58 kg/m    Body mass index is 29.58 kg/m .  Physical Exam   GENERAL: alert and no distress  EYES: Eyes grossly normal to inspection, PERRL and conjunctivae and sclerae normal  NECK: no adenopathy, no asymmetry, masses, or scars  RESP: lungs clear to auscultation - no rales, rhonchi or wheezes  CV: regular rates and rhythm, normal S1 S2, no S3 or S4, and no peripheral edema  SKIN: no suspicious lesions or rashes  PSYCH: mentation appears normal, affect normal/bright  Diabetic foot exam: normal DP and PT pulses, no trophic changes " or ulcerative lesions, and normal sensory exam    Results for orders placed or performed in visit on 06/11/24 (from the past 24 hour(s))   Hemoglobin A1c   Result Value Ref Range    Hemoglobin A1C 7.6 (H) 0.0 - 5.6 %   Extra Tube    Narrative    The following orders were created for panel order Extra Tube.  Procedure                               Abnormality         Status                     ---------                               -----------         ------                     Extra Serum Separator Tu...[[id number]]                      Final result               Extra Green Top (Lithium...[[id number]]                      Final result                 Please view results for these tests on the individual orders.   Extra Serum Separator Tube (SST)   Result Value Ref Range    Hold Specimen JIC    Extra Green Top (Lithium Heparin) Tube   Result Value Ref Range    Hold Specimen JIC          On the date of service I spent 55min preparing for the visit, doing the visit, giving education and advice, writing a letter and charting.    Signed Electronically by: Hanny Vela MD

## 2024-06-12 ENCOUNTER — PATIENT OUTREACH (OUTPATIENT)
Dept: CARE COORDINATION | Facility: CLINIC | Age: 44
End: 2024-06-12
Payer: COMMERCIAL

## 2024-06-12 ENCOUNTER — TELEPHONE (OUTPATIENT)
Dept: FAMILY MEDICINE | Facility: CLINIC | Age: 44
End: 2024-06-12
Payer: COMMERCIAL

## 2024-06-12 NOTE — TELEPHONE ENCOUNTER
Called pt and relayed Dr. Vela's message. Pt verbalized understanding.      Deng Howard BSN RN  [facility]

## 2024-06-12 NOTE — TELEPHONE ENCOUNTER
Please call Thet Vinod Senior.  Let him know that Dr. Vela found out he does not need to go back to see Dr. Singh. When Thet finishes his rifampin, Dr. Vela can write the note stating his treatment is complete (can write it when he calls to say he is finished OR can write it up the next time he comes to the clinic).    thanks

## 2024-06-12 NOTE — TELEPHONE ENCOUNTER
----- Message from Cristian Singh MD sent at 6/12/2024  9:22 AM CDT -----  Regarding: RE: follow up  Hi Mandie,  No, I don't think it's necessary for him to come in.  I don't typically do that for LTBI...  We can let it be  Thanks,  tp  ----- Message -----  From: Hanny Vela MD  Sent: 6/11/2024   8:22 AM CDT  To: Cristian Singh MD  Subject: follow up                                        Hi -  This patient had an appointment with you 6/18. He is wondering if that is needed, because it was cancelled. Does he need to see you to get documentation of completing the rifampin? If you want to see him, please ask your staff to reach out and he'll come in to see you.  Thanks  Mandie Vela

## 2024-06-20 ENCOUNTER — PATIENT OUTREACH (OUTPATIENT)
Dept: CARE COORDINATION | Facility: CLINIC | Age: 44
End: 2024-06-20
Payer: COMMERCIAL

## 2024-06-29 ENCOUNTER — MYC MEDICAL ADVICE (OUTPATIENT)
Dept: FAMILY MEDICINE | Facility: CLINIC | Age: 44
End: 2024-06-29
Payer: COMMERCIAL

## 2024-07-01 DIAGNOSIS — Z22.7 LATENT TUBERCULOSIS: ICD-10-CM

## 2024-07-01 RX ORDER — RIFAMPIN 300 MG/1
600 CAPSULE ORAL DAILY
Qty: 120 CAPSULE | Refills: 0 | Status: SHIPPED | OUTPATIENT
Start: 2024-07-01

## 2024-09-04 ENCOUNTER — MYC MEDICAL ADVICE (OUTPATIENT)
Dept: ORTHOPEDICS | Facility: CLINIC | Age: 44
End: 2024-09-04
Payer: COMMERCIAL

## 2024-09-05 DIAGNOSIS — Z96.642 S/P TOTAL LEFT HIP ARTHROPLASTY: Primary | ICD-10-CM

## 2024-09-05 NOTE — TELEPHONE ENCOUNTER
Dental form complete. Faxed to [facility] at [phone], copy to scanning, copy placed in RN faxed items drawer.    Shena Quarles RN  [facility]  9/5/2024 11:45 AM

## 2024-09-05 NOTE — TELEPHONE ENCOUNTER
Reason for Call:  Form, our goal is to have forms completed with 72 hours, however, some forms may require a visit or additional information.    Type of letter, form or note:  medical - Dental     Who is the form from?: [facility]    Where did the form come from: form was sent via [employer]    What clinic location was the form placed at?: [facility]    Where the form was placed: Given to MA/RN    What number is listed as a contact on the form?: Phone: [phone]  Fax: [phone]       Additional comments: Angelique Quarles, RN   [facility]

## 2024-09-11 ASSESSMENT — HOOS JR: HOOS JR TOTAL INTERVAL SCORE: 100

## 2024-09-12 ENCOUNTER — OFFICE VISIT (OUTPATIENT)
Dept: ORTHOPEDICS | Facility: CLINIC | Age: 44
End: 2024-09-12
Payer: COMMERCIAL

## 2024-09-12 ENCOUNTER — ANCILLARY PROCEDURE (OUTPATIENT)
Dept: GENERAL RADIOLOGY | Facility: CLINIC | Age: 44
End: 2024-09-12
Attending: ORTHOPAEDIC SURGERY
Payer: COMMERCIAL

## 2024-09-12 DIAGNOSIS — Z96.642 S/P TOTAL LEFT HIP ARTHROPLASTY: Primary | ICD-10-CM

## 2024-09-12 DIAGNOSIS — Z96.642 S/P TOTAL LEFT HIP ARTHROPLASTY: ICD-10-CM

## 2024-09-12 PROCEDURE — 99213 OFFICE O/P EST LOW 20 MIN: CPT | Performed by: ORTHOPAEDIC SURGERY

## 2024-09-12 PROCEDURE — 73502 X-RAY EXAM HIP UNI 2-3 VIEWS: CPT | Performed by: RADIOLOGY

## 2024-09-12 NOTE — NURSING NOTE
Reason For Visit:   Chief Complaint   Patient presents with    RECHECK     1 year f/u (previous Dr Orozco pt) DOS 6/13/23 Left total hip, anterolateral approach       There were no vitals taken for this visit.         Claribel Wellington, ATC

## 2024-09-12 NOTE — PROGRESS NOTES
Chief Complaint: RECHECK (1 year f/u (previous Dr Orozco pt) DOS 6/13/23 Left total hip, anterolateral approach)         HPI: Thet Vinod Senior returns today in follow-up for her left hip. He is here for routine follow-up.  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 Done by my partner jey Orozco. He reports he is doing very well.  He says no trouble with his hip over the past year.  He is here for radiographic follow-up.    Pain medication: No  Assist device: No  Physical therapy: Not applicable    Current Status:  HOOS Jr: 100  UCLA:  Global Mental Health Score - (P) 17  Global Physical Health Score - (P) 17  PROMIS TOTAL - SUBSCORES - (P) 34  Medications and allergies are documented in the EMR and have been reviewed.      Current Outpatient Medications:     acetaminophen (TYLENOL) 325 MG tablet, Take 2 tablets (650 mg) by mouth every 4 hours as needed for other (mild pain), Disp: 100 tablet, Rfl: 0    allopurinol (ZYLOPRIM) 300 MG tablet, Take 1 tablet (300 mg) by mouth daily, Disp: 90 tablet, Rfl: 3    atorvastatin (LIPITOR) 20 MG tablet, Take 1 tablet (20 mg) by mouth daily, Disp: 90 tablet, Rfl: 2    indomethacin (INDOCIN) 50 MG capsule, TAKE 1 CAPSULE BY MOUTH TWICE DAILY WITH MEALS, Disp: 50 capsule, Rfl: 4    lisinopril (ZESTRIL) 2.5 MG tablet, Take 1 tablet (2.5 mg) by mouth daily, Disp: 90 tablet, Rfl: 4    metFORMIN (GLUCOPHAGE XR) 500 MG 24 hr tablet, TAKE 4 TABLETS(2000 MG) BY MOUTH DAILY WITH DINNER, Disp: 120 tablet, Rfl: 2    rifampin (RIFADIN) 300 MG capsule, Take 2 capsules (600 mg) by mouth daily, Disp: 120 capsule, Rfl: 0    vitamin D2 (ERGOCALCIFEROL) 99573 units (1250 mcg) capsule, Take 1 capsule (50,000 Units) by mouth once a week, Disp: 12 capsule, Rfl: 4    Current Facility-Administered Medications:     lidocaine (PF) (XYLOCAINE) 1 % injection 3 mL, 3 mL, , , Arthur Barcenas DO, 3 mL at 11/03/23 5601    lidocaine (PF) 0.5 % injection SOLN 8 mL, 8 mL, , , Dominicka,  Sam Daniels MD, 8 mL at 11/27/23 0814    triamcinolone (KENALOG-40) injection 40 mg, 40 mg, , , Sam uRiz MD, 40 mg at 11/27/23 0814    Allergies: Patient has no known allergies.        Physical Exam:  On physical examination the patient appears the stated age, is in no acute distress, affect is appropriate, and breathing is non-labored.    There were no vitals taken for this visit.  There is no height or weight on file to calculate BMI.    Rises from chair: Easily  Gait: normal  ROM:  fluid and painless  Sciatic motor function is normal and symmetric   We reviewed the radiographs together. These show the normal progression for a total hip arthroplasty without evidence of loosening or subsidence.     Assessment: one year s/p IVONNE after girdlestone, doing very well. We discussed activities on total hip. Discussed follow-up.     Plan: RTC in 5 years, sooner if issues. \      Twenty minutes were spent with the patient with greater than 50% on counseling and coordination of care.       Referred Self\

## 2024-09-12 NOTE — LETTER
9/12/2024      Cora Senior  [address]  [place] [address]      Dear Colleague,    Thank you for referring your patient, Cora Senior, to the [facility] [place]. Please see a copy of my visit note below.    Chief Complaint: RECHECK (1 year f/u (previous Dr Orozco pt) DOS 6/13/23 Left total hip, anterolateral approach)         HPI: Cora Senior returns today in follow-up for her left hip. He is here for routine follow-up.  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 Done by my partner jey Orozco. He reports he is doing very well.  He says no trouble with his hip over the past year.  He is here for radiographic follow-up.    Pain medication: No  Assist device: No  Physical therapy: Not applicable    Current Status:  HOOS Jr: 100  UCLA:  Global Mental Health Score - (P) 17  Global Physical Health Score - (P) 17  PROMIS TOTAL - SUBSCORES - (P) 34  Medications and allergies are documented in the EMR and have been reviewed.      Current Outpatient Medications:      acetaminophen (TYLENOL) 325 MG tablet, Take 2 tablets (650 mg) by mouth every 4 hours as needed for other (mild pain), Disp: 100 tablet, Rfl: 0     allopurinol (ZYLOPRIM) 300 MG tablet, Take 1 tablet (300 mg) by mouth daily, Disp: 90 tablet, Rfl: 3     atorvastatin (LIPITOR) 20 MG tablet, Take 1 tablet (20 mg) by mouth daily, Disp: 90 tablet, Rfl: 2     indomethacin (INDOCIN) 50 MG capsule, TAKE 1 CAPSULE BY MOUTH TWICE DAILY WITH MEALS, Disp: 50 capsule, Rfl: 4     lisinopril (ZESTRIL) 2.5 MG tablet, Take 1 tablet (2.5 mg) by mouth daily, Disp: 90 tablet, Rfl: 4     metFORMIN (GLUCOPHAGE XR) 500 MG 24 hr tablet, TAKE 4 TABLETS(2000 MG) BY MOUTH DAILY WITH DINNER, Disp: 120 tablet, Rfl: 2     rifampin (RIFADIN) 300 MG capsule, Take 2 capsules (600 mg) by mouth daily, Disp: 120 capsule, Rfl: 0     vitamin D2 (ERGOCALCIFEROL) 48649 units (1250 mcg) capsule, Take 1 capsule (50,000  Units) by mouth once a week, Disp: 12 capsule, Rfl: 4    Current Facility-Administered Medications:      lidocaine (PF) (XYLOCAINE) 1 % injection 3 mL, 3 mL, , , DejahpauArthur DO, 3 mL at 11/03/23 1654     lidocaine (PF) 0.5 % injection SOLN 8 mL, 8 mL, , , Sam Ruiz MD, 8 mL at 11/27/23 0814     triamcinolone (KENALOG-40) injection 40 mg, 40 mg, , , Sam Ruiz MD, 40 mg at 11/27/23 0814    Allergies: Patient has no known allergies.        Physical Exam:  On physical examination the patient appears the stated age, is in no acute distress, affect is appropriate, and breathing is non-labored.    There were no vitals taken for this visit.  There is no height or weight on file to calculate BMI.    Rises from chair: Easily  Gait: normal  ROM:  fluid and painless  Sciatic motor function is normal and symmetric   We reviewed the radiographs together. These show the normal progression for a total hip arthroplasty without evidence of loosening or subsidence.     Assessment: one year s/p IVONNE after girdlestone, doing very well. We discussed activities on total hip. Discussed follow-up.     Plan: RTC in 5 years, sooner if issues. \      Twenty minutes were spent with the patient with greater than 50% on counseling and coordination of care.       Referred Self\      Again, thank you for allowing me to participate in the care of your patient.        Sincerely,        Joe Fraire MD

## 2024-09-26 ENCOUNTER — IMMUNIZATION (OUTPATIENT)
Dept: FAMILY MEDICINE | Facility: CLINIC | Age: 44
End: 2024-09-26
Payer: COMMERCIAL

## 2024-09-26 VITALS — TEMPERATURE: 98.3 F

## 2024-09-26 DIAGNOSIS — Z23 ENCOUNTER FOR IMMUNIZATION: ICD-10-CM

## 2024-09-26 DIAGNOSIS — Z23 NEED FOR PROPHYLACTIC VACCINATION AND INOCULATION AGAINST INFLUENZA: Primary | ICD-10-CM

## 2024-09-26 PROCEDURE — 91320 SARSCV2 VAC 30MCG TRS-SUC IM: CPT

## 2024-09-26 PROCEDURE — 90480 ADMN SARSCOV2 VAC 1/ONLY CMP: CPT

## 2024-09-26 PROCEDURE — 90471 IMMUNIZATION ADMIN: CPT

## 2024-09-26 PROCEDURE — 90656 IIV3 VACC NO PRSV 0.5 ML IM: CPT

## 2024-09-26 PROCEDURE — 99207 PR NO CHARGE NURSE ONLY: CPT

## 2024-09-26 NOTE — PROGRESS NOTES
Prior to immunization administration, verified patients identity using patient s name and date of birth. Please see Immunization Activity for additional information.     Is the patient's temperature normal (100.5 or less)? Yes     Patient MEETS CRITERIA. PROCEED with vaccine administration.      Patient instructed to remain in clinic for 15 minutes afterwards, and to report any adverse reactions.      Link to Ancillary Visit Immunization Standing Orders SmartSet     Screening performed by Romy Weldon MA on 9/26/2024 at 8:58 AM.          Prior to immunization administration, verified patients identity using patient s name and date of birth. Please see Immunization Activity for additional information.     Is the patient's temperature normal (100.5 or less)? Yes     Patient MEETS CRITERIA. PROCEED with vaccine administration.      Patient instructed to remain in clinic for 15 minutes afterwards, and to report any adverse reactions.      Link to Ancillary Visit Immunization Standing Orders SmartSet     Screening performed by Romy Weldon MA on 9/26/2024 at 8:59 AM.

## 2024-10-06 ENCOUNTER — HEALTH MAINTENANCE LETTER (OUTPATIENT)
Age: 44
End: 2024-10-06

## 2024-10-08 DIAGNOSIS — E11.9 TYPE 2 DIABETES MELLITUS WITHOUT COMPLICATION, WITHOUT LONG-TERM CURRENT USE OF INSULIN (H): ICD-10-CM

## 2024-10-08 RX ORDER — METFORMIN HYDROCHLORIDE 500 MG/1
2000 TABLET, EXTENDED RELEASE ORAL
Qty: 120 TABLET | Refills: 2 | Status: SHIPPED | OUTPATIENT
Start: 2024-10-08

## 2024-11-24 NOTE — COMMUNITY RESOURCES LIST (PATIENT PREFERRED LANGUAGE)
12/14/2022   [facility] Outpatient Clinics  N/A  ????????????????? ????????? ????????????????????????????????????????????????????????????????? ??????????? ????????????????????????????????? ????????? ????????????????????????? ??????????  Phone: [phone]   Email: N/A   Address: [address] [place] [address]   Hours: N/A        ??????????????       ??? ?? ?????????????????  1  [facility] - ?????????? ?????????????????????? ????????: 0.82 ?????      ?????/???   [address] [place] [address]  ????????: ???????????  ????: ?????????? - ????????? 08:00 - 17:00  ???????: ??????????????????????, ?????   Phone: [phone] Email: [email or web address]     2  [facility]???? [facility] - ?????????? ?????????????????????? ????????: 1.55 ?????      ?????/???   [address] [place] [address]  ????????: ???, ?????, ???????????  ????: ?????????? - ????????? 09:00 - 18:00  ???????: ??????????????????????, ?????   Phone: [phone] Email: [email or web address] Website: [email or web address]     ?????????????? ?????????????????  3  [facility] - ????????????????? ??????? ????????: 2.79 ?????      ????????????, ?????/???   [address] [place] [address]  ????????: ???????????  ????: ?????????? - ??????????? 12:00 - 17:00  ???????: ?????   Phone: [phone] Email: [email or web address] Website: [email or web address]     4  [facility] ?????????????? ????????: 3.3 ?????      ????????????, ?????/???   [address] [place] [address]  ????????: ???????????  ????: ?????????? - ????????? 08:00 - 17:00  ???????: ?????   Phone: [phone] Email: [email or web address] Website: [email or web address]          ??????????????????????? ????????????       ???????????????????????   [address] [place] [address]    311  ??????????????????   [phone]  ???????????????????? Lifeline   [phone] (TALK)  ?????????????????? Hotline   [phone]) [phone] (4-A-Child)  ??????????????????????????? hotline   [phone]) 496-8907 (HOPE)  ????????????????? [employer]   [phone] (RUNAWAY)  [employer] Talkline   [phone]  ????????????????????????????????   [phone] (HELP)     EKG - see results section for interpretation/Xray Image(s) - see wet read section for interpretation

## 2024-12-16 DIAGNOSIS — M1A.0720 CHRONIC GOUT OF LEFT ANKLE, UNSPECIFIED CAUSE: ICD-10-CM

## 2024-12-16 DIAGNOSIS — E11.9 TYPE 2 DIABETES MELLITUS WITHOUT COMPLICATION, WITHOUT LONG-TERM CURRENT USE OF INSULIN (H): Primary | ICD-10-CM

## 2024-12-16 DIAGNOSIS — E11.22 TYPE 2 DIABETES MELLITUS WITH STAGE 3 CHRONIC KIDNEY DISEASE, WITHOUT LONG-TERM CURRENT USE OF INSULIN, UNSPECIFIED WHETHER STAGE 3A OR 3B CKD (H): ICD-10-CM

## 2024-12-16 DIAGNOSIS — E78.5 HYPERLIPIDEMIA LDL GOAL <100: ICD-10-CM

## 2024-12-16 DIAGNOSIS — N18.30 TYPE 2 DIABETES MELLITUS WITH STAGE 3 CHRONIC KIDNEY DISEASE, WITHOUT LONG-TERM CURRENT USE OF INSULIN, UNSPECIFIED WHETHER STAGE 3A OR 3B CKD (H): ICD-10-CM

## 2024-12-16 DIAGNOSIS — E55.9 VITAMIN D DEFICIENCY: ICD-10-CM

## 2024-12-17 ENCOUNTER — OFFICE VISIT (OUTPATIENT)
Dept: FAMILY MEDICINE | Facility: CLINIC | Age: 44
End: 2024-12-17
Payer: COMMERCIAL

## 2024-12-17 VITALS
HEIGHT: 70 IN | SYSTOLIC BLOOD PRESSURE: 116 MMHG | BODY MASS INDEX: 29.78 KG/M2 | RESPIRATION RATE: 16 BRPM | TEMPERATURE: 98 F | DIASTOLIC BLOOD PRESSURE: 80 MMHG | HEART RATE: 108 BPM | OXYGEN SATURATION: 99 % | WEIGHT: 208 LBS

## 2024-12-17 DIAGNOSIS — R00.2 PALPITATIONS: ICD-10-CM

## 2024-12-17 DIAGNOSIS — E11.22 TYPE 2 DIABETES MELLITUS WITH STAGE 3 CHRONIC KIDNEY DISEASE, WITHOUT LONG-TERM CURRENT USE OF INSULIN, UNSPECIFIED WHETHER STAGE 3A OR 3B CKD (H): Primary | ICD-10-CM

## 2024-12-17 DIAGNOSIS — F43.12 CHRONIC POST-TRAUMATIC STRESS DISORDER (PTSD): ICD-10-CM

## 2024-12-17 DIAGNOSIS — N18.30 TYPE 2 DIABETES MELLITUS WITH STAGE 3 CHRONIC KIDNEY DISEASE, WITHOUT LONG-TERM CURRENT USE OF INSULIN, UNSPECIFIED WHETHER STAGE 3A OR 3B CKD (H): Primary | ICD-10-CM

## 2024-12-17 DIAGNOSIS — E78.5 HYPERLIPIDEMIA LDL GOAL <100: ICD-10-CM

## 2024-12-17 DIAGNOSIS — E55.9 VITAMIN D DEFICIENCY: ICD-10-CM

## 2024-12-17 DIAGNOSIS — E11.9 TYPE 2 DIABETES MELLITUS WITHOUT COMPLICATION, WITHOUT LONG-TERM CURRENT USE OF INSULIN (H): ICD-10-CM

## 2024-12-17 DIAGNOSIS — Z00.00 PREVENTATIVE HEALTH CARE: ICD-10-CM

## 2024-12-17 DIAGNOSIS — M1A.0720 CHRONIC GOUT OF LEFT ANKLE, UNSPECIFIED CAUSE: ICD-10-CM

## 2024-12-17 LAB
ALBUMIN SERPL BCG-MCNC: 4.4 G/DL (ref 3.5–5.2)
ALBUMIN UR-MCNC: NEGATIVE MG/DL
ALP SERPL-CCNC: 80 U/L (ref 40–150)
ALT SERPL W P-5'-P-CCNC: 9 U/L (ref 0–70)
ANION GAP SERPL CALCULATED.3IONS-SCNC: 11 MMOL/L (ref 7–15)
APPEARANCE UR: CLEAR
AST SERPL W P-5'-P-CCNC: 20 U/L (ref 0–45)
ATRIAL RATE - MUSE: 94 BPM
BASOPHILS # BLD AUTO: 0 10E3/UL (ref 0–0.2)
BASOPHILS NFR BLD AUTO: 1 %
BILIRUB SERPL-MCNC: 0.4 MG/DL
BILIRUB UR QL STRIP: NEGATIVE
BUN SERPL-MCNC: 9.1 MG/DL (ref 6–20)
CALCIUM SERPL-MCNC: 9.3 MG/DL (ref 8.8–10.4)
CHLORIDE SERPL-SCNC: 99 MMOL/L (ref 98–107)
CHOLEST SERPL-MCNC: 209 MG/DL
COLOR UR AUTO: YELLOW
CREAT SERPL-MCNC: 0.87 MG/DL (ref 0.67–1.17)
DIASTOLIC BLOOD PRESSURE - MUSE: NORMAL MMHG
EGFRCR SERPLBLD CKD-EPI 2021: >90 ML/MIN/1.73M2
EOSINOPHIL # BLD AUTO: 0.2 10E3/UL (ref 0–0.7)
EOSINOPHIL NFR BLD AUTO: 4 %
ERYTHROCYTE [DISTWIDTH] IN BLOOD BY AUTOMATED COUNT: 12.9 % (ref 10–15)
EST. AVERAGE GLUCOSE BLD GHB EST-MCNC: 137 MG/DL
FASTING STATUS PATIENT QL REPORTED: NO
FASTING STATUS PATIENT QL REPORTED: NO
GLUCOSE SERPL-MCNC: 221 MG/DL (ref 70–99)
GLUCOSE UR STRIP-MCNC: 500 MG/DL
HBA1C MFR BLD: 6.4 % (ref 0–5.6)
HCO3 SERPL-SCNC: 26 MMOL/L (ref 22–29)
HCT VFR BLD AUTO: 45.6 % (ref 40–53)
HDLC SERPL-MCNC: 33 MG/DL
HGB BLD-MCNC: 15.8 G/DL (ref 13.3–17.7)
HGB UR QL STRIP: NEGATIVE
IMM GRANULOCYTES # BLD: 0 10E3/UL
IMM GRANULOCYTES NFR BLD: 0 %
INTERPRETATION ECG - MUSE: NORMAL
KETONES UR STRIP-MCNC: NEGATIVE MG/DL
LDLC SERPL CALC-MCNC: ABNORMAL MG/DL
LDLC SERPL DIRECT ASSAY-MCNC: 82 MG/DL
LEUKOCYTE ESTERASE UR QL STRIP: NEGATIVE
LYMPHOCYTES # BLD AUTO: 1.9 10E3/UL (ref 0.8–5.3)
LYMPHOCYTES NFR BLD AUTO: 29 %
MCH RBC QN AUTO: 30 PG (ref 26.5–33)
MCHC RBC AUTO-ENTMCNC: 34.6 G/DL (ref 31.5–36.5)
MCV RBC AUTO: 87 FL (ref 78–100)
MONOCYTES # BLD AUTO: 0.4 10E3/UL (ref 0–1.3)
MONOCYTES NFR BLD AUTO: 7 %
NEUTROPHILS # BLD AUTO: 3.9 10E3/UL (ref 1.6–8.3)
NEUTROPHILS NFR BLD AUTO: 60 %
NITRATE UR QL: NEGATIVE
NONHDLC SERPL-MCNC: 176 MG/DL
P AXIS - MUSE: 40 DEGREES
PH UR STRIP: 6 [PH] (ref 5–7)
PLATELET # BLD AUTO: 242 10E3/UL (ref 150–450)
POTASSIUM SERPL-SCNC: 4.2 MMOL/L (ref 3.4–5.3)
PR INTERVAL - MUSE: 154 MS
PROT SERPL-MCNC: 7.7 G/DL (ref 6.4–8.3)
QRS DURATION - MUSE: 80 MS
QT - MUSE: 364 MS
QTC - MUSE: 455 MS
R AXIS - MUSE: 15 DEGREES
RBC # BLD AUTO: 5.26 10E6/UL (ref 4.4–5.9)
SODIUM SERPL-SCNC: 136 MMOL/L (ref 135–145)
SP GR UR STRIP: 1.02 (ref 1–1.03)
SYSTOLIC BLOOD PRESSURE - MUSE: NORMAL MMHG
T AXIS - MUSE: 34 DEGREES
TRIGL SERPL-MCNC: 637 MG/DL
TSH SERPL DL<=0.005 MIU/L-ACNC: 0.97 UIU/ML (ref 0.3–4.2)
URATE SERPL-MCNC: 5 MG/DL (ref 3.4–7)
UROBILINOGEN UR STRIP-ACNC: 0.2 E.U./DL
VENTRICULAR RATE- MUSE: 94 BPM
VIT D+METAB SERPL-MCNC: 29 NG/ML (ref 20–50)
WBC # BLD AUTO: 6.6 10E3/UL (ref 4–11)

## 2024-12-17 PROCEDURE — 84550 ASSAY OF BLOOD/URIC ACID: CPT | Performed by: FAMILY MEDICINE

## 2024-12-17 PROCEDURE — 84443 ASSAY THYROID STIM HORMONE: CPT | Performed by: FAMILY MEDICINE

## 2024-12-17 PROCEDURE — 36415 COLL VENOUS BLD VENIPUNCTURE: CPT | Performed by: FAMILY MEDICINE

## 2024-12-17 PROCEDURE — 85025 COMPLETE CBC W/AUTO DIFF WBC: CPT | Performed by: FAMILY MEDICINE

## 2024-12-17 PROCEDURE — 82306 VITAMIN D 25 HYDROXY: CPT | Performed by: FAMILY MEDICINE

## 2024-12-17 PROCEDURE — 81003 URINALYSIS AUTO W/O SCOPE: CPT | Performed by: FAMILY MEDICINE

## 2024-12-17 PROCEDURE — 83721 ASSAY OF BLOOD LIPOPROTEIN: CPT | Mod: 59 | Performed by: FAMILY MEDICINE

## 2024-12-17 PROCEDURE — 80053 COMPREHEN METABOLIC PANEL: CPT | Performed by: FAMILY MEDICINE

## 2024-12-17 PROCEDURE — 80061 LIPID PANEL: CPT | Performed by: FAMILY MEDICINE

## 2024-12-17 PROCEDURE — 83036 HEMOGLOBIN GLYCOSYLATED A1C: CPT | Performed by: FAMILY MEDICINE

## 2024-12-17 RX ORDER — METFORMIN HYDROCHLORIDE 500 MG/1
2000 TABLET, EXTENDED RELEASE ORAL
Qty: 360 TABLET | Refills: 4 | Status: SHIPPED | OUTPATIENT
Start: 2024-12-17

## 2024-12-17 NOTE — PROGRESS NOTES
Assessment & Plan     Type 2 diabetes mellitus with stage 3 chronic kidney disease, without long-term current use of insulin, unspecified whether stage 3a or 3b CKD (H)  A1-C is now 6.4 which is low enough that we can decrease his meds. He has healed well from his hip surgery, and thus he is able to be more active - so he needs less medication. If he remains active, we can change his Metformin 2g per day to 1g per day  - CBC with platelets and differential  - Comprehensive metabolic panel (BMP + Alb, Alk Phos, ALT, AST, Total. Bili, TP)  - Lipid panel reflex to direct LDL Non-fasting  - Hemoglobin A1c  - UA Macroscopic with reflex to Microscopic and Culture - Lab Collect  - OPTOMETRY REFERRAL  - Adult [facility] Referral  - LDL cholesterol direct    Hyperlipidemia LDL goal <100  On statin. Lipids are ok for now - will check lipoprotein a next time  - Lipid panel reflex to direct LDL Non-fasting  - [facility]  - LDL cholesterol direct    Chronic gout of left ankle, unspecified cause  Uric acid normal - good!  - Uric acid    Vitamin D deficiency  Normal result so no supplement needed at this time  - Vitamin D Deficiency    Type 2 diabetes mellitus without complication, without long-term current use of insulin (H)  As above  - metFORMIN (GLUCOPHAGE XR) 500 MG 24 hr tablet  Dispense: 360 tablet; Refill: 4    Preventative health care  reviewed  - REVIEW OF HEALTH MAINTENANCE PROTOCOL ORDERS    Palpitations  Not sure what is causing this. He has sinus rhythm. Check with Zio patch.  - EKG 12-lead, tracing only  - TSH with free T4 reflex  - TSH with free T4 reflex  - [facility]    Chronic post-traumatic stress disorder (PTSD)  He has realized he has this now and wants to seek counseling to help himself.  - Adult [facility]  Referral    Nicotine/Tobacco Cessation  He reports that he has been smoking cigarettes. He started smoking about 29  "years ago. He has a 13.8 pack-year smoking history. He has been exposed to tobacco smoke. He has never used smokeless tobacco.  Nicotine/Tobacco Cessation Plan  Self help information given to patient      BMI  Estimated body mass index is 29.84 kg/m  as calculated from the following:    Height as of this encounter: 1.778 m (5' 10\").    Weight as of this encounter: 94.3 kg (208 lb).   Weight management plan: Discussed healthy diet and exercise guidelines    On the day of service, I spent 30 minutes with the patient, preparing for the visit with chart review, and charting.                    Subjective   Thet is a 44 year old, presenting for the following health issues:  Follow Up (Diabetes ) and Irregular Heart Beat (Last 2 days and feels like sob )      12/17/2024     8:35 AM   Additional Questions   Roomed by ERMELINDA Mota   Accompanied by self     History of Present Illness       Diabetes:   He presents for follow up of diabetes.    He is not checking blood glucose.         He has no concerns regarding his diabetes at this time.   He is not experiencing numbness or burning in feet, excessive thirst, blurry vision, weight changes or redness, sores or blisters on feet. The patient has not had a diabetic eye exam in the last 12 months.          He eats 2-3 servings of fruits and vegetables daily.He consumes 1 sweetened beverage(s) daily.He exercises with enough effort to increase his heart rate 9 or less minutes per day.  He exercises with enough effort to increase his heart rate 3 or less days per week.   He is taking medications regularly.     Limits rice to 1c.  Walks more because his hip is better  Takes his meds - metformin, and got probiotics OTC which solidifies his stool (which was loose due to metformin)    Having some heart palpitations  Going on continuously for 2 days  Mildly short of breath, no sweats  No big stressors    His son - stress and anxiety - went to [place].   They have trauma counselor - " "Thet would like to do the counseling, but he needs a referral.        Objective    /80 (BP Location: Left arm, Patient Position: Sitting, Cuff Size: Adult Regular)   Pulse 108   Temp 98  F (36.7  C) (Oral)   Resp 16   Ht 1.778 m (5' 10\")   Wt 94.3 kg (208 lb)   SpO2 99%   BMI 29.84 kg/m    Body mass index is 29.84 kg/m .  Physical Exam   GENERAL: alert and no distress  EYES: Eyes grossly normal to inspection, PERRL and conjunctivae and sclerae normal  RESP: lungs clear to auscultation - no rales, rhonchi or wheezes  CV: regular rate and rhythm, normal S1 S2, no S3 or S4, no murmur, click or rub, no peripheral edema  MS: no gross musculoskeletal defects noted, no edema  SKIN: no suspicious lesions or rashes  PSYCH: mentation appears normal, affect flat, judgement and insight intact, and appearance well groomed    Office Visit on 06/11/2024   Component Date Value Ref Range Status    Hemoglobin A1C 06/11/2024 7.6 (H)  0.0 - 5.6 % Final    Normal <5.7%   Prediabetes 5.7-6.4%    Diabetes 6.5% or higher     Note: Adopted from ADA consensus guidelines.    Hold Specimen 06/11/2024 [facility]   Final    Hold Specimen 06/11/2024 [facility]   Final     Results for orders placed or performed in visit on 12/17/24   Comprehensive metabolic panel (BMP + Alb, Alk Phos, ALT, AST, Total. Bili, TP)     Status: Abnormal   Result Value Ref Range    Sodium 136 135 - 145 mmol/L    Potassium 4.2 3.4 - 5.3 mmol/L    Carbon Dioxide (CO2) 26 22 - 29 mmol/L    Anion Gap 11 7 - 15 mmol/L    Urea Nitrogen 9.1 6.0 - 20.0 mg/dL    Creatinine 0.87 0.67 - 1.17 mg/dL    GFR Estimate >90 >60 mL/min/1.73m2    Calcium 9.3 8.8 - 10.4 mg/dL    Chloride 99 98 - 107 mmol/L    Glucose 221 (H) 70 - 99 mg/dL    Alkaline Phosphatase 80 40 - 150 U/L    AST 20 0 - 45 U/L    ALT 9 0 - 70 U/L    Protein Total 7.7 6.4 - 8.3 g/dL    Albumin 4.4 3.5 - 5.2 g/dL    Bilirubin Total 0.4 <=1.2 mg/dL    Patient Fasting > 8hrs? No    Lipid panel reflex to direct LDL " Non-fasting     Status: Abnormal   Result Value Ref Range    Cholesterol 209 (H) <200 mg/dL    Triglycerides 637 (H) <150 mg/dL    Direct Measure HDL 33 (L) >=40 mg/dL    LDL Cholesterol Calculated      Non HDL Cholesterol 176 (H) <130 mg/dL    Patient Fasting > 8hrs? No     Narrative    Cholesterol  Desirable: < 200 mg/dL  Borderline High: 200 - 239 mg/dL  High: >= 240 mg/dL    Triglycerides  Normal: < 150 mg/dL  Borderline High: 150 - 199 mg/dL  High: 200-499 mg/dL  Very High: >= 500 mg/dL    Direct Measure HDL  Female: >= 50 mg/dL   Male: >= 40 mg/dL    LDL Cholesterol  Desirable: < 100 mg/dL  Above Desirable: 100 - 129 mg/dL   Borderline High: 130 - 159 mg/dL   High:  160 - 189 mg/dL   Very High: >= 190 mg/dL    Non HDL Cholesterol  Desirable: < 130 mg/dL  Above Desirable: 130 - 159 mg/dL  Borderline High: 160 - 189 mg/dL  High: 190 - 219 mg/dL  Very High: >= 220 mg/dL   Hemoglobin A1c     Status: Abnormal   Result Value Ref Range    Estimated Average Glucose 137 (H) <117 mg/dL    Hemoglobin A1C 6.4 (H) 0.0 - 5.6 %   UA Macroscopic with reflex to Microscopic and Culture - Lab Collect     Status: Abnormal    Specimen: Urine, NOS   Result Value Ref Range    Color Urine Yellow Colorless, Straw, Light Yellow, Yellow    Appearance Urine Clear Clear    Glucose Urine 500 (A) Negative mg/dL    Bilirubin Urine Negative Negative    Ketones Urine Negative Negative mg/dL    Specific Gravity Urine 1.020 1.005 - 1.030    Blood Urine Negative Negative    pH Urine 6.0 5.0 - 7.0    Protein Albumin Urine Negative Negative mg/dL    Urobilinogen Urine 0.2 0.2, 1.0 E.U./dL    Nitrite Urine Negative Negative    Leukocyte Esterase Urine Negative Negative    Narrative    Microscopic not indicated   Uric acid     Status: Normal   Result Value Ref Range    Uric Acid 5.0 3.4 - 7.0 mg/dL   Vitamin D Deficiency     Status: Normal   Result Value Ref Range    Vitamin D, Total (25-Hydroxy) 29 20 - 50 ng/mL    Narrative    Season, race, dietary  intake, and treatment affect the concentration of 25-hydroxy-Vitamin D. Values may decrease during winter months and increase during summer months.    Vitamin D determination is routinely performed by an immunoassay specific for 25 hydroxyvitamin D3.  If an individual is on vitamin D2(ergocalciferol) supplementation, please specify 25 OH vitamin D2 and D3 level determination by LCMSMS test [id number].     CBC with platelets and differential     Status: None   Result Value Ref Range    WBC Count 6.6 4.0 - 11.0 10e3/uL    RBC Count 5.26 4.40 - 5.90 10e6/uL    Hemoglobin 15.8 13.3 - 17.7 g/dL    Hematocrit 45.6 40.0 - 53.0 %    MCV 87 78 - 100 fL    MCH 30.0 26.5 - 33.0 pg    MCHC 34.6 31.5 - 36.5 g/dL    RDW 12.9 10.0 - 15.0 %    Platelet Count 242 150 - 450 10e3/uL    % Neutrophils 60 %    % Lymphocytes 29 %    % Monocytes 7 %    % Eosinophils 4 %    % Basophils 1 %    % Immature Granulocytes 0 %    Absolute Neutrophils 3.9 1.6 - 8.3 10e3/uL    Absolute Lymphocytes 1.9 0.8 - 5.3 10e3/uL    Absolute Monocytes 0.4 0.0 - 1.3 10e3/uL    Absolute Eosinophils 0.2 0.0 - 0.7 10e3/uL    Absolute Basophils 0.0 0.0 - 0.2 10e3/uL    Absolute Immature Granulocytes 0.0 <=0.4 10e3/uL   TSH with free T4 reflex     Status: Normal   Result Value Ref Range    TSH 0.97 0.30 - 4.20 uIU/mL   LDL cholesterol direct     Status: Normal   Result Value Ref Range    LDL Cholesterol Direct 82 <100 mg/dL   EKG 12-lead, tracing only     Status: None (Preliminary result)   Result Value Ref Range    Systolic Blood Pressure  mmHg    Diastolic Blood Pressure  mmHg    Ventricular Rate 94 BPM    Atrial Rate 94 BPM    FL Interval 154 ms    QRS Duration 80 ms     ms    QTc 455 ms    P Axis 40 degrees    R AXIS 15 degrees    T Axis 34 degrees    Interpretation ECG       Sinus rhythm  Normal ECG  When compared with ECG of 15-Zach-2023 15:05,  Nonspecific T wave abnormality no longer evident in Anterior leads     CBC with platelets and differential      Status: None    Narrative    The following orders were created for panel order CBC with platelets and differential.  Procedure                               Abnormality         Status                     ---------                               -----------         ------                     CBC with platelets and d...[[id number]]                      Final result                 Please view results for these tests on the individual orders.           Signed Electronically by: Hanny Vela MD

## 2024-12-19 ENCOUNTER — ORDERS ONLY (AUTO-RELEASED) (OUTPATIENT)
Dept: FAMILY MEDICINE | Facility: CLINIC | Age: 44
End: 2024-12-19
Payer: COMMERCIAL

## 2024-12-19 DIAGNOSIS — N18.30 TYPE 2 DIABETES MELLITUS WITH STAGE 3 CHRONIC KIDNEY DISEASE, WITHOUT LONG-TERM CURRENT USE OF INSULIN, UNSPECIFIED WHETHER STAGE 3A OR 3B CKD (H): ICD-10-CM

## 2024-12-19 DIAGNOSIS — E11.22 TYPE 2 DIABETES MELLITUS WITH STAGE 3 CHRONIC KIDNEY DISEASE, WITHOUT LONG-TERM CURRENT USE OF INSULIN, UNSPECIFIED WHETHER STAGE 3A OR 3B CKD (H): ICD-10-CM

## 2024-12-19 DIAGNOSIS — E78.5 HYPERLIPIDEMIA LDL GOAL <100: ICD-10-CM

## 2024-12-19 DIAGNOSIS — F43.12 CHRONIC POST-TRAUMATIC STRESS DISORDER (PTSD): ICD-10-CM

## 2024-12-19 DIAGNOSIS — R00.2 PALPITATIONS: ICD-10-CM

## 2024-12-20 ENCOUNTER — TELEPHONE (OUTPATIENT)
Dept: FAMILY MEDICINE | Facility: CLINIC | Age: 44
End: 2024-12-20
Payer: COMMERCIAL

## 2024-12-20 NOTE — TELEPHONE ENCOUNTER
----- Message from Hanny Dane sent at 12/19/2024  9:04 PM CST    Team - please call patient with results. Let him know that for now, given his results, he needs no med changed except that he does not have to take vitamin D supplementation. His cholesterol is near goal so he should continue his atorvastatin. The uric acid is normal, so continue allopurinol.     Spoke to patient on the phone to relay provider test result and recommendation above.  Patient verbalized understood.  No further action needed.    Jairo Yoo RN  [facility]

## 2024-12-24 DIAGNOSIS — E78.5 HYPERLIPIDEMIA LDL GOAL <100: ICD-10-CM

## 2024-12-24 RX ORDER — ATORVASTATIN CALCIUM 20 MG/1
20 TABLET, FILM COATED ORAL DAILY
Qty: 90 TABLET | Refills: 2 | Status: SHIPPED | OUTPATIENT
Start: 2024-12-24

## 2024-12-26 LAB
ATRIAL RATE - MUSE: 94 BPM
DIASTOLIC BLOOD PRESSURE - MUSE: NORMAL MMHG
INTERPRETATION ECG - MUSE: NORMAL
P AXIS - MUSE: 40 DEGREES
PR INTERVAL - MUSE: 154 MS
QRS DURATION - MUSE: 80 MS
QT - MUSE: 364 MS
QTC - MUSE: 455 MS
R AXIS - MUSE: 15 DEGREES
SYSTOLIC BLOOD PRESSURE - MUSE: NORMAL MMHG
T AXIS - MUSE: 34 DEGREES
VENTRICULAR RATE- MUSE: 94 BPM

## 2025-01-15 LAB — CV ZIO PRELIM RESULTS: NORMAL

## 2025-03-11 DIAGNOSIS — M1A.0720 CHRONIC GOUT OF LEFT ANKLE, UNSPECIFIED CAUSE: ICD-10-CM

## 2025-03-11 RX ORDER — ALLOPURINOL 300 MG/1
300 TABLET ORAL DAILY
Qty: 90 TABLET | Refills: 2 | Status: SHIPPED | OUTPATIENT
Start: 2025-03-11

## 2025-04-06 ENCOUNTER — HEALTH MAINTENANCE LETTER (OUTPATIENT)
Age: 45
End: 2025-04-06

## 2025-06-16 DIAGNOSIS — N18.30 TYPE 2 DIABETES MELLITUS WITH STAGE 3 CHRONIC KIDNEY DISEASE, WITHOUT LONG-TERM CURRENT USE OF INSULIN, UNSPECIFIED WHETHER STAGE 3A OR 3B CKD (H): Primary | ICD-10-CM

## 2025-06-16 DIAGNOSIS — E11.22 TYPE 2 DIABETES MELLITUS WITH STAGE 3 CHRONIC KIDNEY DISEASE, WITHOUT LONG-TERM CURRENT USE OF INSULIN, UNSPECIFIED WHETHER STAGE 3A OR 3B CKD (H): Primary | ICD-10-CM

## 2025-06-16 DIAGNOSIS — E78.5 HYPERLIPIDEMIA LDL GOAL <100: ICD-10-CM

## 2025-06-17 ENCOUNTER — MYC MEDICAL ADVICE (OUTPATIENT)
Dept: FAMILY MEDICINE | Facility: CLINIC | Age: 45
End: 2025-06-17

## 2025-06-17 ENCOUNTER — RESULTS FOLLOW-UP (OUTPATIENT)
Dept: FAMILY MEDICINE | Facility: CLINIC | Age: 45
End: 2025-06-17

## 2025-06-17 ENCOUNTER — OFFICE VISIT (OUTPATIENT)
Dept: FAMILY MEDICINE | Facility: CLINIC | Age: 45
End: 2025-06-17

## 2025-06-17 ENCOUNTER — TELEPHONE (OUTPATIENT)
Dept: FAMILY MEDICINE | Facility: CLINIC | Age: 45
End: 2025-06-17

## 2025-06-17 VITALS
SYSTOLIC BLOOD PRESSURE: 104 MMHG | RESPIRATION RATE: 12 BRPM | HEART RATE: 86 BPM | OXYGEN SATURATION: 99 % | BODY MASS INDEX: 28.77 KG/M2 | HEIGHT: 70 IN | DIASTOLIC BLOOD PRESSURE: 74 MMHG | WEIGHT: 201 LBS | TEMPERATURE: 98 F

## 2025-06-17 DIAGNOSIS — E11.9 TYPE 2 DIABETES MELLITUS WITHOUT COMPLICATION, WITHOUT LONG-TERM CURRENT USE OF INSULIN (H): ICD-10-CM

## 2025-06-17 DIAGNOSIS — M1A.0720 CHRONIC GOUT OF LEFT ANKLE, UNSPECIFIED CAUSE: ICD-10-CM

## 2025-06-17 DIAGNOSIS — Z71.6 ENCOUNTER FOR SMOKING CESSATION COUNSELING: ICD-10-CM

## 2025-06-17 DIAGNOSIS — M1A.9XX1 CHRONIC GOUT INVOLVING TOE WITH TOPHUS, UNSPECIFIED CAUSE, UNSPECIFIED LATERALITY: ICD-10-CM

## 2025-06-17 DIAGNOSIS — Z00.00 PREVENTATIVE HEALTH CARE: Primary | ICD-10-CM

## 2025-06-17 DIAGNOSIS — E11.9 TYPE 2 DIABETES MELLITUS WITHOUT COMPLICATION, WITHOUT LONG-TERM CURRENT USE OF INSULIN (H): Primary | ICD-10-CM

## 2025-06-17 DIAGNOSIS — E78.5 HYPERLIPIDEMIA LDL GOAL <100: ICD-10-CM

## 2025-06-17 DIAGNOSIS — F17.200 CURRENT SMOKER: ICD-10-CM

## 2025-06-17 LAB
ALBUMIN UR-MCNC: NEGATIVE MG/DL
APO A-I SERPL-MCNC: <6 MG/DL
APPEARANCE UR: CLEAR
BILIRUB UR QL STRIP: NEGATIVE
CHOLEST SERPL-MCNC: 260 MG/DL
COLOR UR AUTO: YELLOW
CREAT UR-MCNC: 150 MG/DL
EST. AVERAGE GLUCOSE BLD GHB EST-MCNC: 146 MG/DL
FASTING STATUS PATIENT QL REPORTED: YES
GLUCOSE UR STRIP-MCNC: NEGATIVE MG/DL
HBA1C MFR BLD: 6.7 % (ref 0–5.6)
HDLC SERPL-MCNC: 34 MG/DL
HGB UR QL STRIP: NEGATIVE
KETONES UR STRIP-MCNC: NEGATIVE MG/DL
LDLC SERPL CALC-MCNC: 171 MG/DL
LEUKOCYTE ESTERASE UR QL STRIP: NEGATIVE
MICROALBUMIN UR-MCNC: <12 MG/L
MICROALBUMIN/CREAT UR: NORMAL MG/G{CREAT}
NITRATE UR QL: NEGATIVE
NONHDLC SERPL-MCNC: 226 MG/DL
PH UR STRIP: 6 [PH] (ref 5–7)
SP GR UR STRIP: 1.02 (ref 1–1.03)
TRIGL SERPL-MCNC: 274 MG/DL
URATE SERPL-MCNC: 8.2 MG/DL (ref 3.4–7)
UROBILINOGEN UR STRIP-ACNC: 0.2 E.U./DL

## 2025-06-17 PROCEDURE — 82570 ASSAY OF URINE CREATININE: CPT | Performed by: FAMILY MEDICINE

## 2025-06-17 PROCEDURE — 83036 HEMOGLOBIN GLYCOSYLATED A1C: CPT | Performed by: FAMILY MEDICINE

## 2025-06-17 PROCEDURE — 84550 ASSAY OF BLOOD/URIC ACID: CPT | Performed by: FAMILY MEDICINE

## 2025-06-17 PROCEDURE — 83695 ASSAY OF LIPOPROTEIN(A): CPT | Performed by: FAMILY MEDICINE

## 2025-06-17 PROCEDURE — 99396 PREV VISIT EST AGE 40-64: CPT | Performed by: FAMILY MEDICINE

## 2025-06-17 PROCEDURE — 36415 COLL VENOUS BLD VENIPUNCTURE: CPT | Performed by: FAMILY MEDICINE

## 2025-06-17 PROCEDURE — 80061 LIPID PANEL: CPT | Performed by: FAMILY MEDICINE

## 2025-06-17 PROCEDURE — 82043 UR ALBUMIN QUANTITATIVE: CPT | Performed by: FAMILY MEDICINE

## 2025-06-17 PROCEDURE — 81003 URINALYSIS AUTO W/O SCOPE: CPT | Performed by: FAMILY MEDICINE

## 2025-06-17 PROCEDURE — 99213 OFFICE O/P EST LOW 20 MIN: CPT | Mod: 25 | Performed by: FAMILY MEDICINE

## 2025-06-17 RX ORDER — INDOMETHACIN 50 MG/1
50 CAPSULE ORAL 2 TIMES DAILY WITH MEALS
Qty: 50 CAPSULE | Refills: 4 | Status: SHIPPED | OUTPATIENT
Start: 2025-06-17

## 2025-06-17 RX ORDER — METFORMIN HYDROCHLORIDE 500 MG/1
500 TABLET, EXTENDED RELEASE ORAL 2 TIMES DAILY WITH MEALS
Qty: 180 TABLET | Refills: 3 | Status: SHIPPED | OUTPATIENT
Start: 2025-06-17

## 2025-06-17 RX ORDER — ALLOPURINOL 300 MG/1
300 TABLET ORAL DAILY
Qty: 90 TABLET | Refills: 2 | Status: SHIPPED | OUTPATIENT
Start: 2025-06-17

## 2025-06-17 RX ORDER — LISINOPRIL 2.5 MG/1
2.5 TABLET ORAL DAILY
Qty: 90 TABLET | Refills: 4 | Status: SHIPPED | OUTPATIENT
Start: 2025-06-17 | End: 2025-06-18 | Stop reason: SINTOL

## 2025-06-17 SDOH — HEALTH STABILITY: PHYSICAL HEALTH: ON AVERAGE, HOW MANY DAYS PER WEEK DO YOU ENGAGE IN MODERATE TO STRENUOUS EXERCISE (LIKE A BRISK WALK)?: 5 DAYS

## 2025-06-17 ASSESSMENT — SOCIAL DETERMINANTS OF HEALTH (SDOH): HOW OFTEN DO YOU GET TOGETHER WITH FRIENDS OR RELATIVES?: MORE THAN THREE TIMES A WEEK
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
## 2025-06-17 NOTE — TELEPHONE ENCOUNTER
Please call Thejanuary Senior.    1) ask him if he has figured out if he stopped his statin or his lisinopril? Record his answer and route to Dr. Vela    2) let him know his uric acid level is high, at 8.2. Because it is high, Dr. Vela recommends that he resume taking his allopurinol to keep it lower and thereby avoid more future gout attacks. If he agrees to take it, Dr. Vela will order it.    3) His cholesterol is quite high but the lipoprotein a is low. Dr. Vela still recommends he take a statin, if he can tolerate it. If he believes he did not tolerate it (due to cough) previously, we can try a different med.      Thanks

## 2025-06-18 RX ORDER — LOSARTAN POTASSIUM 25 MG/1
25 TABLET ORAL DAILY
Qty: 90 TABLET | Refills: 3 | Status: SHIPPED | OUTPATIENT
Start: 2025-06-18

## 2025-06-18 RX ORDER — ATORVASTATIN CALCIUM 20 MG/1
20 TABLET, FILM COATED ORAL DAILY
Qty: 90 TABLET | Refills: 2 | Status: SHIPPED | OUTPATIENT
Start: 2025-06-18

## 2025-06-18 RX ORDER — ALLOPURINOL 200 MG/1
200 TABLET ORAL DAILY
Qty: 90 TABLET | Refills: 3 | Status: SHIPPED | OUTPATIENT
Start: 2025-06-18

## 2025-06-18 NOTE — TELEPHONE ENCOUNTER
Writer attempt #1 to call patient regarding clinician's message below. No answer, left non-detailed voicemail, with clinic call back number.     If patient calls back, please transfer call to an available RN to relay clinician's message to them. Please obtain patient responses and route back to Dr. Vela as requested.Thanks!    KENTON JonasN, RN, PHN   [facility]

## 2025-06-18 NOTE — TELEPHONE ENCOUNTER
There is another TE regarding this.     Will close this MyChart message and continue on with other TE.      Geronimo Mercedes, BSN, RN, PHN   [facility]    
Writer responded to patient message via IfOnly.     Cortez Watson, MSN, RN   [facility]     
[Time Spent: ___ minutes] : I have spent [unfilled] minutes of time on the encounter.

## 2025-06-18 NOTE — TELEPHONE ENCOUNTER
Patient returned call. Test results provided with following response received from patient.    1) Stopped cholesterol (stopped for two months now) and Lisinopril (two months ago due to dry cough).    2,3)  Patient will resume taking both the Statin and Allopurinol medications as recommended from pcp.      Dr. Vela to be updated from encounter.    Call call back with detail message left on vm if needed and no answe.yuridia Yoo RN   [facility]

## 2025-07-08 DIAGNOSIS — F17.200 CURRENT SMOKER: ICD-10-CM

## (undated) DEVICE — GLOVE BIOGEL PI MICRO INDICATOR UNDERGLOVE SZ 8.0 48980

## (undated) DEVICE — LINEN FULL SHEET 5511

## (undated) DEVICE — BAG CLEAR TRASH 1.3M 39X33" P4040C

## (undated) DEVICE — LINEN DRAPE 54X72" 5467

## (undated) DEVICE — SOL NACL 0.9% IRRIG 3000ML BAG 2B7477

## (undated) DEVICE — LINEN HALF SHEET 5512

## (undated) DEVICE — DRAPE CONVERTORS U-DRAPE 60X72" 8476

## (undated) DEVICE — GLOVE BIOGEL PI SZ 8.5 40885

## (undated) DEVICE — BLADE KNIFE SURG 15 371115

## (undated) DEVICE — LINEN ORTHO ACL PACK 5447

## (undated) DEVICE — KIT DRAIN CLOSED WOUND SUCTION MED 400ML RESVR

## (undated) DEVICE — DRSG KERLIX 4 1/2"X4YDS ROLL 6730

## (undated) DEVICE — SU FIBERWIRE 2 38" T-8 NDL  AR-7206

## (undated) DEVICE — SU DERMABOND ADVANCED .7ML DNX12

## (undated) DEVICE — GLOVE BIOGEL PI SZ 7.5 40875

## (undated) DEVICE — IMM PILLOW ABDUCT HIP MED M60-025-M

## (undated) DEVICE — SU VICRYL 0 CT-1 27" J340H

## (undated) DEVICE — GOWN XXL 9575

## (undated) DEVICE — ESU GROUND PAD ADULT W/CORD E7507

## (undated) DEVICE — LINEN TOWEL PACK X5 5464

## (undated) DEVICE — SU STRATAFIX PDS PLUS 1 CT-1 12" SXPP1A443

## (undated) DEVICE — DRAPE X-RAY TUBE 00-901169-01-OEC

## (undated) DEVICE — SOL WATER IRRIG 1000ML BOTTLE 2F7114

## (undated) DEVICE — DRSG AQUACEL AG 3.5X14"  422607

## (undated) DEVICE — SU VICRYL 2-0 CT-1 27" UND J259H

## (undated) DEVICE — BLADE SAW RECIP STRK 70X12.5X0.80MM 0277-096-277

## (undated) DEVICE — SOL NACL 0.9% IRRIG 1000ML BOTTLE 2F7124

## (undated) DEVICE — SU MONOCRYL 3-0 PS-1 27" Y936H

## (undated) DEVICE — SU VICRYL 1 CTX 36" J371H

## (undated) DEVICE — DRSG XEROFORM 5X9" 8884431605

## (undated) DEVICE — SPONGE LAP 18X18" X8435

## (undated) DEVICE — PACK TOTAL HIP RIDGES LATEX PO15HIFSG

## (undated) DEVICE — SET HANDPIECE INTERPULSE W/COAXIAL FAN SPRAY TIP 0210118000

## (undated) DEVICE — PREP CHLORAPREP 26ML TINTED HI-LITE ORANGE 930815

## (undated) DEVICE — DRAIN HEMOVAC RESERVOIR KIT 10FR 1/8" MED 00-2550-002-10

## (undated) RX ORDER — LIDOCAINE HYDROCHLORIDE 5 MG/ML
INJECTION, SOLUTION INFILTRATION; INTRAVENOUS
Status: DISPENSED
Start: 2023-11-27

## (undated) RX ORDER — BUPIVACAINE HYDROCHLORIDE 2.5 MG/ML
INJECTION, SOLUTION EPIDURAL; INFILTRATION; INTRACAUDAL
Status: DISPENSED
Start: 2023-06-13

## (undated) RX ORDER — FENTANYL CITRATE-0.9 % NACL/PF 10 MCG/ML
PLASTIC BAG, INJECTION (ML) INTRAVENOUS
Status: DISPENSED
Start: 2023-06-13

## (undated) RX ORDER — CALCIUM CHLORIDE 100 MG/ML
INJECTION INTRAVENOUS; INTRAVENTRICULAR
Status: DISPENSED
Start: 2023-06-13

## (undated) RX ORDER — PROPOFOL 10 MG/ML
INJECTION, EMULSION INTRAVENOUS
Status: DISPENSED
Start: 2023-06-13

## (undated) RX ORDER — KETOROLAC TROMETHAMINE 30 MG/ML
INJECTION, SOLUTION INTRAMUSCULAR; INTRAVENOUS
Status: DISPENSED
Start: 2023-06-13

## (undated) RX ORDER — DEXAMETHASONE SODIUM PHOSPHATE 4 MG/ML
INJECTION, SOLUTION INTRA-ARTICULAR; INTRALESIONAL; INTRAMUSCULAR; INTRAVENOUS; SOFT TISSUE
Status: DISPENSED
Start: 2023-06-13

## (undated) RX ORDER — METOPROLOL TARTRATE 1 MG/ML
INJECTION, SOLUTION INTRAVENOUS
Status: DISPENSED
Start: 2023-06-13

## (undated) RX ORDER — CEFAZOLIN SODIUM/WATER 2 G/20 ML
SYRINGE (ML) INTRAVENOUS
Status: DISPENSED
Start: 2023-06-13

## (undated) RX ORDER — VASOPRESSIN 20 U/ML
INJECTION PARENTERAL
Status: DISPENSED
Start: 2023-06-13

## (undated) RX ORDER — TRANEXAMIC ACID 10 MG/ML
INJECTION, SOLUTION INTRAVENOUS
Status: DISPENSED
Start: 2023-06-13

## (undated) RX ORDER — NALOXONE HYDROCHLORIDE 0.4 MG/ML
INJECTION, SOLUTION INTRAMUSCULAR; INTRAVENOUS; SUBCUTANEOUS
Status: DISPENSED
Start: 2023-06-13

## (undated) RX ORDER — LIDOCAINE HYDROCHLORIDE 10 MG/ML
INJECTION, SOLUTION EPIDURAL; INFILTRATION; INTRACAUDAL; PERINEURAL
Status: DISPENSED
Start: 2023-06-13

## (undated) RX ORDER — ONDANSETRON 2 MG/ML
INJECTION INTRAMUSCULAR; INTRAVENOUS
Status: DISPENSED
Start: 2023-06-13

## (undated) RX ORDER — DEXMEDETOMIDINE HYDROCHLORIDE 4 UG/ML
INJECTION, SOLUTION INTRAVENOUS
Status: DISPENSED
Start: 2023-06-13

## (undated) RX ORDER — GLYCOPYRROLATE 0.2 MG/ML
INJECTION INTRAMUSCULAR; INTRAVENOUS
Status: DISPENSED
Start: 2023-06-13

## (undated) RX ORDER — LIDOCAINE HYDROCHLORIDE 10 MG/ML
INJECTION, SOLUTION EPIDURAL; INFILTRATION; INTRACAUDAL; PERINEURAL
Status: DISPENSED
Start: 2023-11-03

## (undated) RX ORDER — TRIAMCINOLONE ACETONIDE 40 MG/ML
INJECTION, SUSPENSION INTRA-ARTICULAR; INTRAMUSCULAR
Status: DISPENSED
Start: 2023-11-27

## (undated) RX ORDER — CEFAZOLIN SODIUM 1 G/3ML
INJECTION, POWDER, FOR SOLUTION INTRAMUSCULAR; INTRAVENOUS
Status: DISPENSED
Start: 2023-06-13

## (undated) RX ORDER — FENTANYL CITRATE 50 UG/ML
INJECTION, SOLUTION INTRAMUSCULAR; INTRAVENOUS
Status: DISPENSED
Start: 2023-06-13